# Patient Record
Sex: MALE | Race: WHITE | NOT HISPANIC OR LATINO | Employment: OTHER | URBAN - METROPOLITAN AREA
[De-identification: names, ages, dates, MRNs, and addresses within clinical notes are randomized per-mention and may not be internally consistent; named-entity substitution may affect disease eponyms.]

---

## 2019-05-14 PROBLEM — I49.5 SICK SINUS SYNDROME (HCC): Status: ACTIVE | Noted: 2019-05-14

## 2019-05-14 PROBLEM — I10 BENIGN ESSENTIAL HTN: Status: ACTIVE | Noted: 2019-05-14

## 2019-05-14 RX ORDER — SULINDAC 150 MG/1
150 TABLET ORAL DAILY
COMMUNITY
End: 2019-05-16 | Stop reason: DRUGHIGH

## 2019-05-14 RX ORDER — ASPIRIN 325 MG
325 TABLET ORAL DAILY
COMMUNITY
End: 2020-11-12 | Stop reason: DRUGHIGH

## 2019-05-14 RX ORDER — AMLODIPINE BESYLATE 10 MG/1
10 TABLET ORAL DAILY
COMMUNITY
End: 2019-05-16 | Stop reason: DRUGHIGH

## 2019-05-16 ENCOUNTER — OFFICE VISIT (OUTPATIENT)
Dept: CARDIOLOGY | Facility: CLINIC | Age: 79
End: 2019-05-16

## 2019-05-16 ENCOUNTER — HOSPITAL ENCOUNTER (OUTPATIENT)
Dept: CARDIOLOGY | Facility: HOSPITAL | Age: 79
Discharge: HOME | End: 2019-05-16
Admitting: INTERNAL MEDICINE

## 2019-05-16 VITALS
HEART RATE: 61 BPM | SYSTOLIC BLOOD PRESSURE: 148 MMHG | HEIGHT: 71 IN | DIASTOLIC BLOOD PRESSURE: 76 MMHG | WEIGHT: 227 LBS | BODY MASS INDEX: 31.78 KG/M2

## 2019-05-16 VITALS — WEIGHT: 227 LBS | DIASTOLIC BLOOD PRESSURE: 82 MMHG | HEART RATE: 61 BPM | SYSTOLIC BLOOD PRESSURE: 139 MMHG

## 2019-05-16 DIAGNOSIS — I48.0 PAROXYSMAL ATRIAL FIBRILLATION (HCC): Primary | ICD-10-CM

## 2019-05-16 PROCEDURE — 99214 OFFICE O/P EST MOD 30 MIN: CPT | Performed by: INTERNAL MEDICINE

## 2019-05-16 PROCEDURE — 93280 PM DEVICE PROGR EVAL DUAL: CPT | Performed by: INTERNAL MEDICINE

## 2019-05-16 PROCEDURE — 93306 TTE W/DOPPLER COMPLETE: CPT

## 2019-05-16 PROCEDURE — 93306 TTE W/DOPPLER COMPLETE: CPT | Performed by: INTERNAL MEDICINE

## 2019-05-16 RX ORDER — AMLODIPINE BESYLATE AND BENAZEPRIL HYDROCHLORIDE 10; 20 MG/1; MG/1
10-20 CAPSULE ORAL DAILY
COMMUNITY
End: 2020-11-12 | Stop reason: DRUGHIGH

## 2019-05-16 RX ORDER — MELOXICAM 15 MG/1
15 TABLET ORAL DAILY
COMMUNITY

## 2019-05-16 NOTE — PROGRESS NOTES
Subjective:     Encounter Date:05/16/2019      Patient ID: Ronni Mauricio is a 78 y.o. male.    Chief Complaint:  Chief Complaint   Patient presents with   • Atrial Fibrillation   • Shortness of Breath   • Dizziness   • Pacemaker Check         HPI:  Mr Mauricio is an 77 yo with a past medical history significant for HTN and AV block who had a pacemaker implanted about 10 years ago. He presents for routine followup.  He was found to have episodes of paroxysmal atrial fibrillation on remote monitoring.  Today he denies any symptoms of angina, palpitations, PND or orthopnea.  He does not some mild exertional dyspnea when climbing stairs and occassional dizziness which he attributes to inner ear disorder.    The following portions of the patient's history were reviewed and updated as appropriate: allergies, current medications, past family history, past medical history, past social history, past surgical history and problem list.    Problem List:  Patient Active Problem List   Diagnosis   • Benign essential HTN   • Sick sinus syndrome (CMS/HCC)       Past Medical History:  Past Medical History:   Diagnosis Date   • Atrial fibrillation (CMS/HCC)    • Benign essential HTN    • Pacemaker    • Sick sinus syndrome (CMS/HCC)        Past Surgical History:  Past Surgical History:   Procedure Laterality Date   • INSERT / REPLACE / REMOVE PACEMAKER      [facility]       Social History:  Social History     Socioeconomic History   • Marital status:      Spouse name: Not on file   • Number of children: Not on file   • Years of education: Not on file   • Highest education level: Not on file   Tobacco Use   • Smoking status: Never Smoker   • Smokeless tobacco: Former User     Types: Chew   Substance and Sexual Activity   • Alcohol use: Yes     Comment: occasional   • Drug use: No       Allergies:  Allergies   Allergen Reactions   • Codeine Itching   • Morphine Itching         ROS:  Review of Systems   Constitution: Positive for  weakness. Negative for decreased appetite.   HENT: Negative for congestion.    Cardiovascular: Positive for dyspnea on exertion. Negative for chest pain, claudication, cyanosis, irregular heartbeat, leg swelling, near-syncope, orthopnea, palpitations, paroxysmal nocturnal dyspnea and syncope.   Respiratory: Positive for shortness of breath.    Endocrine: Negative for heat intolerance.   Hematologic/Lymphatic: Bruises/bleeds easily.   Musculoskeletal: Positive for arthritis.   Gastrointestinal: Negative for abdominal pain.   Neurological: Positive for dizziness. Negative for disturbances in coordination.   Psychiatric/Behavioral: Negative for altered mental status.          Objective:         /82   Pulse 61   Wt 103 kg (227 lb)     Physical Exam   Constitutional: He is oriented to person, place, and time. He appears well-developed and well-nourished. No distress.   HENT:   Head: Normocephalic and atraumatic.   Eyes: Pupils are equal, round, and reactive to light.   Neck: Neck supple. No thyromegaly present.   Cardiovascular: Normal rate and regular rhythm. Exam reveals no gallop.   Murmur (2/6 FAWN LSB) heard.  Pulmonary/Chest: Effort normal. He has no wheezes. He has rales.   Abdominal: There is no tenderness.   Neurological: He is alert and oriented to person, place, and time.   Skin: Skin is warm and dry.   Psychiatric: He has a normal mood and affect.       In-Office Procedure(s):  Procedures    ASCVD RIsk Score::  The ASCVD Risk score ([place] FABRICE Jr., et al., 2013) failed to calculate for the following reasons:    The 2013 ASCVD risk score is only valid for ages 40 to 79    Recent Radiology:  Imaging Results (most recent)     None          Lab Review:   No results found for any previous visit.              Invalid input(s): ALKPO4                        Invalid input(s): LDLCALC                Assessment:    1. Paroxysmal Afib - recent onset, asymptomatic.  Discussed indication, risks and benefits of  anticoagulation with CHADS of 2 (HTN, age).  Will review insurance coverage.  2. HTN - controlled  3. Pacemaker followup - device interrogation reviewed by me,  normal device function        Plan:       Echo  Start anticoagulation  Enroll in remote monitoring  RTC 6 months  There are no diagnoses linked to this encounter.    Level of Care:                 Kory Corrales MD  05/16/19  .

## 2019-05-17 RX ORDER — DABIGATRAN ETEXILATE 150 MG/1
150 CAPSULE ORAL 2 TIMES DAILY
Qty: 180 CAPSULE | Refills: 4 | Status: SHIPPED | OUTPATIENT
Start: 2019-05-17 | End: 2019-11-21

## 2019-05-21 DIAGNOSIS — I48.0 AF (PAROXYSMAL ATRIAL FIBRILLATION) (HCC): Primary | ICD-10-CM

## 2019-05-23 ENCOUNTER — TELEPHONE (OUTPATIENT)
Dept: CARDIOLOGY | Facility: CLINIC | Age: 79
End: 2019-05-23

## 2019-05-23 LAB
AORTIC DIMENSIONLESS INDEX: 0.6 (DI)
BH CV ECHO MEAS - ACS: 1.6 CM
BH CV ECHO MEAS - AO MAX PG: 15 MMHG
BH CV ECHO MEAS - AO MEAN PG (FULL): 6 MMHG
BH CV ECHO MEAS - AO MEAN PG: 8 MMHG
BH CV ECHO MEAS - AO ROOT AREA (BSA CORRECTED): 1.7
BH CV ECHO MEAS - AO ROOT AREA: 10.8 CM^2
BH CV ECHO MEAS - AO ROOT DIAM: 3.7 CM
BH CV ECHO MEAS - AO V2 MAX: 193 CM/SEC
BH CV ECHO MEAS - AO V2 MEAN: 127 CM/SEC
BH CV ECHO MEAS - AO V2 VTI: 37.3 CM
BH CV ECHO MEAS - ASC AORTA: 3.3 CM
BH CV ECHO MEAS - AVA(I,A): 2.7 CM^2
BH CV ECHO MEAS - AVA(I,D): 2.7 CM^2
BH CV ECHO MEAS - BSA(HAYCOCK): 2.3 M^2
BH CV ECHO MEAS - BSA: 2.2 M^2
BH CV ECHO MEAS - BZI_BMI: 31.7 KILOGRAMS/M^2
BH CV ECHO MEAS - BZI_METRIC_HEIGHT: 180.3 CM
BH CV ECHO MEAS - BZI_METRIC_WEIGHT: 103 KG
BH CV ECHO MEAS - EDV(CUBED): 97.3 ML
BH CV ECHO MEAS - EDV(MOD-SP2): 94 ML
BH CV ECHO MEAS - EDV(MOD-SP4): 60 ML
BH CV ECHO MEAS - EDV(TEICH): 97.3 ML
BH CV ECHO MEAS - EF(CUBED): 56 %
BH CV ECHO MEAS - EF(MOD-BP): 47 %
BH CV ECHO MEAS - EF(MOD-SP2): 50 %
BH CV ECHO MEAS - EF(MOD-SP4): 45 %
BH CV ECHO MEAS - EF(TEICH): 47.7 %
BH CV ECHO MEAS - ESV(CUBED): 42.9 ML
BH CV ECHO MEAS - ESV(MOD-SP2): 47 ML
BH CV ECHO MEAS - ESV(MOD-SP4): 33 ML
BH CV ECHO MEAS - ESV(TEICH): 50.9 ML
BH CV ECHO MEAS - FS: 23.9 %
BH CV ECHO MEAS - IVS/LVPW: 1
BH CV ECHO MEAS - IVSD: 1.1 CM
BH CV ECHO MEAS - LAT PEAK E' VEL: 6 CM/SEC
BH CV ECHO MEAS - LV DIASTOLIC VOL/BSA (35-75): 27 ML/M^2
BH CV ECHO MEAS - LV MASS(C)D: 181.2 GRAMS
BH CV ECHO MEAS - LV MASS(C)DI: 81.4 GRAMS/M^2
BH CV ECHO MEAS - LV MEAN PG: 2 MMHG
BH CV ECHO MEAS - LV SYSTOLIC VOL/BSA (12-30): 14.8 ML/M^2
BH CV ECHO MEAS - LV V1 MAX: 98 CM/SEC
BH CV ECHO MEAS - LV V1 MEAN: 71.1 CM/SEC
BH CV ECHO MEAS - LV V1 VTI: 22.1 CM
BH CV ECHO MEAS - LVIDD: 4.6 CM
BH CV ECHO MEAS - LVIDS: 3.5 CM
BH CV ECHO MEAS - LVLD AP2: 7.4 CM
BH CV ECHO MEAS - LVLD AP4: 7.5 CM
BH CV ECHO MEAS - LVLS AP2: 6.5 CM
BH CV ECHO MEAS - LVLS AP4: 6.3 CM
BH CV ECHO MEAS - LVOT AREA (M): 4.5 CM^2
BH CV ECHO MEAS - LVOT AREA: 4.5 CM^2
BH CV ECHO MEAS - LVOT DIAM: 2.4 CM
BH CV ECHO MEAS - LVPWD: 1.1 CM
BH CV ECHO MEAS - MED PEAK E' VEL: 5 CM/SEC
BH CV ECHO MEAS - MV A DUR: 0.15 SEC
BH CV ECHO MEAS - MV A MAX VEL: 109 CM/SEC
BH CV ECHO MEAS - MV DEC SLOPE: 298 CM/SEC^2
BH CV ECHO MEAS - MV DEC TIME: 275 SEC
BH CV ECHO MEAS - MV E MAX VEL: 61.2 CM/SEC
BH CV ECHO MEAS - MV E/A: 0.56
BH CV ECHO MEAS - MV MEAN PG: 2 MMHG
BH CV ECHO MEAS - MV P1/2T MAX VEL: 89.6 CM/SEC
BH CV ECHO MEAS - MV P1/2T: 88.1 MSEC
BH CV ECHO MEAS - MV V2 MEAN: 64.9 CM/SEC
BH CV ECHO MEAS - MV V2 VTI: 36.2 CM
BH CV ECHO MEAS - MVA P1/2T LCG: 2.5 CM^2
BH CV ECHO MEAS - MVA(P1/2T): 2.5 CM^2
BH CV ECHO MEAS - MVA(VTI): 2.8 CM^2
BH CV ECHO MEAS - PA ACC SLOPE: 12 CM/SEC^2
BH CV ECHO MEAS - PA ACC TIME: 0.11 SEC
BH CV ECHO MEAS - PA MAX PG: 4.8 MMHG
BH CV ECHO MEAS - PA PR(ACCEL): 28.2 MMHG
BH CV ECHO MEAS - PA V2 MAX: 110 CM/SEC
BH CV ECHO MEAS - PULM A REVS DUR: 0.12 SEC
BH CV ECHO MEAS - PULM A REVS VEL: 32.8 CM/SEC
BH CV ECHO MEAS - PULM DIAS VEL: 37 CM/SEC
BH CV ECHO MEAS - PULM S/D: 1.5
BH CV ECHO MEAS - PULM SYS VEL: 55.1 CM/SEC
BH CV ECHO MEAS - QP/QS: 0.76
BH CV ECHO MEAS - RAP SYSTOLE: 8 MMHG
BH CV ECHO MEAS - RV MEAN PG: 1 MMHG
BH CV ECHO MEAS - RV V1 MEAN: 52.1 CM/SEC
BH CV ECHO MEAS - RV V1 VTI: 14.4 CM
BH CV ECHO MEAS - RVOT AREA: 5.3 CM^2
BH CV ECHO MEAS - RVOT DIAM: 2.6 CM
BH CV ECHO MEAS - RVSP: 33 MMHG
BH CV ECHO MEAS - SI(AO): 180.2 ML/M^2
BH CV ECHO MEAS - SI(CUBED): 24.5 ML/M^2
BH CV ECHO MEAS - SI(LVOT): 44.9 ML/M^2
BH CV ECHO MEAS - SI(MOD-SP2): 21.1 ML/M^2
BH CV ECHO MEAS - SI(MOD-SP4): 12.1 ML/M^2
BH CV ECHO MEAS - SI(TEICH): 20.9 ML/M^2
BH CV ECHO MEAS - SV(AO): 401.1 ML
BH CV ECHO MEAS - SV(CUBED): 54.5 ML
BH CV ECHO MEAS - SV(LVOT): 100 ML
BH CV ECHO MEAS - SV(MOD-SP2): 47 ML
BH CV ECHO MEAS - SV(MOD-SP4): 27 ML
BH CV ECHO MEAS - SV(RVOT): 76.5 ML
BH CV ECHO MEAS - SV(TEICH): 46.5 ML
BH CV ECHO MEAS - TAPSE (>1.6): 3.1 CM2
BH CV ECHO MEAS - TR MAX VEL: 248 CM/SEC
BH CV ECHO MEASUREMENTS AVERAGE E/E' RATIO: 11.13
BH CV VAS BP LEFT ARM: 1 MMHG
BH CV XLRA - RV BASE: 5.6 CM
BH CV XLRA - TDI S': 15.1 CM/SEC
LEFT ATRIUM VOLUME INDEX: 34.1 ML/M2
MAXIMAL PREDICTED HEART RATE: 140 BPM
STRESS TARGET HR: 119 BPM

## 2019-05-28 ENCOUNTER — TELEPHONE (OUTPATIENT)
Dept: CARDIOLOGY | Facility: CLINIC | Age: 79
End: 2019-05-28

## 2019-05-28 NOTE — TELEPHONE ENCOUNTER
PT just received Xarelto thru his mail order and he also takes an asprin. He needs to know if he needs to take the Xarelto.

## 2019-05-29 NOTE — TELEPHONE ENCOUNTER
Called patient advised that it doesn't say to stop aspirin In note that I would double check with him also that I would get results of echo and call patient back about results made his 6 mo follow up as well.

## 2019-11-14 ENCOUNTER — OFFICE VISIT (OUTPATIENT)
Dept: CARDIOLOGY | Facility: CLINIC | Age: 79
End: 2019-11-14

## 2019-11-14 VITALS
HEART RATE: 68 BPM | BODY MASS INDEX: 31.39 KG/M2 | DIASTOLIC BLOOD PRESSURE: 78 MMHG | HEIGHT: 71 IN | WEIGHT: 224.2 LBS | SYSTOLIC BLOOD PRESSURE: 123 MMHG

## 2019-11-14 DIAGNOSIS — I10 BENIGN ESSENTIAL HTN: ICD-10-CM

## 2019-11-14 DIAGNOSIS — I49.5 SICK SINUS SYNDROME (HCC): Primary | ICD-10-CM

## 2019-11-14 DIAGNOSIS — Z95.0 PRESENCE OF CARDIAC PACEMAKER: ICD-10-CM

## 2019-11-14 DIAGNOSIS — R01.1 HEART MURMUR: ICD-10-CM

## 2019-11-14 PROCEDURE — 93288 INTERROG EVL PM/LDLS PM IP: CPT | Performed by: INTERNAL MEDICINE

## 2019-11-14 PROCEDURE — 99213 OFFICE O/P EST LOW 20 MIN: CPT | Performed by: INTERNAL MEDICINE

## 2019-11-14 NOTE — PROGRESS NOTES
Subjective:     Encounter Date:11/14/2019      Patient ID: Ronni Mauricio is a 79 y.o. male.    Chief Complaint:  Presents for followup of pacemaker and AF    HPI:  Mr Mauricio is an 80 yo with a past medical history significant for HTN and AV block who had a pacemaker implanted in 2009.  He also was noted to have some paroxysmal atrial fibrillation on remote monitoring.    He had an echo 5/18 which showed an EF of 47%, moderately calcified AoV with mild AS, mild-mod MR    Today, he states that he has been doing well without complaints of palpitations, chest pain, dyspnea, PND or syncope.  He remains fairly active without limitations.      The following portions of the patient's history were reviewed and updated as appropriate: allergies, current medications, past family history, past medical history, past social history, past surgical history and problem list.    Problem List:  Patient Active Problem List   Diagnosis   • Benign essential HTN   • Sick sinus syndrome (CMS/HCC)   • Presence of cardiac pacemaker       Past Medical History:  Past Medical History:   Diagnosis Date   • Atrial fibrillation (CMS/HCC)    • Benign essential HTN    • Pacemaker    • Sick sinus syndrome (CMS/HCC)        Past Surgical History:  Past Surgical History:   Procedure Laterality Date   • INSERT / REPLACE / REMOVE PACEMAKER      [facility]       Social History:  Social History     Socioeconomic History   • Marital status:      Spouse name: Not on file   • Number of children: Not on file   • Years of education: Not on file   • Highest education level: Not on file   Tobacco Use   • Smoking status: Never Smoker   • Smokeless tobacco: Former User     Types: Chew   Substance and Sexual Activity   • Alcohol use: Yes     Comment: occasional   • Drug use: No       Allergies:  Allergies   Allergen Reactions   • Codeine Itching   • Morphine Itching       Immunizations:    There is no immunization history on file for this  patient.    ROS:  Review of Systems   Constitution: Negative for weakness and malaise/fatigue.   HENT: Negative.    Eyes: Negative.    Cardiovascular: Negative for chest pain, dyspnea on exertion, irregular heartbeat, palpitations and syncope.   Respiratory: Negative for shortness of breath.    Endocrine: Negative.    Hematologic/Lymphatic: Negative.    Skin: Negative.    Musculoskeletal: Negative.    Gastrointestinal: Negative.    Genitourinary: Negative.    Neurological: Negative.    Psychiatric/Behavioral: Negative.    Allergic/Immunologic: Negative.           Objective:         There were no vitals taken for this visit.    Physical Exam   Constitutional: He is oriented to person, place, and time. He appears well-developed and well-nourished.   HENT:   Head: Normocephalic and atraumatic.   Eyes: Conjunctivae are normal. Pupils are equal, round, and reactive to light.   Neck: Normal range of motion. Neck supple. No JVD present.   Cardiovascular: Normal rate and regular rhythm.   Murmur (2/6 FAWN) heard.  Pulmonary/Chest: Effort normal and breath sounds normal.   Abdominal: Soft. Bowel sounds are normal.   Musculoskeletal: Normal range of motion.   Neurological: He is alert and oriented to person, place, and time.   Skin: Skin is warm and dry.   Psychiatric: He has a normal mood and affect.       In-Office Procedure(s):  Procedures Pacemaker eval interpreted by [id number]  Battery 10 months to corrina    P wave 2.7mv  Threshold 0.5V  Impedance 360 ohms    R wave paced  Threshold 1.0V  Impedance 610 ohms    Events -  99% v paced    ASCVD RIsk Score::  The ASCVD Risk score ([place] FABRICE Jr., et al., 2013) failed to calculate for the following reasons:    The 2013 ASCVD risk score is only valid for ages 40 to 79    Recent Radiology:  Imaging Results (Most Recent)     None          Lab Review:   Office Visit on 05/16/2019   Component Date Value   • BSA 05/16/2019 2.2    • IVSd 05/16/2019 1.1    • LVIDd 05/16/2019 4.6    •  LVIDs 05/16/2019 3.5    • LVPWd 05/16/2019 1.1    • IVS/LVPW 05/16/2019 1.0    • FS 05/16/2019 23.9    • EDV(Teich) 05/16/2019 97.3    • ESV(Teich) 05/16/2019 50.9    • EF(Teich) 05/16/2019 47.7    • EDV(cubed) 05/16/2019 97.3    • ESV(cubed) 05/16/2019 42.9    • EF(cubed) 05/16/2019 56.0    • LV mass(C)d 05/16/2019 181.2    • LV mass(C)dI 05/16/2019 81.4    • SV(Teich) 05/16/2019 46.5    • SI(Teich) 05/16/2019 20.9    • SV(cubed) 05/16/2019 54.5    • SI(cubed) 05/16/2019 24.5    • Ao root diam 05/16/2019 3.7    • Ao root area 05/16/2019 10.8    • ACS 05/16/2019 1.6    • asc Aorta Diam 05/16/2019 3.3    • LVOT diam 05/16/2019 2.4    • LVOT area 05/16/2019 4.5    • LVOT area(traced) 05/16/2019 4.5    • RVOT diam 05/16/2019 2.6    • RVOT area 05/16/2019 5.3    • LVLd ap4 05/16/2019 7.5    • EDV(MOD-sp4) 05/16/2019 60.0    • LVLs ap4 05/16/2019 6.3    • ESV(MOD-sp4) 05/16/2019 33.0    • EF(MOD-sp4) 05/16/2019 45.0    • LVLd ap2 05/16/2019 7.4    • EDV(MOD-sp2) 05/16/2019 94.0    • LVLs ap2 05/16/2019 6.5    • ESV(MOD-sp2) 05/16/2019 47.0    • EF(MOD-sp2) 05/16/2019 50.0    • SV(MOD-sp4) 05/16/2019 27.0    • SI(MOD-sp4) 05/16/2019 12.1    • SV(MOD-sp2) 05/16/2019 47.0    • SI(MOD-sp2) 05/16/2019 21.1    • Ao root area (BSA correc* 05/16/2019 1.7    • LV Dupree Vol (BSA correct* 05/16/2019 27.0    • LV Sys Vol (BSA correcte* 05/16/2019 14.8    • MV A dur 05/16/2019 0.15    • MV E max richmond 05/16/2019 61.2    • MV A max richmond 05/16/2019 109.0    • MV E/A 05/16/2019 0.56    • MV V2 mean 05/16/2019 64.9    • MV mean PG 05/16/2019 2.0    • MV V2 VTI 05/16/2019 36.2    • MVA(VTI) 05/16/2019 2.8    • MV P1/2t max richmond 05/16/2019 89.6    • MV P1/2t 05/16/2019 88.1    • MVA(P1/2t) 05/16/2019 2.5    • MV dec slope 05/16/2019 298.0    • MV dec time 05/16/2019 275    • Ao V2 mean 05/16/2019 127.0    • Ao mean PG 05/16/2019 8.0    • Ao mean PG (full) 05/16/2019 6.0    • Ao V2 VTI 05/16/2019 37.3    • JÚNIOR(I,A) 05/16/2019 2.7    • JÚNIOR(I,D)  05/16/2019 2.7    • LV V1 mean PG 05/16/2019 2.0    • LV V1 mean 05/16/2019 71.1    • LV V1 VTI 05/16/2019 22.1    • SV(Ao) 05/16/2019 401.1    • SI(Ao) 05/16/2019 180.2    • SV(LVOT) 05/16/2019 100.0    • SV(RVOT) 05/16/2019 76.5    • SI(LVOT) 05/16/2019 44.9    • PA V2 max 05/16/2019 110.0    • PA max PG 05/16/2019 4.8    • PA acc slope 05/16/2019 12.0    • PA acc time 05/16/2019 0.11    • RV V1 mean PG 05/16/2019 1.0    • RV V1 mean 05/16/2019 52.1    • RV V1 VTI 05/16/2019 14.4    • TR max jude 05/16/2019 248.0    • PA pr(Accel) 05/16/2019 28.2    • Pulm Sys Jude 05/16/2019 55.1    • Pulm Dupree Jude 05/16/2019 37.0    • Pulm S/D 05/16/2019 1.5    • Qp/Qs 05/16/2019 0.76    • Pulm A Revs Dur 05/16/2019 0.12    • Pulm A Revs Jude 05/16/2019 32.8    • MVA P1/2T LCG 05/16/2019 2.5    • BH CV ECHO EBONY - BZI_BMI 05/16/2019 31.7    • BH CV ECHO EBONY - BSA(HA* 05/16/2019 2.3    • BH CV ECHO EBONY - BZI_ME* 05/16/2019 103.0    • BH CV ECHO EBONY - BZI_ME* 05/16/2019 180.3    • Target HR (85%) 05/16/2019 119    • Max. Pred. HR (100%) 05/16/2019 140    • BH CV VAS BP LEFT ARM 05/16/2019 1    • TDI S' 05/16/2019 15.10    • RV Base 05/16/2019 5.60    • Dimensionless Index 05/16/2019 0.6    • LA Volume Index 05/16/2019 34.1    • Avg E/e' ratio 05/16/2019 11.13    • Ao pk jude 05/16/2019 193.0    • EF(MOD-bp) 05/16/2019 47.0    • Lat Peak E' Jude 05/16/2019 6.0    • LV V1 max 05/16/2019 98.0    • Med Peak E' Jude 05/16/2019 5.00    • RAP systole 05/16/2019 8    • RVSP(TR) 05/16/2019 33    • Ao max PG 05/16/2019 15.0    • TAPSE (>1.6) 05/16/2019 3.10                 Assessment:          Diagnosis Plan   1. Sick sinus syndrome (CMS/HCC)     2. Presence of cardiac pacemaker     3. Benign essential HTN            Plan:     1. AV block - s/p pacemaker  2. Pacemaker followup - normal device function, approaching corrina  3. HTN - controlled  4. Valvular Heart disease - mild AS, mild-moderate MR, asymptomatic    RTC 6 months         Level of  Care:                 Kory Corrales MD  11/14/19  .

## 2019-11-21 ENCOUNTER — TELEPHONE (OUTPATIENT)
Dept: CARDIOLOGY | Facility: CLINIC | Age: 79
End: 2019-11-21

## 2019-11-21 NOTE — TELEPHONE ENCOUNTER
Pt called with questions which were answered for pt.  He reviewed his medication list & found Pradaxa listed which he is not currently taking. Xarelto is an active medication.  Med list updated. RTRN

## 2019-11-26 ENCOUNTER — HOSPITAL ENCOUNTER (OUTPATIENT)
Dept: CARDIOLOGY | Facility: HOSPITAL | Age: 79
Discharge: HOME | End: 2019-11-26
Admitting: INTERNAL MEDICINE

## 2019-11-26 VITALS
SYSTOLIC BLOOD PRESSURE: 151 MMHG | HEIGHT: 71 IN | BODY MASS INDEX: 31.36 KG/M2 | DIASTOLIC BLOOD PRESSURE: 67 MMHG | WEIGHT: 224 LBS

## 2019-11-26 DIAGNOSIS — I10 BENIGN ESSENTIAL HTN: ICD-10-CM

## 2019-11-26 DIAGNOSIS — R01.1 HEART MURMUR: ICD-10-CM

## 2019-11-26 PROCEDURE — 93306 TTE W/DOPPLER COMPLETE: CPT

## 2019-11-26 PROCEDURE — 93306 TTE W/DOPPLER COMPLETE: CPT | Performed by: INTERNAL MEDICINE

## 2019-11-27 LAB
AORTIC DIMENSIONLESS INDEX: 0.4 (DI)
BH CV ECHO MEAS - ACS: 1.6 CM
BH CV ECHO MEAS - AO MAX PG: 19 MMHG
BH CV ECHO MEAS - AO MEAN PG (FULL): 10 MMHG
BH CV ECHO MEAS - AO MEAN PG: 12 MMHG
BH CV ECHO MEAS - AO ROOT AREA (BSA CORRECTED): 1.3
BH CV ECHO MEAS - AO ROOT AREA: 6.2 CM^2
BH CV ECHO MEAS - AO ROOT DIAM: 2.8 CM
BH CV ECHO MEAS - AO V2 MAX: 219 CM/SEC
BH CV ECHO MEAS - AO V2 MEAN: 162 CM/SEC
BH CV ECHO MEAS - AO V2 VTI: 52 CM
BH CV ECHO MEAS - AVA(I,A): 1.4 CM^2
BH CV ECHO MEAS - AVA(I,D): 1.4 CM^2
BH CV ECHO MEAS - BSA(HAYCOCK): 2.3 M^2
BH CV ECHO MEAS - BSA: 2.2 M^2
BH CV ECHO MEAS - BZI_BMI: 31.2 KILOGRAMS/M^2
BH CV ECHO MEAS - BZI_METRIC_HEIGHT: 180.3 CM
BH CV ECHO MEAS - BZI_METRIC_WEIGHT: 101.6 KG
BH CV ECHO MEAS - EDV(CUBED): 85.2 ML
BH CV ECHO MEAS - EDV(MOD-SP2): 120 ML
BH CV ECHO MEAS - EDV(MOD-SP4): 120 ML
BH CV ECHO MEAS - EDV(TEICH): 87.7 ML
BH CV ECHO MEAS - EF(CUBED): 65 %
BH CV ECHO MEAS - EF(MOD-SP2): 50 %
BH CV ECHO MEAS - EF(MOD-SP4): 52.5 %
BH CV ECHO MEAS - EF(TEICH): 56.8 %
BH CV ECHO MEAS - ESV(CUBED): 29.8 ML
BH CV ECHO MEAS - ESV(MOD-SP2): 60 ML
BH CV ECHO MEAS - ESV(MOD-SP4): 57 ML
BH CV ECHO MEAS - ESV(TEICH): 37.9 ML
BH CV ECHO MEAS - FS: 29.5 %
BH CV ECHO MEAS - IVS/LVPW: 0.83
BH CV ECHO MEAS - IVSD: 1 CM
BH CV ECHO MEAS - LAT PEAK E' VEL: 6 CM/SEC
BH CV ECHO MEAS - LV DIASTOLIC VOL/BSA (35-75): 54.2 ML/M^2
BH CV ECHO MEAS - LV MASS(C)D: 168.9 GRAMS
BH CV ECHO MEAS - LV MASS(C)DI: 76.3 GRAMS/M^2
BH CV ECHO MEAS - LV MAX PG: 4 MMHG
BH CV ECHO MEAS - LV MEAN PG: 2 MMHG
BH CV ECHO MEAS - LV SYSTOLIC VOL/BSA (12-30): 25.8 ML/M^2
BH CV ECHO MEAS - LV V1 MAX: 96 CM/SEC
BH CV ECHO MEAS - LV V1 MEAN: 68.1 CM/SEC
BH CV ECHO MEAS - LV V1 VTI: 21.3 CM
BH CV ECHO MEAS - LVIDD: 4.4 CM
BH CV ECHO MEAS - LVIDS: 3.1 CM
BH CV ECHO MEAS - LVLD AP2: 8.9 CM
BH CV ECHO MEAS - LVLD AP4: 8.6 CM
BH CV ECHO MEAS - LVLS AP2: 7 CM
BH CV ECHO MEAS - LVLS AP4: 6.8 CM
BH CV ECHO MEAS - LVOT AREA (M): 3.5 CM^2
BH CV ECHO MEAS - LVOT AREA: 3.5 CM^2
BH CV ECHO MEAS - LVOT DIAM: 2.1 CM
BH CV ECHO MEAS - LVPWD: 1.2 CM
BH CV ECHO MEAS - MED PEAK E' VEL: 7 CM/SEC
BH CV ECHO MEAS - MV A DUR: 0.15 SEC
BH CV ECHO MEAS - MV A MAX VEL: 79 CM/SEC
BH CV ECHO MEAS - MV DEC SLOPE: 236 CM/SEC^2
BH CV ECHO MEAS - MV DEC TIME: 430 SEC
BH CV ECHO MEAS - MV E MAX VEL: 41.5 CM/SEC
BH CV ECHO MEAS - MV E/A: 0.53
BH CV ECHO MEAS - MV MEAN PG: 1 MMHG
BH CV ECHO MEAS - MV P1/2T MAX VEL: 64.5 CM/SEC
BH CV ECHO MEAS - MV P1/2T: 80 MSEC
BH CV ECHO MEAS - MV V2 MEAN: 46.1 CM/SEC
BH CV ECHO MEAS - MV V2 VTI: 23.3 CM
BH CV ECHO MEAS - MVA P1/2T LCG: 3.4 CM^2
BH CV ECHO MEAS - MVA(P1/2T): 2.7 CM^2
BH CV ECHO MEAS - MVA(VTI): 3.2 CM^2
BH CV ECHO MEAS - PA ACC SLOPE: 1894 CM/SEC^2
BH CV ECHO MEAS - PA ACC TIME: 0.07 SEC
BH CV ECHO MEAS - PA MAX PG (FULL): 6 MMHG
BH CV ECHO MEAS - PA MAX PG: 7.7 MMHG
BH CV ECHO MEAS - PA PR(ACCEL): 45.7 MMHG
BH CV ECHO MEAS - PA V2 MAX: 139 CM/SEC
BH CV ECHO MEAS - PULM A REVS DUR: 0.14 SEC
BH CV ECHO MEAS - PULM A REVS VEL: 39 CM/SEC
BH CV ECHO MEAS - PULM DIAS VEL: 26 CM/SEC
BH CV ECHO MEAS - PULM S/D: 1.6
BH CV ECHO MEAS - PULM SYS VEL: 40.3 CM/SEC
BH CV ECHO MEAS - PVA(V,A): 2 CM^2
BH CV ECHO MEAS - PVA(V,D): 2 CM^2
BH CV ECHO MEAS - QP/QS: 0.72
BH CV ECHO MEAS - RAP SYSTOLE: 3 MMHG
BH CV ECHO MEAS - RV MAX PG: 1.7 MMHG
BH CV ECHO MEAS - RV MEAN PG: 1 MMHG
BH CV ECHO MEAS - RV V1 MAX: 65.4 CM/SEC
BH CV ECHO MEAS - RV V1 MEAN: 39 CM/SEC
BH CV ECHO MEAS - RV V1 VTI: 12.8 CM
BH CV ECHO MEAS - RVOT AREA: 4.2 CM^2
BH CV ECHO MEAS - RVOT DIAM: 2.3 CM
BH CV ECHO MEAS - SI(AO): 144.7 ML/M^2
BH CV ECHO MEAS - SI(CUBED): 25 ML/M^2
BH CV ECHO MEAS - SI(LVOT): 33.3 ML/M^2
BH CV ECHO MEAS - SI(MOD-SP2): 27.1 ML/M^2
BH CV ECHO MEAS - SI(MOD-SP4): 28.5 ML/M^2
BH CV ECHO MEAS - SI(TEICH): 22.5 ML/M^2
BH CV ECHO MEAS - SV(AO): 320.2 ML
BH CV ECHO MEAS - SV(CUBED): 55.4 ML
BH CV ECHO MEAS - SV(LVOT): 73.8 ML
BH CV ECHO MEAS - SV(MOD-SP2): 60 ML
BH CV ECHO MEAS - SV(MOD-SP4): 63 ML
BH CV ECHO MEAS - SV(RVOT): 53.2 ML
BH CV ECHO MEAS - SV(TEICH): 49.8 ML
BH CV ECHO MEAS - TAPSE (>1.6): 1.8 CM2
BH CV ECHO MEAS - TR MAX VEL: 166 CM/SEC
BH CV ECHO MEASUREMENTS AVERAGE E/E' RATIO: 6.38
BH CV VAS BP LEFT ARM: NORMAL MMHG
BH CV XLRA - RV BASE: 3.8 CM
BH CV XLRA - TDI S': 13 CM/SEC
LEFT ATRIUM VOLUME INDEX: 21 ML/M2

## 2019-12-02 ENCOUNTER — TELEPHONE (OUTPATIENT)
Dept: CARDIOLOGY | Facility: CLINIC | Age: 79
End: 2019-12-02

## 2019-12-03 NOTE — TELEPHONE ENCOUNTER
Spoke with patient and let him know his results were in his box and that I would have him call him with results on Thursday  Patient understood

## 2019-12-06 NOTE — TELEPHONE ENCOUNTER
Mr Mauricio is requesting a return call. Dr Corrales has not called with his test results. Just wanting to get those results. Thank you    [phone]

## 2019-12-06 NOTE — TELEPHONE ENCOUNTER
His EF near norm  Aortic valve is calcified   Mod narrowed   Mitral valve leaks a little   Nothing to be done for now   Murmur comes from this follow up with echos

## 2020-01-22 DIAGNOSIS — I48.0 AF (PAROXYSMAL ATRIAL FIBRILLATION) (HCC): ICD-10-CM

## 2020-03-16 ENCOUNTER — CLINICAL SUPPORT (OUTPATIENT)
Dept: CARDIOLOGY | Facility: CLINIC | Age: 80
End: 2020-03-16

## 2020-03-16 DIAGNOSIS — Z95.0 PRESENCE OF CARDIAC PACEMAKER: ICD-10-CM

## 2020-03-16 DIAGNOSIS — I49.5 SICK SINUS SYNDROME (HCC): ICD-10-CM

## 2020-03-16 PROCEDURE — 93294 REM INTERROG EVL PM/LDLS PM: CPT | Performed by: INTERNAL MEDICINE

## 2020-03-16 PROCEDURE — 93296 REM INTERROG EVL PM/IDS: CPT | Performed by: INTERNAL MEDICINE

## 2020-05-18 ENCOUNTER — OFFICE VISIT (OUTPATIENT)
Dept: CARDIOLOGY | Facility: CLINIC | Age: 80
End: 2020-05-18

## 2020-05-18 VITALS
RESPIRATION RATE: 16 BRPM | WEIGHT: 221 LBS | HEIGHT: 71 IN | SYSTOLIC BLOOD PRESSURE: 132 MMHG | DIASTOLIC BLOOD PRESSURE: 60 MMHG | HEART RATE: 64 BPM | BODY MASS INDEX: 30.94 KG/M2

## 2020-05-18 DIAGNOSIS — Z95.0 PRESENCE OF CARDIAC PACEMAKER: ICD-10-CM

## 2020-05-18 DIAGNOSIS — I35.0 AORTIC STENOSIS, MODERATE: ICD-10-CM

## 2020-05-18 DIAGNOSIS — R06.09 DYSPNEA ON EXERTION: Primary | ICD-10-CM

## 2020-05-18 DIAGNOSIS — I10 BENIGN ESSENTIAL HTN: ICD-10-CM

## 2020-05-18 DIAGNOSIS — I49.5 SICK SINUS SYNDROME (HCC): ICD-10-CM

## 2020-05-18 DIAGNOSIS — I51.89 DIASTOLIC DYSFUNCTION: ICD-10-CM

## 2020-05-18 PROCEDURE — 99213 OFFICE O/P EST LOW 20 MIN: CPT | Performed by: INTERNAL MEDICINE

## 2020-05-18 PROCEDURE — 93288 INTERROG EVL PM/LDLS PM IP: CPT | Performed by: INTERNAL MEDICINE

## 2020-05-19 ENCOUNTER — TRANSCRIBE ORDERS (OUTPATIENT)
Dept: SLEEP MEDICINE | Facility: HOSPITAL | Age: 80
End: 2020-05-19

## 2020-05-19 DIAGNOSIS — Z01.818 OTHER SPECIFIED PRE-OPERATIVE EXAMINATION: Primary | ICD-10-CM

## 2020-05-19 PROBLEM — I35.0 AORTIC STENOSIS, MODERATE: Status: ACTIVE | Noted: 2020-05-19

## 2020-05-19 PROBLEM — I51.89 DIASTOLIC DYSFUNCTION: Status: ACTIVE | Noted: 2020-05-19

## 2020-05-19 NOTE — PROGRESS NOTES
Subjective:     Encounter Date:05/18/2020      Patient ID: Ronni Mauricio is a 79 y.o. male.    Chief Complaint:  Followup pacemaker and atrial fibrillation    HPI:  Patient presensts for  followup of his pacemaker and atrial fibrillation.  Mr Mauricio is an 80 yo with a past medical history significant for HTN and AV block who had a pacemaker implanted in 2009.  He also was noted to have some paroxysmal atrial fibrillation on remote monitoring.     He had an echo 11/19 which showed an EF of 45-50%, concentric LVH with grade 1 diastolic dysfunction, normal RV size and function, moderate AS without AR, MAC wtihout MS/MR.     Since he was last seen 6 months ago Mr Mauricio now complains of having exertional dyspnea.  He notices dyspnea with climbing a flight of stairs or carrying objects.  There is no associated chest discomfort or palpitations.  He denies any rest symptoms, PND, orthopnea or peripheral edema.      The following portions of the patient's history were reviewed and updated as appropriate: allergies, current medications, past family history, past medical history, past social history, past surgical history and problem list.    Problem List:  Patient Active Problem List   Diagnosis   • Benign essential HTN   • Sick sinus syndrome (CMS/HCC)   • Presence of cardiac pacemaker   • Heart murmur       Past Medical History:  Past Medical History:   Diagnosis Date   • Atrial fibrillation (CMS/HCC)    • Benign essential HTN    • Pacemaker    • Sick sinus syndrome (CMS/HCC)        Past Surgical History:  Past Surgical History:   Procedure Laterality Date   • INSERT / REPLACE / REMOVE PACEMAKER      [facility]       Social History:  Social History     Socioeconomic History   • Marital status:      Spouse name: Not on file   • Number of children: Not on file   • Years of education: Not on file   • Highest education level: Not on file   Tobacco Use   • Smoking status: Never Smoker   • Smokeless tobacco: Former  "User     Types: Chew   Substance and Sexual Activity   • Alcohol use: Yes     Comment: occasional   • Drug use: No       Allergies:  Allergies   Allergen Reactions   • Codeine Itching   • Morphine Itching       Immunizations:    There is no immunization history on file for this patient.    ROS:  Review of Systems   Constitution: Positive for malaise/fatigue.   Cardiovascular: Positive for dyspnea on exertion. Negative for chest pain, irregular heartbeat, leg swelling, near-syncope, orthopnea, palpitations, paroxysmal nocturnal dyspnea and syncope.   Respiratory: Positive for shortness of breath.    All other systems reviewed and are negative.         Objective:         /60   Pulse 64   Resp 16   Ht 180.3 cm (71\")   Wt 100 kg (221 lb)   BMI 30.82 kg/m²     Physical Exam   Constitutional: He is oriented to person, place, and time. He appears well-developed and well-nourished. No distress.   HENT:   Head: Normocephalic and atraumatic.   Eyes: Pupils are equal, round, and reactive to light. Conjunctivae and EOM are normal. No scleral icterus.   Neck: Normal range of motion. No thyromegaly present.   Cardiovascular: Normal rate and regular rhythm.   Murmur: 1-2/6 FAWN.  Pulmonary/Chest: Effort normal and breath sounds normal.   Abdominal: Soft. Bowel sounds are normal.   Musculoskeletal: Normal range of motion.   Neurological: He is alert and oriented to person, place, and time.   Skin: Skin is warm and dry.   Psychiatric: He has a normal mood and affect.       In-Office Procedure(s):  Procedures Pacemaker eval interpreted by [facility]  Battery DOMINIQUE    P wave 1mv  Threshold 0.5V  Impedance 380 ohms    R wave paced  Threshold 1.0V  Impedance 650 ohms    Events - none    ASCVD RIsk Score::  The ASCVD Risk score ([place] FABRICE Jr., et al., 2013) failed to calculate for the following reasons:    The 2013 ASCVD risk score is only valid for ages 40 to 79    Recent Radiology:  Imaging Results (Most Recent)     None    "       Lab Review:   No visits with results within 2 Month(s) from this visit.   Latest known visit with results is:   Hospital Outpatient Visit on 11/26/2019   Component Date Value   • BSA 11/26/2019 2.2    • IVSd 11/26/2019 1.0    • LVIDd 11/26/2019 4.4    • LVIDs 11/26/2019 3.1    • LVPWd 11/26/2019 1.2    • IVS/LVPW 11/26/2019 0.83    • FS 11/26/2019 29.5    • EDV(Teich) 11/26/2019 87.7    • ESV(Teich) 11/26/2019 37.9    • EF(Teich) 11/26/2019 56.8    • EDV(cubed) 11/26/2019 85.2    • ESV(cubed) 11/26/2019 29.8    • EF(cubed) 11/26/2019 65.0    • LV mass(C)d 11/26/2019 168.9    • LV mass(C)dI 11/26/2019 76.3    • SV(Teich) 11/26/2019 49.8    • SI(Teich) 11/26/2019 22.5    • SV(cubed) 11/26/2019 55.4    • SI(cubed) 11/26/2019 25.0    • Ao root diam 11/26/2019 2.8    • Ao root area 11/26/2019 6.2    • ACS 11/26/2019 1.6    • LVOT diam 11/26/2019 2.1    • LVOT area 11/26/2019 3.5    • LVOT area(traced) 11/26/2019 3.5    • RVOT diam 11/26/2019 2.3    • RVOT area 11/26/2019 4.2    • LVLd ap4 11/26/2019 8.6    • EDV(MOD-sp4) 11/26/2019 120.0    • LVLs ap4 11/26/2019 6.8    • ESV(MOD-sp4) 11/26/2019 57.0    • EF(MOD-sp4) 11/26/2019 52.5    • LVLd ap2 11/26/2019 8.9    • EDV(MOD-sp2) 11/26/2019 120.0    • LVLs ap2 11/26/2019 7.0    • ESV(MOD-sp2) 11/26/2019 60.0    • EF(MOD-sp2) 11/26/2019 50.0    • SV(MOD-sp4) 11/26/2019 63.0    • SI(MOD-sp4) 11/26/2019 28.5    • SV(MOD-sp2) 11/26/2019 60.0    • SI(MOD-sp2) 11/26/2019 27.1    • Ao root area (BSA correc* 11/26/2019 1.3    • LV Dupree Vol (BSA correct* 11/26/2019 54.2    • LV Sys Vol (BSA correcte* 11/26/2019 25.8    • MV A dur 11/26/2019 0.15    • MV E max richmond 11/26/2019 41.5    • MV A max richmond 11/26/2019 79.0    • MV E/A 11/26/2019 0.53    • MV V2 mean 11/26/2019 46.1    • MV mean PG 11/26/2019 1.0    • MV V2 VTI 11/26/2019 23.3    • MVA(VTI) 11/26/2019 3.2    • MV P1/2t max [place] 11/26/2019 64.5    • MV P1/2t 11/26/2019 80.0    • MVA(P1/2t) 11/26/2019 2.7    • MV dec slope  11/26/2019 236.0    • MV dec time 11/26/2019 430    • Ao V2 mean 11/26/2019 162.0    • Ao mean PG 11/26/2019 12.0    • Ao mean PG (full) 11/26/2019 10.0    • Ao V2 VTI 11/26/2019 52.0    • JÚNIOR(I,A) 11/26/2019 1.4    • JÚNIOR(I,D) 11/26/2019 1.4    • LV V1 mean PG 11/26/2019 2.0    • LV V1 mean 11/26/2019 68.1    • LV V1 VTI 11/26/2019 21.3    • SV(Ao) 11/26/2019 320.2    • SI(Ao) 11/26/2019 144.7    • SV(LVOT) 11/26/2019 73.8    • SV(RVOT) 11/26/2019 53.2    • SI(LVOT) 11/26/2019 33.3    • PA V2 max 11/26/2019 139.0    • PA max PG 11/26/2019 7.7    • PA max PG (full) 11/26/2019 6.0    • BH CV ECHO EBONY - PVA(V,* 11/26/2019 2.0    • BH CV ECHO EBONY - PVA(V,* 11/26/2019 2.0    • PA acc slope 11/26/2019 1,894    • PA acc time 11/26/2019 0.07    • RV V1 max PG 11/26/2019 1.7    • RV V1 mean PG 11/26/2019 1.0    • RV V1 max 11/26/2019 65.4    • RV V1 mean 11/26/2019 39.0    • RV V1 VTI 11/26/2019 12.8    • TR max jude 11/26/2019 166.0    • PA pr(Accel) 11/26/2019 45.7    • Pulm Sys Jude 11/26/2019 40.3    • Pulm Dupree Jude 11/26/2019 26.0    • Pulm S/D 11/26/2019 1.6    • Qp/Qs 11/26/2019 0.72    • Pulm A Revs Dur 11/26/2019 0.14    • Pulm A Revs Jude 11/26/2019 39.0    • MVA P1/2T LCG 11/26/2019 3.4    • BH CV ECHO EBONY - BZI_BMI 11/26/2019 31.2    •  CV ECHO EBONY - BSA(HA* 11/26/2019 2.3    •  CV ECHO EBONY - BZI_ME* 11/26/2019 101.6    •  CV ECHO EBONY - BZI_ME* 11/26/2019 180.3    •  CV VAS BP LEFT ARM 11/26/2019 151/67    • TDI S' 11/26/2019 13.00    • RV Base 11/26/2019 3.80    • Dimensionless Index 11/26/2019 0.4    • LA Volume Index 11/26/2019 21.0    • Avg E/e' ratio 11/26/2019 6.38    • Ao pk jude 11/26/2019 219.0    • Lat Peak E' Jude 11/26/2019 6.0    • LV V1 max PG 11/26/2019 4.0    • LV V1 max 11/26/2019 96.0    • Med Peak E' Jude 11/26/2019 7.00    • RAP systole 11/26/2019 3    • Ao max PG 11/26/2019 19.0    • TAPSE (>1.6) 11/26/2019 1.80                 Assessment:          Diagnosis Plan   1. Dyspnea on  exertion  Adult Transthoracic Echo Complete W/ Cont if Necessary Per Protocol    Stress Test With Myocardial Perfusion   2. Presence of cardiac pacemaker  Case Request Cath Lab: PPM battery change. [facility] change out    CBC & Differential    Comprehensive Metabolic Panel    Protime-INR   3. Sick sinus syndrome (CMS/HCC)  Case Request Cath Lab: PPM battery change. [facility] change out    CBC & Differential    Comprehensive Metabolic Panel    Protime-INR   4. Benign essential HTN  Adult Transthoracic Echo Complete W/ Cont if Necessary Per Protocol    Stress Test With Myocardial Perfusion          Plan:      1. HTN - controlled  2. Aortic stenosis, moderate - having increased dyspnea, will repeat echo and get Lexiscan  3. Pacemaker followup - normal device function but at corrina, will schedule for replacement this week    RTC 1 month      Level of Care:                 Kory Corrales MD  05/19/20  .

## 2020-05-20 ENCOUNTER — LAB (OUTPATIENT)
Dept: LAB | Facility: HOSPITAL | Age: 80
End: 2020-05-20

## 2020-05-20 DIAGNOSIS — Z95.0 PRESENCE OF CARDIAC PACEMAKER: ICD-10-CM

## 2020-05-20 DIAGNOSIS — I49.5 SICK SINUS SYNDROME (HCC): ICD-10-CM

## 2020-05-20 DIAGNOSIS — Z01.818 OTHER SPECIFIED PRE-OPERATIVE EXAMINATION: ICD-10-CM

## 2020-05-20 LAB
ALBUMIN SERPL-MCNC: 4.2 G/DL (ref 3.5–5.2)
ALBUMIN/GLOB SERPL: 1.4 G/DL
ALP SERPL-CCNC: 65 U/L (ref 39–117)
ALT SERPL W P-5'-P-CCNC: 13 U/L (ref 1–41)
ANION GAP SERPL CALCULATED.3IONS-SCNC: 9.9 MMOL/L (ref 5–15)
AST SERPL-CCNC: 19 U/L (ref 1–40)
BASOPHILS # BLD AUTO: 0.13 10*3/MM3 (ref 0–0.2)
BASOPHILS NFR BLD AUTO: 1.1 % (ref 0–1.5)
BILIRUB SERPL-MCNC: 0.4 MG/DL (ref 0.2–1.2)
BUN BLD-MCNC: 21 MG/DL (ref 8–23)
BUN/CREAT SERPL: 15.3 (ref 7–25)
CALCIUM SPEC-SCNC: 9.6 MG/DL (ref 8.6–10.5)
CHLORIDE SERPL-SCNC: 99 MMOL/L (ref 98–107)
CO2 SERPL-SCNC: 26.1 MMOL/L (ref 22–29)
CREAT BLD-MCNC: 1.37 MG/DL (ref 0.76–1.27)
DEPRECATED RDW RBC AUTO: 39.4 FL (ref 37–54)
EOSINOPHIL # BLD AUTO: 0.25 10*3/MM3 (ref 0–0.4)
EOSINOPHIL NFR BLD AUTO: 2.2 % (ref 0.3–6.2)
ERYTHROCYTE [DISTWIDTH] IN BLOOD BY AUTOMATED COUNT: 12.4 % (ref 12.3–15.4)
GFR SERPL CREATININE-BSD FRML MDRD: 50 ML/MIN/1.73
GLOBULIN UR ELPH-MCNC: 3.1 GM/DL
GLUCOSE BLD-MCNC: 101 MG/DL (ref 65–99)
HCT VFR BLD AUTO: 38.1 % (ref 37.5–51)
HGB BLD-MCNC: 12.8 G/DL (ref 13–17.7)
IMM GRANULOCYTES # BLD AUTO: 0.04 10*3/MM3 (ref 0–0.05)
IMM GRANULOCYTES NFR BLD AUTO: 0.3 % (ref 0–0.5)
INR PPP: 1.58 (ref 0.9–1.1)
LYMPHOCYTES # BLD AUTO: 2.89 10*3/MM3 (ref 0.7–3.1)
LYMPHOCYTES NFR BLD AUTO: 25.1 % (ref 19.6–45.3)
MCH RBC QN AUTO: 29.4 PG (ref 26.6–33)
MCHC RBC AUTO-ENTMCNC: 33.6 G/DL (ref 31.5–35.7)
MCV RBC AUTO: 87.6 FL (ref 79–97)
MONOCYTES # BLD AUTO: 0.99 10*3/MM3 (ref 0.1–0.9)
MONOCYTES NFR BLD AUTO: 8.6 % (ref 5–12)
NEUTROPHILS # BLD AUTO: 7.22 10*3/MM3 (ref 1.7–7)
NEUTROPHILS NFR BLD AUTO: 62.7 % (ref 42.7–76)
NRBC BLD AUTO-RTO: 0 /100 WBC (ref 0–0.2)
PLATELET # BLD AUTO: 362 10*3/MM3 (ref 140–450)
PMV BLD AUTO: 9.6 FL (ref 6–12)
POTASSIUM BLD-SCNC: 4.5 MMOL/L (ref 3.5–5.2)
PROT SERPL-MCNC: 7.3 G/DL (ref 6–8.5)
PROTHROMBIN TIME: 18.5 SECONDS (ref 11.7–14.2)
RBC # BLD AUTO: 4.35 10*6/MM3 (ref 4.14–5.8)
SODIUM BLD-SCNC: 135 MMOL/L (ref 136–145)
WBC NRBC COR # BLD: 11.52 10*3/MM3 (ref 3.4–10.8)

## 2020-05-20 PROCEDURE — C9803 HOPD COVID-19 SPEC COLLECT: HCPCS

## 2020-05-20 PROCEDURE — U0004 COV-19 TEST NON-CDC HGH THRU: HCPCS | Performed by: INTERNAL MEDICINE

## 2020-05-20 PROCEDURE — 80053 COMPREHEN METABOLIC PANEL: CPT

## 2020-05-20 PROCEDURE — 85610 PROTHROMBIN TIME: CPT

## 2020-05-20 PROCEDURE — 36415 COLL VENOUS BLD VENIPUNCTURE: CPT

## 2020-05-20 PROCEDURE — 85025 COMPLETE CBC W/AUTO DIFF WBC: CPT

## 2020-05-21 LAB
REF LAB TEST METHOD: NORMAL
SARS-COV-2 RNA RESP QL NAA+PROBE: NOT DETECTED

## 2020-05-22 ENCOUNTER — HOSPITAL ENCOUNTER (OUTPATIENT)
Facility: HOSPITAL | Age: 80
Setting detail: HOSPITAL OUTPATIENT SURGERY
Discharge: HOME | End: 2020-05-22
Attending: INTERNAL MEDICINE | Admitting: INTERNAL MEDICINE

## 2020-05-22 VITALS
SYSTOLIC BLOOD PRESSURE: 152 MMHG | TEMPERATURE: 98.2 F | BODY MASS INDEX: 30.94 KG/M2 | HEART RATE: 60 BPM | WEIGHT: 221 LBS | RESPIRATION RATE: 16 BRPM | HEIGHT: 71 IN | OXYGEN SATURATION: 95 % | DIASTOLIC BLOOD PRESSURE: 79 MMHG

## 2020-05-22 DIAGNOSIS — I49.5 SICK SINUS SYNDROME (HCC): ICD-10-CM

## 2020-05-22 DIAGNOSIS — Z95.0 PRESENCE OF CARDIAC PACEMAKER: ICD-10-CM

## 2020-05-22 DIAGNOSIS — I48.0 AF (PAROXYSMAL ATRIAL FIBRILLATION) (HCC): ICD-10-CM

## 2020-05-22 PROCEDURE — 99152 MOD SED SAME PHYS/QHP 5/>YRS: CPT | Performed by: INTERNAL MEDICINE

## 2020-05-22 PROCEDURE — 25010000003 LIDOCAINE 1 % SOLUTION: Performed by: INTERNAL MEDICINE

## 2020-05-22 PROCEDURE — 25010000002 FENTANYL CITRATE (PF) 100 MCG/2ML SOLUTION: Performed by: INTERNAL MEDICINE

## 2020-05-22 PROCEDURE — 25010000003 CEFAZOLIN 1-4 GM/50ML-% SOLUTION: Performed by: INTERNAL MEDICINE

## 2020-05-22 PROCEDURE — 33228 REMV&REPLC PM GEN DUAL LEAD: CPT | Performed by: INTERNAL MEDICINE

## 2020-05-22 PROCEDURE — 25010000002 MIDAZOLAM PER 1 MG: Performed by: INTERNAL MEDICINE

## 2020-05-22 PROCEDURE — 99153 MOD SED SAME PHYS/QHP EA: CPT | Performed by: INTERNAL MEDICINE

## 2020-05-22 PROCEDURE — C1785 PMKR, DUAL, RATE-RESP: HCPCS | Performed by: INTERNAL MEDICINE

## 2020-05-22 DEVICE — GEN PM ASSURITY DR RF: Type: IMPLANTABLE DEVICE | Status: FUNCTIONAL

## 2020-05-22 DEVICE — FLOSEAL HEMOSTATIC MATRIX, 10ML
Type: IMPLANTABLE DEVICE | Status: FUNCTIONAL
Brand: FLOSEAL HEMOSTATIC MATRIX

## 2020-05-22 RX ORDER — CEPHALEXIN 500 MG/1
500 CAPSULE ORAL 4 TIMES DAILY
Qty: 12 CAPSULE | Refills: 0 | Status: SHIPPED | OUTPATIENT
Start: 2020-05-22 | End: 2020-05-25

## 2020-05-22 RX ORDER — SODIUM CHLORIDE 0.9 % (FLUSH) 0.9 %
3 SYRINGE (ML) INJECTION EVERY 12 HOURS SCHEDULED
Status: DISCONTINUED | OUTPATIENT
Start: 2020-05-22 | End: 2020-05-22 | Stop reason: HOSPADM

## 2020-05-22 RX ORDER — LIDOCAINE HYDROCHLORIDE 10 MG/ML
0.1 INJECTION, SOLUTION EPIDURAL; INFILTRATION; INTRACAUDAL; PERINEURAL ONCE AS NEEDED
Status: DISCONTINUED | OUTPATIENT
Start: 2020-05-22 | End: 2020-05-22 | Stop reason: HOSPADM

## 2020-05-22 RX ORDER — SODIUM CHLORIDE 0.9 % (FLUSH) 0.9 %
10 SYRINGE (ML) INJECTION AS NEEDED
Status: DISCONTINUED | OUTPATIENT
Start: 2020-05-22 | End: 2020-05-22 | Stop reason: HOSPADM

## 2020-05-22 RX ORDER — CEFAZOLIN SODIUM 2 G/100ML
2 INJECTION, SOLUTION INTRAVENOUS ONCE
Status: DISCONTINUED | OUTPATIENT
Start: 2020-05-22 | End: 2020-05-22 | Stop reason: HOSPADM

## 2020-05-22 RX ORDER — FENTANYL CITRATE 50 UG/ML
INJECTION, SOLUTION INTRAMUSCULAR; INTRAVENOUS AS NEEDED
Status: DISCONTINUED | OUTPATIENT
Start: 2020-05-22 | End: 2020-05-22 | Stop reason: HOSPADM

## 2020-05-22 RX ORDER — LIDOCAINE HYDROCHLORIDE 10 MG/ML
INJECTION, SOLUTION INFILTRATION; PERINEURAL AS NEEDED
Status: DISCONTINUED | OUTPATIENT
Start: 2020-05-22 | End: 2020-05-22 | Stop reason: HOSPADM

## 2020-05-22 RX ORDER — MIDAZOLAM HYDROCHLORIDE 1 MG/ML
INJECTION INTRAMUSCULAR; INTRAVENOUS AS NEEDED
Status: DISCONTINUED | OUTPATIENT
Start: 2020-05-22 | End: 2020-05-22 | Stop reason: HOSPADM

## 2020-05-22 RX ORDER — CEFAZOLIN SODIUM 1 G/50ML
INJECTION, SOLUTION INTRAVENOUS CONTINUOUS PRN
Status: COMPLETED | OUTPATIENT
Start: 2020-05-22 | End: 2020-05-22

## 2020-05-22 RX ORDER — SODIUM CHLORIDE 9 MG/ML
75 INJECTION, SOLUTION INTRAVENOUS CONTINUOUS
Status: DISCONTINUED | OUTPATIENT
Start: 2020-05-22 | End: 2020-05-22 | Stop reason: HOSPADM

## 2020-05-22 RX ADMIN — SODIUM CHLORIDE 75 ML/HR: 9 INJECTION, SOLUTION INTRAVENOUS at 10:29

## 2020-05-22 NOTE — DISCHARGE INSTRUCTIONS
"[facility]   [phone]    Post Pacemaker / Defibrillator Implant Instructions      1.  Outside dressing may be removed the tomorrow.    2. If steri-strips were used, they should NOT  be removed. Allow them to \"fall off\" on their own.    3. Keep incision site dry for 3 days. Do not submerge under water. You may shower after the dressing is removed. Do not allow shower water to hit directly on incision.    4. No lotion/powder/ointment/cream on incision until it is healed.    5. Gently wash incision daily with soap and water and pat dry.    6. You may reapply a dressing if there is drainage, otherwise leave your incision open to air. If you reapply a dressing, please notify the pacemaker clinic.    7. No heavy lifting, pulling, or pushing.    8. Do not raise the affected arm over your head for a minimum of 1 month.    9. The pacemaker clinic will contact you (usually within 1 business day) to schedule a pacemaker/incision check. The check is usually done 7-10 days post-implant. If you have not heard from the pacemaker clinic within 3 days, please call the office.    10. Please call the office if you experience any of the following:   bleeding or drainage from your incision   swelling, redness, or opening of your incision   fever or chills   pain not relieved with medication   chest pain or difficulty breathing   lightheadness    11. For defibrillator patients only: If you receive a shock from your device, please call the office. If you receive 2 or more shocks within a 24 hour period OR if you receive 1 shock and feel poorly, you should be evaluated in the emergency room. Please DO NOT DRIVE if you have received a shock until your device has been checked.  "

## 2020-05-22 NOTE — H&P
Patient Care Team:  Rakehs Vieira MD as PCP - General (Family Medicine)  Deam, Kory Mcfadden MD as Consulting Physician (Cardiac Electrophysiology)    Chief complaint Presents for pacemaker replacement      History of Present Illness   Patient presensts for  followup of his pacemaker and atrial fibrillation.  Mr Mauricio is an 78 yo with a past medical history significant for HTN and AV block who had a pacemaker implanted in 2009.  He also was noted to have some paroxysmal atrial fibrillation on remote monitoring.     He had an echo 11/19 which showed an EF of 45-50%, concentric LVH with grade 1 diastolic dysfunction, normal RV size and function, moderate AS without AR, MAC wtihout MS/MR.     Since he was last seen 6 months ago Mr Mauricio now complains of having exertional dyspnea.  He notices dyspnea with climbing a flight of stairs or carrying objects.  There is no associated chest discomfort or palpitations.  He denies any rest symptoms, PND, orthopnea or peripheral edema.    Review of Systems   Respiratory: Positive for shortness of breath.    Cardiovascular: Negative for chest pain and palpitations.   All other systems reviewed and are negative.         Past Medical History:   Diagnosis Date   • Atrial fibrillation (CMS/HCC)    • Benign essential HTN    • Pacemaker    • Sick sinus syndrome (CMS/HCC)        Objective      Vital Signs       Physical Exam   Constitutional: He is oriented to person, place, and time. He appears well-developed and well-nourished.   HENT:   Head: Normocephalic and atraumatic.   Eyes: Pupils are equal, round, and reactive to light. EOM are normal.   Neck: Normal range of motion. Neck supple.   Cardiovascular: Normal rate and regular rhythm.   Pulmonary/Chest: Effort normal and breath sounds normal.   Abdominal: Soft. Bowel sounds are normal.   Musculoskeletal: Normal range of motion.   Neurological: He is alert and oriented to person, place, and time.   Skin: Skin is warm and  dry.   Psychiatric: He has a normal mood and affect.       Results Review:    I reviewed the patient's new clinical results.      Assessment/Plan       Sick sinus syndrome (CMS/HCC)    Presence of cardiac pacemaker      Assessment & Plan   1. HTN - controlled  2. Aortic stenosis, moderate - having increased dyspnea, will repeat echo and get Lexiscan  3. Pacemaker followup - normal device function but at corrina, will schedule for replacement this week    I discussed the patients findings and my recommendations with patient    Kory Bogdan Corrales MD  05/22/20  05:03

## 2020-05-22 NOTE — DISCHARGE SUMMARY
[facility]    DISCHARGE SUMMARY      Patient Name: Ronni Mauricio  :1940  79 y.o.    Date of Admit: 2020  Date of Discharge:  2020    Discharge Diagnosis:  Problem List Items Addressed This Visit        Cardiovascular and Mediastinum    Sick sinus syndrome (CMS/HCC)    Relevant Orders    EP/CRM Study (Completed)    Presence of cardiac pacemaker    Relevant Orders    EP/CRM Study (Completed)      Other Visit Diagnoses     AF (paroxysmal atrial fibrillation) (CMS/HCC)              Hospital Course: Patient had uneventful pacemaker replacement.      Procedures Performed  Procedure(s):  PPM battery change. [facility] change out       Consults     No orders found from 2020 to 2020.          Pertinent Test Results:   Results from last 7 days   Lab Units 20  1228   SODIUM mmol/L 135*   POTASSIUM mmol/L 4.5   CHLORIDE mmol/L 99   CO2 mmol/L 26.1   BUN mg/dL 21   CREATININE mg/dL 1.37*   CALCIUM mg/dL 9.6   BILIRUBIN mg/dL 0.4   ALK PHOS U/L 65   ALT (SGPT) U/L 13   AST (SGOT) U/L 19   GLUCOSE mg/dL 101*         @LABRCNT(bnp)@  Results from last 7 days   Lab Units 20  1228   WBC 10*3/mm3 11.52*   HEMOGLOBIN g/dL 12.8*   HEMATOCRIT % 38.1   PLATELETS 10*3/mm3 362     Results from last 7 days   Lab Units 20  1228   INR  1.58*               Condition on Discharge: stable    Discharge Medications     Discharge Medications      New Medications      Instructions Start Date   cephalexin 500 MG capsule  Commonly known as:  Keflex   500 mg, Oral, 4 Times Daily         ASK your doctor about these medications      Instructions Start Date   amLODIPine-benazepril 10-20 MG per capsule  Commonly known as:  LOTREL   10-20 capsules, Oral, Daily      aspirin 325 MG tablet   325 mg, Oral, Daily      meloxicam 15 MG tablet  Commonly known as:  MOBIC   15 mg, Oral, Daily      rivaroxaban 20 MG tablet  Commonly known as:  XARELTO   20 mg, Oral, Daily             Discharge Diet:      Activity at Discharge:     Discharge disposition: home    Follow-up Appointments  No future appointments.      Test Results Pending at Discharge       Kory Corrales MD, FACC    05/22/20  12:40

## 2020-06-01 ENCOUNTER — OFFICE VISIT (OUTPATIENT)
Dept: CARDIOLOGY | Facility: CLINIC | Age: 80
End: 2020-06-01

## 2020-06-01 VITALS
HEART RATE: 76 BPM | WEIGHT: 220 LBS | RESPIRATION RATE: 16 BRPM | BODY MASS INDEX: 30.8 KG/M2 | SYSTOLIC BLOOD PRESSURE: 138 MMHG | DIASTOLIC BLOOD PRESSURE: 64 MMHG | HEIGHT: 71 IN

## 2020-06-01 DIAGNOSIS — I49.5 SICK SINUS SYNDROME (HCC): ICD-10-CM

## 2020-06-01 DIAGNOSIS — Z95.0 PRESENCE OF CARDIAC PACEMAKER: Primary | ICD-10-CM

## 2020-06-01 DIAGNOSIS — I35.0 AORTIC STENOSIS, MODERATE: ICD-10-CM

## 2020-06-01 DIAGNOSIS — I10 BENIGN ESSENTIAL HTN: ICD-10-CM

## 2020-06-01 PROCEDURE — 93288 INTERROG EVL PM/LDLS PM IP: CPT | Performed by: INTERNAL MEDICINE

## 2020-06-01 PROCEDURE — 99212 OFFICE O/P EST SF 10 MIN: CPT | Performed by: INTERNAL MEDICINE

## 2020-06-01 NOTE — PROGRESS NOTES
Subjective:     Encounter Date:06/01/2020      Patient ID: Ronni Mauricio is a 79 y.o. male.    Chief Complaint:  Followup post pacemaker replacement    HPI:  Patient presensts for  followup after pacemaker replacement 2 weeks ago.  Mr Mauricio is an 78 yo with a past medical history significant for HTN and AV block who had a pacemaker implanted in 2009.  He also was noted to have some paroxysmal atrial fibrillation on remote monitoring.     He had an echo 11/19 which showed an EF of 45-50%, concentric LVH with grade 1 diastolic dysfunction, normal RV size and function, moderate AS without AR, MAC wtihout MS/MR.     Since his device was replaced, he has been doing well with the exception of continued exertional dyspnea.      The following portions of the patient's history were reviewed and updated as appropriate: allergies, current medications, past family history, past medical history, past social history, past surgical history and problem list.    Problem List:  Patient Active Problem List   Diagnosis   • Benign essential HTN   • Sick sinus syndrome (CMS/HCC)   • Presence of cardiac pacemaker   • Heart murmur   • Aortic stenosis, moderate   • Diastolic dysfunction       Past Medical History:  Past Medical History:   Diagnosis Date   • Atrial fibrillation (CMS/HCC)    • Benign essential HTN    • Pacemaker    • Sick sinus syndrome (CMS/HCC)        Past Surgical History:  Past Surgical History:   Procedure Laterality Date   • CARDIAC ELECTROPHYSIOLOGY PROCEDURE N/A 5/22/2020    Procedure: PPM battery change. [facility] change out;  Surgeon: Kory Corrales MD;  Location: [facility] INVASIVE LOCATION;  Service: Cardiology;  Laterality: N/A;   • INSERT / REPLACE / REMOVE PACEMAKER      [facility]   • PACEMAKER REPLACEMENT  05/22/2020    [facility]    • SINUS SURGERY         Social History:  Social History     Socioeconomic History   • Marital status:      Spouse name: Not on file   • Number of children:  "Not on file   • Years of education: Not on file   • Highest education level: Not on file   Tobacco Use   • Smoking status: Never Smoker   • Smokeless tobacco: Former User     Types: Chew   Substance and Sexual Activity   • Alcohol use: Yes     Comment: occasional   • Drug use: No       Allergies:  Allergies   Allergen Reactions   • Codeine Itching   • Morphine Itching       Immunizations:    There is no immunization history on file for this patient.    ROS:  Review of Systems   Constitution: Positive for malaise/fatigue.   Cardiovascular: Positive for dyspnea on exertion. Negative for chest pain, irregular heartbeat, leg swelling, near-syncope, orthopnea, palpitations, paroxysmal nocturnal dyspnea and syncope.   Respiratory: Positive for shortness of breath.    All other systems reviewed and are negative.         Objective:         /64   Pulse 76   Resp 16   Ht 180.3 cm (71\")   Wt 99.8 kg (220 lb)   BMI 30.68 kg/m²     Physical Exam   Constitutional: He is oriented to person, place, and time. He appears well-developed and well-nourished. No distress.   HENT:   Head: Normocephalic and atraumatic.   Eyes: Pupils are equal, round, and reactive to light. Conjunctivae and EOM are normal. No scleral icterus.   Neck: Normal range of motion. No thyromegaly present.   Cardiovascular: Normal rate and regular rhythm.   Murmur (1-2/6 rachael) heard.  Pulmonary/Chest: Effort normal and breath sounds normal.   Abdominal: Soft. Bowel sounds are normal.   Musculoskeletal: Normal range of motion.   Neurological: He is alert and oriented to person, place, and time.   Skin: Skin is warm and dry.   Psychiatric: He has a normal mood and affect.       In-Office Procedure(s):  Procedures Pacemaker Eval interpreted by me  SJ 2240  Battery MAYE    P wave 3mv  Threshold 0.5V  Impedance 30 ohms    R wave paced  Threshold 0.5V  Impedance 600 ohms    Events - 99% V paced    ASCVD RIsk Score::  The ASCVD Risk score ([place] AVINA Jr., et al., " 2013) failed to calculate for the following reasons:    The 2013 ASCVD risk score is only valid for ages 40 to 79    Recent Radiology:  Imaging Results (Most Recent)     None          Lab Review:   Lab on 05/20/2020   Component Date Value   • Glucose 05/20/2020 101*   • BUN 05/20/2020 21    • Creatinine 05/20/2020 1.37*   • Sodium 05/20/2020 135*   • Potassium 05/20/2020 4.5    • Chloride 05/20/2020 99    • CO2 05/20/2020 26.1    • Calcium 05/20/2020 9.6    • Total Protein 05/20/2020 7.3    • Albumin 05/20/2020 4.20    • ALT (SGPT) 05/20/2020 13    • AST (SGOT) 05/20/2020 19    • Alkaline Phosphatase 05/20/2020 65    • Total Bilirubin 05/20/2020 0.4    • eGFR Non African Amer 05/20/2020 50*   • Globulin 05/20/2020 3.1    • A/G Ratio 05/20/2020 1.4    • BUN/Creatinine Ratio 05/20/2020 15.3    • Anion Gap 05/20/2020 9.9    • Protime 05/20/2020 18.5*   • INR 05/20/2020 1.58*   • WBC 05/20/2020 11.52*   • RBC 05/20/2020 4.35    • Hemoglobin 05/20/2020 12.8*   • Hematocrit 05/20/2020 38.1    • MCV 05/20/2020 87.6    • MCH 05/20/2020 29.4    • MCHC 05/20/2020 33.6    • RDW 05/20/2020 12.4    • RDW-SD 05/20/2020 39.4    • MPV 05/20/2020 9.6    • Platelets 05/20/2020 362    • Neutrophil % 05/20/2020 62.7    • Lymphocyte % 05/20/2020 25.1    • Monocyte % 05/20/2020 8.6    • Eosinophil % 05/20/2020 2.2    • Basophil % 05/20/2020 1.1    • Immature Grans % 05/20/2020 0.3    • Neutrophils, Absolute 05/20/2020 7.22*   • Lymphocytes, Absolute 05/20/2020 2.89    • Monocytes, Absolute 05/20/2020 0.99*   • Eosinophils, Absolute 05/20/2020 0.25    • Basophils, Absolute 05/20/2020 0.13    • Immature Grans, Absolute 05/20/2020 0.04    • nRBC 05/20/2020 0.0    Lab on 05/20/2020   Component Date Value   • COVID19 05/20/2020 Not Detected                 Assessment:          Diagnosis Plan   1. Presence of cardiac pacemaker     2. Sick sinus syndrome (CMS/HCC)     3. Benign essential HTN     4. Aortic stenosis, moderate            Plan:       1. AV block - s/p pacemaker  2. Pacemaker followup - normal device function, wound well healed  3. Valvular heart disease, moderate AS - increased exertional dyspnea scheduled for echo and nuclear stress test    RTC 6 months      Level of Care:                 Kory Corrales MD  06/01/20  .

## 2020-06-22 ENCOUNTER — HOSPITAL ENCOUNTER (OUTPATIENT)
Dept: CARDIOLOGY | Facility: HOSPITAL | Age: 80
Discharge: HOME | End: 2020-06-22
Admitting: INTERNAL MEDICINE

## 2020-06-22 ENCOUNTER — HOSPITAL ENCOUNTER (OUTPATIENT)
Dept: NUCLEAR MEDICINE | Facility: HOSPITAL | Age: 80
Discharge: HOME | End: 2020-06-22

## 2020-06-22 DIAGNOSIS — I10 BENIGN ESSENTIAL HTN: ICD-10-CM

## 2020-06-22 DIAGNOSIS — R06.09 DYSPNEA ON EXERTION: ICD-10-CM

## 2020-06-22 LAB
BH CV ECHO MEAS - ACS: 1.4 CM
BH CV ECHO MEAS - AO MAX PG (FULL): 17.7 MMHG
BH CV ECHO MEAS - AO MAX PG: 21 MMHG
BH CV ECHO MEAS - AO MEAN PG (FULL): 10.8 MMHG
BH CV ECHO MEAS - AO MEAN PG: 12.5 MMHG
BH CV ECHO MEAS - AO ROOT AREA (BSA CORRECTED): 1.3
BH CV ECHO MEAS - AO ROOT AREA: 5.9 CM^2
BH CV ECHO MEAS - AO ROOT DIAM: 2.7 CM
BH CV ECHO MEAS - AO V2 MAX: 229.4 CM/SEC
BH CV ECHO MEAS - AO V2 MEAN: 167.4 CM/SEC
BH CV ECHO MEAS - AO V2 VTI: 55.4 CM
BH CV ECHO MEAS - AVA(I,A): 1.4 CM^2
BH CV ECHO MEAS - AVA(I,D): 1.4 CM^2
BH CV ECHO MEAS - AVA(V,A): 1.3 CM^2
BH CV ECHO MEAS - AVA(V,D): 1.3 CM^2
BH CV ECHO MEAS - BSA(HAYCOCK): 2.2 M^2
BH CV ECHO MEAS - BSA: 2.2 M^2
BH CV ECHO MEAS - BZI_BMI: 31.6 KILOGRAMS/M^2
BH CV ECHO MEAS - BZI_METRIC_HEIGHT: 177.8 CM
BH CV ECHO MEAS - BZI_METRIC_WEIGHT: 99.8 KG
BH CV ECHO MEAS - EDV(CUBED): 100.8 ML
BH CV ECHO MEAS - EDV(MOD-SP2): 80 ML
BH CV ECHO MEAS - EDV(MOD-SP4): 97 ML
BH CV ECHO MEAS - EDV(TEICH): 100.1 ML
BH CV ECHO MEAS - EF(CUBED): 50.6 %
BH CV ECHO MEAS - EF(MOD-BP): 50.7 %
BH CV ECHO MEAS - EF(MOD-SP2): 45 %
BH CV ECHO MEAS - EF(MOD-SP4): 55.7 %
BH CV ECHO MEAS - EF(TEICH): 42.7 %
BH CV ECHO MEAS - ESV(CUBED): 49.8 ML
BH CV ECHO MEAS - ESV(MOD-SP2): 44 ML
BH CV ECHO MEAS - ESV(MOD-SP4): 43 ML
BH CV ECHO MEAS - ESV(TEICH): 57.4 ML
BH CV ECHO MEAS - FS: 20.9 %
BH CV ECHO MEAS - IVS/LVPW: 0.99
BH CV ECHO MEAS - IVSD: 1.3 CM
BH CV ECHO MEAS - LAT PEAK E' VEL: 5.3 CM/SEC
BH CV ECHO MEAS - LV DIASTOLIC VOL/BSA (35-75): 44.6 ML/M^2
BH CV ECHO MEAS - LV MASS(C)D: 228.2 GRAMS
BH CV ECHO MEAS - LV MASS(C)DI: 105 GRAMS/M^2
BH CV ECHO MEAS - LV MAX PG: 3.3 MMHG
BH CV ECHO MEAS - LV MEAN PG: 1.8 MMHG
BH CV ECHO MEAS - LV SYSTOLIC VOL/BSA (12-30): 19.8 ML/M^2
BH CV ECHO MEAS - LV V1 MAX: 91.3 CM/SEC
BH CV ECHO MEAS - LV V1 MEAN: 61.5 CM/SEC
BH CV ECHO MEAS - LV V1 VTI: 24 CM
BH CV ECHO MEAS - LVIDD: 4.7 CM
BH CV ECHO MEAS - LVIDS: 3.7 CM
BH CV ECHO MEAS - LVLD AP2: 8.3 CM
BH CV ECHO MEAS - LVLD AP4: 8.1 CM
BH CV ECHO MEAS - LVLS AP2: 7.1 CM
BH CV ECHO MEAS - LVLS AP4: 6.9 CM
BH CV ECHO MEAS - LVOT AREA (M): 3.1 CM^2
BH CV ECHO MEAS - LVOT AREA: 3.3 CM^2
BH CV ECHO MEAS - LVOT DIAM: 2 CM
BH CV ECHO MEAS - LVPWD: 1.3 CM
BH CV ECHO MEAS - MED PEAK E' VEL: 6.3 CM/SEC
BH CV ECHO MEAS - MV A DUR: 0.16 SEC
BH CV ECHO MEAS - MV A MAX VEL: 109.4 CM/SEC
BH CV ECHO MEAS - MV DEC SLOPE: 266.2 CM/SEC^2
BH CV ECHO MEAS - MV DEC TIME: 363 SEC
BH CV ECHO MEAS - MV E MAX VEL: 67.8 CM/SEC
BH CV ECHO MEAS - MV E/A: 0.62
BH CV ECHO MEAS - MV MAX PG: 6.2 MMHG
BH CV ECHO MEAS - MV MEAN PG: 1.8 MMHG
BH CV ECHO MEAS - MV P1/2T MAX VEL: 90.3 CM/SEC
BH CV ECHO MEAS - MV P1/2T: 99.3 MSEC
BH CV ECHO MEAS - MV V2 MAX: 124.2 CM/SEC
BH CV ECHO MEAS - MV V2 MEAN: 61.2 CM/SEC
BH CV ECHO MEAS - MV V2 VTI: 39 CM
BH CV ECHO MEAS - MVA P1/2T LCG: 2.4 CM^2
BH CV ECHO MEAS - MVA(P1/2T): 2.2 CM^2
BH CV ECHO MEAS - MVA(VTI): 2 CM^2
BH CV ECHO MEAS - PA ACC TIME: 0.11 SEC
BH CV ECHO MEAS - PA MAX PG (FULL): 4.1 MMHG
BH CV ECHO MEAS - PA MAX PG: 6.9 MMHG
BH CV ECHO MEAS - PA PR(ACCEL): 31.5 MMHG
BH CV ECHO MEAS - PA V2 MAX: 131.7 CM/SEC
BH CV ECHO MEAS - PULM A REVS DUR: 0.11 SEC
BH CV ECHO MEAS - PULM A REVS VEL: 21.4 CM/SEC
BH CV ECHO MEAS - PULM DIAS VEL: 39 CM/SEC
BH CV ECHO MEAS - PULM S/D: 1.8
BH CV ECHO MEAS - PULM SYS VEL: 68.6 CM/SEC
BH CV ECHO MEAS - RAP SYSTOLE: 3 MMHG
BH CV ECHO MEAS - RV MAX PG: 2.9 MMHG
BH CV ECHO MEAS - RV MEAN PG: 1.1 MMHG
BH CV ECHO MEAS - RV V1 MAX: 84.8 CM/SEC
BH CV ECHO MEAS - RV V1 MEAN: 45.8 CM/SEC
BH CV ECHO MEAS - RV V1 VTI: 13.9 CM
BH CV ECHO MEAS - RVSP: 21.6 MMHG
BH CV ECHO MEAS - SI(AO): 151.2 ML/M^2
BH CV ECHO MEAS - SI(CUBED): 23.5 ML/M^2
BH CV ECHO MEAS - SI(LVOT): 36 ML/M^2
BH CV ECHO MEAS - SI(MOD-SP2): 16.6 ML/M^2
BH CV ECHO MEAS - SI(MOD-SP4): 24.8 ML/M^2
BH CV ECHO MEAS - SI(TEICH): 19.6 ML/M^2
BH CV ECHO MEAS - SV(AO): 328.7 ML
BH CV ECHO MEAS - SV(CUBED): 51 ML
BH CV ECHO MEAS - SV(LVOT): 78.2 ML
BH CV ECHO MEAS - SV(MOD-SP2): 36 ML
BH CV ECHO MEAS - SV(MOD-SP4): 54 ML
BH CV ECHO MEAS - SV(TEICH): 42.7 ML
BH CV ECHO MEAS - TAPSE (>1.6): 2.7 CM2
BH CV ECHO MEAS - TR MAX VEL: 215.8 CM/SEC
BH CV ECHO MEASUREMENTS AVERAGE E/E' RATIO: 11.69
BH CV VAS BP RIGHT ARM: NORMAL MMHG
BH CV XLRA - TDI S': 14 CM/SEC
LEFT ATRIUM VOLUME INDEX: 28 ML/M2
MAXIMAL PREDICTED HEART RATE: 139 BPM
STRESS TARGET HR: 118 BPM

## 2020-06-22 PROCEDURE — 78452 HT MUSCLE IMAGE SPECT MULT: CPT | Performed by: INTERNAL MEDICINE

## 2020-06-22 PROCEDURE — 93306 TTE W/DOPPLER COMPLETE: CPT | Performed by: INTERNAL MEDICINE

## 2020-06-22 PROCEDURE — 25010000002 REGADENOSON 0.4 MG/5ML SOLUTION: Performed by: INTERNAL MEDICINE

## 2020-06-22 PROCEDURE — A9500 TC99M SESTAMIBI: HCPCS | Performed by: INTERNAL MEDICINE

## 2020-06-22 PROCEDURE — 93306 TTE W/DOPPLER COMPLETE: CPT

## 2020-06-22 PROCEDURE — 0 TECHNETIUM SESTAMIBI: Performed by: INTERNAL MEDICINE

## 2020-06-22 PROCEDURE — 93018 CV STRESS TEST I&R ONLY: CPT | Performed by: INTERNAL MEDICINE

## 2020-06-22 PROCEDURE — 93017 CV STRESS TEST TRACING ONLY: CPT

## 2020-06-22 PROCEDURE — 78452 HT MUSCLE IMAGE SPECT MULT: CPT

## 2020-06-22 RX ADMIN — TECHNETIUM TC 99M SESTAMIBI 1 DOSE: 1 INJECTION INTRAVENOUS at 08:08

## 2020-06-22 RX ADMIN — TECHNETIUM TC 99M SESTAMIBI 1 DOSE: 1 INJECTION INTRAVENOUS at 10:52

## 2020-06-22 RX ADMIN — REGADENOSON 0.4 MG: 0.08 INJECTION, SOLUTION INTRAVENOUS at 10:52

## 2020-06-23 LAB
BH CV STRESS COMMENTS STAGE 1: NORMAL
BH CV STRESS DOSE REGADENOSON STAGE 1: 0.4
BH CV STRESS DURATION MIN STAGE 1: 0
BH CV STRESS DURATION SEC STAGE 1: 10
BH CV STRESS PROTOCOL 1: NORMAL
BH CV STRESS RECOVERY BP: NORMAL MMHG
BH CV STRESS RECOVERY HR: 65 BPM
BH CV STRESS STAGE 1: 1
MAXIMAL PREDICTED HEART RATE: 139 BPM
PERCENT MAX PREDICTED HR: 54.68 %
STRESS BASELINE BP: NORMAL MMHG
STRESS BASELINE HR: 60 BPM
STRESS PERCENT HR: 64 %
STRESS POST PEAK BP: NORMAL MMHG
STRESS POST PEAK HR: 76 BPM
STRESS TARGET HR: 118 BPM

## 2020-06-25 ENCOUNTER — TELEPHONE (OUTPATIENT)
Dept: CARDIOLOGY | Facility: CLINIC | Age: 80
End: 2020-06-25

## 2020-06-25 DIAGNOSIS — R94.39 ABNORMAL NUCLEAR STRESS TEST: ICD-10-CM

## 2020-06-25 DIAGNOSIS — R06.09 DYSPNEA ON EXERTION: Primary | ICD-10-CM

## 2020-06-25 DIAGNOSIS — I48.0 PAROXYSMAL ATRIAL FIBRILLATION (HCC): ICD-10-CM

## 2020-06-25 NOTE — TELEPHONE ENCOUNTER
Patient calling for echo and stress results from Monday. Could you please review with dr white and call patient. Thanks

## 2020-06-26 ENCOUNTER — TELEPHONE (OUTPATIENT)
Dept: CARDIOLOGY | Facility: CLINIC | Age: 80
End: 2020-06-26

## 2020-06-29 NOTE — TELEPHONE ENCOUNTER
Reviewed with Dr Corrales: recommend heart cath.  I spoke with pt and informed him of recommendations, he voiced understanding. Will proceed with heart cath with Dr Fang. RTRN

## 2020-07-02 PROBLEM — R06.09 DYSPNEA ON EXERTION: Status: ACTIVE | Noted: 2020-07-02

## 2020-07-02 PROBLEM — R94.39 ABNORMAL NUCLEAR STRESS TEST: Status: ACTIVE | Noted: 2020-07-02

## 2020-07-02 PROBLEM — I48.0 PAROXYSMAL ATRIAL FIBRILLATION (HCC): Status: ACTIVE | Noted: 2020-07-02

## 2020-07-06 ENCOUNTER — TRANSCRIBE ORDERS (OUTPATIENT)
Dept: SLEEP MEDICINE | Facility: HOSPITAL | Age: 80
End: 2020-07-06

## 2020-07-06 DIAGNOSIS — Z01.818 OTHER SPECIFIED PRE-OPERATIVE EXAMINATION: Primary | ICD-10-CM

## 2020-07-07 ENCOUNTER — LAB (OUTPATIENT)
Dept: LAB | Facility: HOSPITAL | Age: 80
End: 2020-07-07

## 2020-07-07 DIAGNOSIS — Z01.818 OTHER SPECIFIED PRE-OPERATIVE EXAMINATION: ICD-10-CM

## 2020-07-07 DIAGNOSIS — I48.0 PAROXYSMAL ATRIAL FIBRILLATION (HCC): ICD-10-CM

## 2020-07-07 DIAGNOSIS — R94.39 ABNORMAL NUCLEAR STRESS TEST: ICD-10-CM

## 2020-07-07 DIAGNOSIS — R06.09 DYSPNEA ON EXERTION: ICD-10-CM

## 2020-07-07 LAB
ALBUMIN SERPL-MCNC: 4.3 G/DL (ref 3.5–5.2)
ALBUMIN/GLOB SERPL: 1.4 G/DL
ALP SERPL-CCNC: 65 U/L (ref 39–117)
ALT SERPL W P-5'-P-CCNC: 12 U/L (ref 1–41)
ANION GAP SERPL CALCULATED.3IONS-SCNC: 8.5 MMOL/L (ref 5–15)
AST SERPL-CCNC: 17 U/L (ref 1–40)
BASOPHILS # BLD AUTO: 0.14 10*3/MM3 (ref 0–0.2)
BASOPHILS NFR BLD AUTO: 1.4 % (ref 0–1.5)
BILIRUB SERPL-MCNC: 0.5 MG/DL (ref 0–1.2)
BUN SERPL-MCNC: 18 MG/DL (ref 8–23)
BUN/CREAT SERPL: 14.2 (ref 7–25)
CALCIUM SPEC-SCNC: 9.6 MG/DL (ref 8.6–10.5)
CHLORIDE SERPL-SCNC: 103 MMOL/L (ref 98–107)
CO2 SERPL-SCNC: 26.5 MMOL/L (ref 22–29)
CREAT SERPL-MCNC: 1.27 MG/DL (ref 0.76–1.27)
DEPRECATED RDW RBC AUTO: 38.2 FL (ref 37–54)
EOSINOPHIL # BLD AUTO: 0.25 10*3/MM3 (ref 0–0.4)
EOSINOPHIL NFR BLD AUTO: 2.4 % (ref 0.3–6.2)
ERYTHROCYTE [DISTWIDTH] IN BLOOD BY AUTOMATED COUNT: 12.5 % (ref 12.3–15.4)
GFR SERPL CREATININE-BSD FRML MDRD: 54 ML/MIN/1.73
GLOBULIN UR ELPH-MCNC: 3 GM/DL
GLUCOSE SERPL-MCNC: 101 MG/DL (ref 65–99)
HCT VFR BLD AUTO: 39.1 % (ref 37.5–51)
HGB BLD-MCNC: 12.6 G/DL (ref 13–17.7)
IMM GRANULOCYTES # BLD AUTO: 0.07 10*3/MM3 (ref 0–0.05)
IMM GRANULOCYTES NFR BLD AUTO: 0.7 % (ref 0–0.5)
INR PPP: 1.38 (ref 0.9–1.1)
LYMPHOCYTES # BLD AUTO: 2.57 10*3/MM3 (ref 0.7–3.1)
LYMPHOCYTES NFR BLD AUTO: 24.8 % (ref 19.6–45.3)
MCH RBC QN AUTO: 27.5 PG (ref 26.6–33)
MCHC RBC AUTO-ENTMCNC: 32.2 G/DL (ref 31.5–35.7)
MCV RBC AUTO: 85.2 FL (ref 79–97)
MONOCYTES # BLD AUTO: 0.93 10*3/MM3 (ref 0.1–0.9)
MONOCYTES NFR BLD AUTO: 9 % (ref 5–12)
NEUTROPHILS NFR BLD AUTO: 6.4 10*3/MM3 (ref 1.7–7)
NEUTROPHILS NFR BLD AUTO: 61.7 % (ref 42.7–76)
NRBC BLD AUTO-RTO: 0 /100 WBC (ref 0–0.2)
PLATELET # BLD AUTO: 383 10*3/MM3 (ref 140–450)
PMV BLD AUTO: 10 FL (ref 6–12)
POTASSIUM SERPL-SCNC: 4.5 MMOL/L (ref 3.5–5.2)
PROT SERPL-MCNC: 7.3 G/DL (ref 6–8.5)
PROTHROMBIN TIME: 16.7 SECONDS (ref 11.7–14.2)
RBC # BLD AUTO: 4.59 10*6/MM3 (ref 4.14–5.8)
SODIUM SERPL-SCNC: 138 MMOL/L (ref 136–145)
WBC # BLD AUTO: 10.36 10*3/MM3 (ref 3.4–10.8)

## 2020-07-07 PROCEDURE — 85610 PROTHROMBIN TIME: CPT

## 2020-07-07 PROCEDURE — 85025 COMPLETE CBC W/AUTO DIFF WBC: CPT

## 2020-07-07 PROCEDURE — 80053 COMPREHEN METABOLIC PANEL: CPT

## 2020-07-07 PROCEDURE — 36415 COLL VENOUS BLD VENIPUNCTURE: CPT

## 2020-07-07 PROCEDURE — U0004 COV-19 TEST NON-CDC HGH THRU: HCPCS

## 2020-07-07 PROCEDURE — C9803 HOPD COVID-19 SPEC COLLECT: HCPCS

## 2020-07-08 LAB
REF LAB TEST METHOD: NORMAL
SARS-COV-2 RNA RESP QL NAA+PROBE: NOT DETECTED

## 2020-07-09 ENCOUNTER — HOSPITAL ENCOUNTER (OUTPATIENT)
Facility: HOSPITAL | Age: 80
Setting detail: HOSPITAL OUTPATIENT SURGERY
Discharge: HOME | End: 2020-07-09
Attending: INTERNAL MEDICINE | Admitting: INTERNAL MEDICINE

## 2020-07-09 VITALS
TEMPERATURE: 98 F | DIASTOLIC BLOOD PRESSURE: 77 MMHG | HEIGHT: 70 IN | SYSTOLIC BLOOD PRESSURE: 149 MMHG | OXYGEN SATURATION: 95 % | RESPIRATION RATE: 18 BRPM | HEART RATE: 60 BPM | BODY MASS INDEX: 31.5 KG/M2 | WEIGHT: 220 LBS

## 2020-07-09 DIAGNOSIS — I48.0 PAROXYSMAL ATRIAL FIBRILLATION (HCC): ICD-10-CM

## 2020-07-09 DIAGNOSIS — R06.09 DYSPNEA ON EXERTION: ICD-10-CM

## 2020-07-09 DIAGNOSIS — R94.39 ABNORMAL NUCLEAR STRESS TEST: ICD-10-CM

## 2020-07-09 PROCEDURE — C1887 CATHETER, GUIDING: HCPCS | Performed by: INTERNAL MEDICINE

## 2020-07-09 PROCEDURE — 93458 L HRT ARTERY/VENTRICLE ANGIO: CPT | Performed by: INTERNAL MEDICINE

## 2020-07-09 PROCEDURE — 93571 IV DOP VEL&/PRESS C FLO 1ST: CPT | Performed by: INTERNAL MEDICINE

## 2020-07-09 PROCEDURE — 99152 MOD SED SAME PHYS/QHP 5/>YRS: CPT | Performed by: INTERNAL MEDICINE

## 2020-07-09 PROCEDURE — C1894 INTRO/SHEATH, NON-LASER: HCPCS | Performed by: INTERNAL MEDICINE

## 2020-07-09 PROCEDURE — C1769 GUIDE WIRE: HCPCS | Performed by: INTERNAL MEDICINE

## 2020-07-09 PROCEDURE — 25010000002 MIDAZOLAM PER 1 MG: Performed by: INTERNAL MEDICINE

## 2020-07-09 PROCEDURE — 85347 COAGULATION TIME ACTIVATED: CPT

## 2020-07-09 PROCEDURE — 0 IOPAMIDOL PER 1 ML: Performed by: INTERNAL MEDICINE

## 2020-07-09 PROCEDURE — 25010000002 FENTANYL CITRATE (PF) 100 MCG/2ML SOLUTION: Performed by: INTERNAL MEDICINE

## 2020-07-09 PROCEDURE — 99153 MOD SED SAME PHYS/QHP EA: CPT | Performed by: INTERNAL MEDICINE

## 2020-07-09 PROCEDURE — S0260 H&P FOR SURGERY: HCPCS | Performed by: INTERNAL MEDICINE

## 2020-07-09 PROCEDURE — 25010000002 BH (CUPID ONLY) ADENOSINE 6 MG/100ML MIXTURE: Performed by: INTERNAL MEDICINE

## 2020-07-09 PROCEDURE — 93463 DRUG ADMIN & HEMODYNMIC MEAS: CPT | Performed by: INTERNAL MEDICINE

## 2020-07-09 PROCEDURE — 25010000002 HEPARIN (PORCINE) PER 1000 UNITS: Performed by: INTERNAL MEDICINE

## 2020-07-09 RX ORDER — SODIUM CHLORIDE 0.9 % (FLUSH) 0.9 %
10 SYRINGE (ML) INJECTION AS NEEDED
Status: DISCONTINUED | OUTPATIENT
Start: 2020-07-09 | End: 2020-07-09 | Stop reason: HOSPADM

## 2020-07-09 RX ORDER — MIDAZOLAM HYDROCHLORIDE 1 MG/ML
INJECTION INTRAMUSCULAR; INTRAVENOUS AS NEEDED
Status: DISCONTINUED | OUTPATIENT
Start: 2020-07-09 | End: 2020-07-09 | Stop reason: HOSPADM

## 2020-07-09 RX ORDER — ACETAMINOPHEN 325 MG/1
650 TABLET ORAL EVERY 4 HOURS PRN
Status: DISCONTINUED | OUTPATIENT
Start: 2020-07-09 | End: 2020-07-09 | Stop reason: HOSPADM

## 2020-07-09 RX ORDER — SODIUM CHLORIDE 9 MG/ML
75 INJECTION, SOLUTION INTRAVENOUS CONTINUOUS
Status: DISCONTINUED | OUTPATIENT
Start: 2020-07-09 | End: 2020-07-09 | Stop reason: HOSPADM

## 2020-07-09 RX ORDER — SODIUM CHLORIDE 9 MG/ML
1 INJECTION, SOLUTION INTRAVENOUS CONTINUOUS
Status: DISCONTINUED | OUTPATIENT
Start: 2020-07-09 | End: 2020-07-09 | Stop reason: HOSPADM

## 2020-07-09 RX ORDER — HEPARIN SODIUM 1000 [USP'U]/ML
INJECTION, SOLUTION INTRAVENOUS; SUBCUTANEOUS AS NEEDED
Status: DISCONTINUED | OUTPATIENT
Start: 2020-07-09 | End: 2020-07-09 | Stop reason: HOSPADM

## 2020-07-09 RX ORDER — SODIUM CHLORIDE 9 MG/ML
INJECTION, SOLUTION INTRAVENOUS CONTINUOUS PRN
Status: COMPLETED | OUTPATIENT
Start: 2020-07-09 | End: 2020-07-09

## 2020-07-09 RX ORDER — FENTANYL CITRATE 50 UG/ML
INJECTION, SOLUTION INTRAMUSCULAR; INTRAVENOUS AS NEEDED
Status: DISCONTINUED | OUTPATIENT
Start: 2020-07-09 | End: 2020-07-09 | Stop reason: HOSPADM

## 2020-07-09 RX ORDER — LIDOCAINE HYDROCHLORIDE 20 MG/ML
INJECTION, SOLUTION INFILTRATION; PERINEURAL AS NEEDED
Status: DISCONTINUED | OUTPATIENT
Start: 2020-07-09 | End: 2020-07-09 | Stop reason: HOSPADM

## 2020-07-09 RX ORDER — SODIUM CHLORIDE 0.9 % (FLUSH) 0.9 %
3 SYRINGE (ML) INJECTION EVERY 12 HOURS SCHEDULED
Status: DISCONTINUED | OUTPATIENT
Start: 2020-07-09 | End: 2020-07-09 | Stop reason: HOSPADM

## 2020-07-09 RX ORDER — LIDOCAINE HYDROCHLORIDE 10 MG/ML
0.1 INJECTION, SOLUTION EPIDURAL; INFILTRATION; INTRACAUDAL; PERINEURAL ONCE AS NEEDED
Status: DISCONTINUED | OUTPATIENT
Start: 2020-07-09 | End: 2020-07-09 | Stop reason: HOSPADM

## 2020-07-09 RX ADMIN — SODIUM CHLORIDE 75 ML/HR: 9 INJECTION, SOLUTION INTRAVENOUS at 07:26

## 2020-07-09 NOTE — H&P
Cardiology H/P      Patient Care Team:  Rakesh Vieira MD as PCP - General (Family Medicine)  Kory Corrales MD as Consulting Physician (Cardiac Electrophysiology)  Kory Corrales MD as Consulting Physician (Cardiac Electrophysiology)    CHIEF COMPLAINT: abn stress and echo  HUA edemappm    HISTORY OF PRESENT ILLNESS:  80 y/o WM with abn stress and echo with anterolateral abn. Here for elective LHC for eval HUA and WMA. No acute complaints. S/p PPM previosuly, echo with EF preserved 63# with appical hypokinesis and moderate AS. All risks and benefits explains. preop assessment complete.     Past Medical History:   Diagnosis Date   • Atrial fibrillation (CMS/HCC)    • Benign essential HTN    • Pacemaker    • Sick sinus syndrome (CMS/HCC)      Past Surgical History:   Procedure Laterality Date   • CARDIAC ELECTROPHYSIOLOGY PROCEDURE N/A 5/22/2020    Procedure: PPM battery change. [facility] change out;  Surgeon: Kory Corrales MD;  Location: [facility] INVASIVE LOCATION;  Service: Cardiology;  Laterality: N/A;   • INSERT / REPLACE / REMOVE PACEMAKER      [facility]   • PACEMAKER REPLACEMENT  05/22/2020    [facility]    • SINUS SURGERY       Family History   Problem Relation Age of Onset   • Heart failure Mother    • Heart failure Sister    • Heart failure Brother      Social History     Tobacco Use   • Smoking status: Never Smoker   • Smokeless tobacco: Former User     Types: Chew   Substance Use Topics   • Alcohol use: Yes     Comment: occasional   • Drug use: No     Medications Prior to Admission   Medication Sig Dispense Refill Last Dose   • amLODIPine-benazepril (LOTREL) 10-20 MG per capsule Take 10-20 capsules by mouth Daily.   7/9/2020 at Unknown time   • aspirin 325 MG tablet Take 325 mg by mouth Daily.   7/8/2020 at Unknown time   • meloxicam (MOBIC) 15 MG tablet Take 15 mg by mouth Daily.   7/9/2020 at Unknown time   • rivaroxaban (XARELTO) 20 MG tablet Take 1 tablet by mouth Daily.  "Do not Resume Xarelto until May 26, 2020 90 tablet 3 7/6/2020     Allergies:  Codeine and Morphine    REVIEW OF SYSTEMS:  Please see the above history of present illness for pertinent positives and negatives.  The remainder of the patient's systems have been reviewed and are negative x14 point ROS    Vital Signs  Temp:  [98 °F (36.7 °C)] 98 °F (36.7 °C)  Heart Rate:  [62] 62  Resp:  [20] 20  BP: (167)/(77) 167/77    Flowsheet Rows      First Filed Value   Admission Height  177.8 cm (70\") Documented at 07/09/2020 0707   Admission Weight  99.8 kg (220 lb) Documented at 07/09/2020 0707           Physical Exam:  Physical Exam   Constitutional: Patient appears well-developed and well-nourished and in no acute distress   HEENT:   Head: Normocephalic and atraumatic.   Eyes:  Pupils are equal, round, and reactive to light. EOM are intact. Sclerae are anicteric and noninjected.  Mouth and Throat: Patient has moist mucous membranes. Oropharynx is clear of any erythema or exudate.     Neck: Neck supple. No JVD present. No thyromegaly present. No lymphadenopathy present.  Cardiovascular: Regular rate, regular rhythm, S1 normal and S2 normal.  Exam reveals no gallop and no friction rub.  No murmur heard.  Pulmonary/Chest: Lungs are clear to auscultation bilaterally. No respiratory distress. No wheezes. No rhonchi. No rales.   Abdominal: Soft. Bowel sounds are normal. No distension and no mass. There is no hepatosplenomegaly. There is no tenderness.   Musculoskeletal: Normal muscle tone  Extremities: No edema. Pulses are palpable in all 4 extremities.  Neurological: Patient is alert and oriented to person, place, and time. Cranial nerves II-XII are grossly intact with no focal deficits.  Skin: Skin is warm. No rash noted. Nails show no clubbing.  No cyanosis or erythema.     Results Review:    I reviewed the patient's new clinical results.  Lab Results (most recent)     None          Imaging Results (Most Recent)     None    "     reviewed    ECG/EMG Results (most recent)     None        reviewed    Assessment/Plan     HUA ab echo and stress  Proceed with C today for eval CAD as well as AS severity, will consider RHC for pulm pressures.          I discussed the patient's findings and my recommendations with patient and staff    Pardeep Melendez MD  07/09/20  09:01

## 2020-07-09 NOTE — DISCHARGE INSTRUCTIONS
Please resume your Xarelto 7/10/2020        [facility]  [place] [address]    Coronary Angiogram (Radial Approach) After Care    Refer to this sheet in the next few weeks. These instructions provide you with information on caring for yourself after your procedure. Your health care provider may also give you more specific instructions. Your treatment has been planned according to current medical practices, but problems sometimes occur. Call your health care provider if you have any problems or questions after your procedure.       Home Care Instructions:  · You may shower the day after the procedure. Remove the bandage (dressing) and gently wash the site with plain soap and water. Gently pat the site dry. You may apply a band aid daily for 2 days if desired.    · Do not apply powder or lotion to the site.  · Do not submerge the affected site in water for 3 to 5 days or until the site is completely healed.   · Do not flex or bend the affected arm for 24 hours.  · Do not lift, push or pull anything over 10 pounds for 2 days after your procedure.  · Do not operate machinery or power tools for 24 hours.  · Inspect the site at least twice daily. You may notice some bruising at the site and it may be tender for 1 to 2 weeks.     · Increase your fluid intake for the next 2 days.    · Keep arm elevated for 24 hours.  For the remainder of the day, keep your arm in the “Pledge of Allegiance” position when up and about.    · Limit your activity for the next 48 hours and avoid strenuous activity as directed by your physician.   · You may drive 24 hours after the procedure unless otherwise instructed by your caregiver.  · A responsible adult should be with you for the first 24 hours after you arrive home.   · Do not make any important legal decisions or sign legal papers for 24 hours. Do not drink alcohol for 24 hours.    · Take medicines only as directed by your health care provider.  Blood  thinners may be prescribed after your procedure to improve blood flow through the stent.    · Metformin or any medications containing Metformin should not be taken for 48 hours after your procedure.    · Eat a heart-healthy diet. This should include plenty of fresh fruits and vegetables. Meat should be lean cuts. Avoid the following types of food:    ¨ Food that is high in salt.    ¨ Canned or highly processed food.    ¨ Food that is high in saturated fat or sugar.    ¨ Fried food.    · Make any other lifestyle changes recommended by your health care provider. This may include:    ¨ Not using any tobacco products including cigarettes, chewing tobacco, or electronic cigarettes.   ¨ Managing your weight.    ¨ Getting regular exercise.    ¨ Managing your blood pressure.    ¨ Limiting your alcohol intake.    ¨ Managing other health problems, such as diabetes.    · Keep all follow-up visits as directed by your health care provider.    ·   Call Your Doctor If:  · You have unusual pain at the radial/ulnar (wrist) site.  · You have redness, warmth, swelling, or pain at the radial/ulnar (wrist) site.  · You have drainage (other than a small amount of blood on the dressing).  · `You have chills or a fever > 101.  · Your arm becomes pale or dark, cool, tingly, or numb.  · You develop chest pain, shortness of breath, feel faint or pass out.    · You have heavy bleeding from the site, hold pressure on the site for 20 minutes.  If the bleeding stops, apply a fresh bandage and call your cardiologist.  However, if you continue to have bleeding, call 911.        Make Sure You:   · Understand these instructions.  · Will watch your condition.  · Will get help right away if you are not doing well or get worse.

## 2020-07-14 LAB — ACT BLD: 208 SECONDS (ref 82–152)

## 2020-07-21 ENCOUNTER — OFFICE VISIT (OUTPATIENT)
Dept: CARDIOLOGY | Facility: CLINIC | Age: 80
End: 2020-07-21

## 2020-07-21 VITALS
RESPIRATION RATE: 18 BRPM | DIASTOLIC BLOOD PRESSURE: 80 MMHG | BODY MASS INDEX: 31.01 KG/M2 | WEIGHT: 216.6 LBS | SYSTOLIC BLOOD PRESSURE: 152 MMHG | HEART RATE: 60 BPM | HEIGHT: 70 IN

## 2020-07-21 DIAGNOSIS — I48.0 PAROXYSMAL ATRIAL FIBRILLATION (HCC): ICD-10-CM

## 2020-07-21 DIAGNOSIS — I20.8 STABLE ANGINA PECTORIS (HCC): Primary | ICD-10-CM

## 2020-07-21 DIAGNOSIS — I10 ESSENTIAL HYPERTENSION: ICD-10-CM

## 2020-07-21 DIAGNOSIS — I25.118 CORONARY ARTERY DISEASE OF NATIVE ARTERY OF NATIVE HEART WITH STABLE ANGINA PECTORIS (HCC): ICD-10-CM

## 2020-07-21 PROCEDURE — 99214 OFFICE O/P EST MOD 30 MIN: CPT | Performed by: INTERNAL MEDICINE

## 2020-07-22 PROBLEM — I25.118 CORONARY ARTERY DISEASE OF NATIVE ARTERY OF NATIVE HEART WITH STABLE ANGINA PECTORIS (HCC): Status: ACTIVE | Noted: 2020-07-22

## 2020-07-22 PROBLEM — I20.8 STABLE ANGINA PECTORIS (HCC): Status: ACTIVE | Noted: 2020-07-22

## 2020-07-22 RX ORDER — CARVEDILOL 3.12 MG/1
3.12 TABLET ORAL 2 TIMES DAILY
Qty: 180 TABLET | Refills: 1 | Status: SHIPPED | OUTPATIENT
Start: 2020-07-22 | End: 2020-10-13

## 2020-07-22 RX ORDER — ATORVASTATIN CALCIUM 10 MG/1
10 TABLET, FILM COATED ORAL DAILY
Qty: 30 TABLET | Refills: 5 | Status: SHIPPED | OUTPATIENT
Start: 2020-07-22 | End: 2020-10-13 | Stop reason: DRUGHIGH

## 2020-07-22 NOTE — PROGRESS NOTES
Cardiology clinic note  Pardeep Melendez MD, PhD  [facility] cardiology  Subjective:     Encounter Date:07/21/2020      Patient ID: Ronni Mauricio is a 79 y.o. male.    Chief Complaint:  Chief Complaint   Patient presents with   • Follow-up     Cath Follow up        HPI:  History of Present Illness  I had the pleasure to see this very pleasant 79-year-old gentleman in follow-up today from hospitalization and catheterization.  He has preserved LV systolic function with significant coronary disease and small vessel disease in his LAD as well as diagonal branch and septal perforators too small for intervention as compared to the size of my 5 [place] diagnostic catheter best treated with medications, lipid-lowering therapy antihypertensives and antianginals.  He also had an ostial PDA lesion of 80% with FFR value of 0.96 not meeting hemodynamic significance.  Otherwise he had nonobstructive disease in the proximal RCA proximal LAD and proximal circumflex.  He had preserved LV systolic function with EF of at least 55 to 60%.  He had an abnormal stress test in the apical lateral portion likely owing to small caliber distal LAD that did not reach the apex as well as small caliber diagonal branch.  We have chosen medical therapy and he has had no chest pain since discharge.  His blood pressure today is 152/80 which is been his typical blood pressure and his heart rate is paced at 60.  He has a pacemaker for tachybradycardia syndrome that is in place.  He is on amlodipine benazepril 10/20 daily as well as aspirin 325 daily and rivaroxaban 20 mg daily for stroke risk reduction with paroxysmal atrial fibrillation.  He has not been on beta-blockade but he has a backup pacemaker and we will elect to start Coreg 3.125 mg p.o. twice daily as well as atorvastatin 10 mg p.o. daily today with disease documented above.  He is amenable to this approach.  He is well compensated hemodynamically electrically stable and not volume  overloaded with no PND orthopnea heart failure signs or symptoms and no recurrent chest pain today.      Review of systems x14 point review of systems is negative except was mentioned above    Historical data copied for from previous encounters and EMR is unchanged    Left heart catheterization 7-9-2020  Physicians     Panel Physicians Referring Physician Case Authorizing Physician   Pardeep Melendez MD (Primary)  Deam, Kory Mcfadden MD   Indications     Dyspnea on exertion [R06.00 (ICD-10-CM)]   Abnormal nuclear stress test [R94.39 (ICD-10-CM)]   Paroxysmal atrial fibrillation (CMS/HCC) [I48.0 (ICD-10-CM)]       Conclusion      Pardeep Melendez MD, PhD  [facility] cardiology  Date of service 7-9-2020     Procedure  1.  Left heart catheterization with coronary angiography left ventriculography via right radial approach  2.  Fractional flow reserve of the distal RCA bifurcation into PDA with intracoronary adenosine for maximal hyperemia and trans-stenosis gradient assessment     After informed consent the patient was brought to the catheterization lab sterilely prepped and draped in usual fashion with exposure the right wrist for right radial access via micropuncture modified Seldinger technique with placement a 5/6 slender sheath to the radial artery under fluoroscopic guidance which was aspirated flushed with heparinized saline.  Standard cocktail was administered with heparin nitroglycerin and verapamil intra-arterially.  An 035 guidewire was advanced to the level aortic valve followed by JL 3 and JR4 diagnostic catheters for selective left and right coronary angiography respectively.  The JR4 was used across the aortic valve followed by exchange for a exchange length J-wire and placement of a Becker pigtail catheter across the aortic valve for transaortic valve pressure gradient assessment given history of moderate aortic stenosis and subjective shortness of air and dyspnea on  exertion.  After trans-valvular gradient assessment as well as EDP assessment hand-injection of 8 cc of contrast was used for left-ventricular [place] in FLORES position.  There was a pullback revealed no significant drift and all catheters and equipment were ultimately removed.  With borderline obstructive CAD at the ostial PDA decision was made for FFR given abnormal stress on this location with diminished counts in the inferior inferolateral wall.  There was MIRNA-3 flow throughout the RCA preprocedurally.  After heparinization with 70 units/kg of heparin for ACT greater than 275 a 6 Austrian JR4 guide was used to engage the ostium of the RCA followed by standard FFR with equalization proximally and pressure gradient assessment and fractional flow reserve with intracoronary 130 mcg of adenosine injected followed by FFR measurement.  Lowest FFR value of 0.96 not hemodynamically significant.  The wire was then pulled back showing no significant drift and then the catheter and wire were ultimately removed and the sheath flushed with heparinized saline.  Patient tolerated the procedure well with no complications.  He left the Cath Lab chest pain-free hemodynamically electrically stable alert talking to staff neurologically grossly intact.  TR band was placed in the Cath Lab with immediate hemostasis     Complications none  Blood loss less than 10 cc  Contrast used is less than 120 cc  Moderate conscious sedation time of 1 hour  Conscious sedation was utilized with IV Versed and fentanyl administered by registered nurse with continuous ECG pulse oximeter hematuric monitor throughout the entirety the case supervised by myself     Findings  1.  Opening aortic pressure 152/76  2.  Closing pressure unchanged  3.  LV pressure 140/9 with an EDP of 15 mmHg  4.  Trans-aortic valve gradient 12 to 14 mmHg peak to peak gradient  5.  Preserved LV systolic function EF is 60%     Angiography  1.  Left main is a medium large caliber  vessel giving rise to LAD and nondominant circumflex.  There is distal left main 30% narrowing only  2.  The LAD is a medium caliber vessel with proximal and mid diffuse luminal irregularities nothing greater than 30%.  The distal LAD essentially has dual LAD physiology with bifurcation into a continuation LAD which does not reach the apex and gives off septal perforators as well as immediate takeoff of a bifurcating diagonal branch that courses to the apical lateral and anterolateral wall.  There is no obstructive disease of this bifurcating distal diagonal branch.  The continuation LAD is small in size likely 2 mm or less with proximal 70% disease but again very small caliber portion of the vessel likely best treated medically given small caliber and MIRNA-3 flow.  The first diagonal branch has ostial 30% narrowing only and only diffuse luminal regularities after and then bifurcates distally with no obstructive disease after the proximal portion.  MIRNA-3 flow throughout the LAD distribution  3.  The circumflex is a medium caliber nondominant vessel with continuation circumflex, and small obtuse marginal branches with limited course along the lateral wall.  There is no obstructive disease in the circumflex distribution greater than 30%.  4.  The RCA is a large-caliber dominant vessel with ostial 30% narrowing.  The proximal mid and distal portion have diffuse luminal irregularities nothing greater than 30%.  The distal RCA at the bifurcation narrows to approximately 60% followed by takeoff of a medium caliber large PLV branch which trifurcates distally and perfuses the inferolateral wall but has MIRNA-3 flow.  The ostium of this PLV branch is widely patent with less than 30% narrowing.  The ostium of the PDA branch has 70 perhaps 80% stenosis and no obstructive disease thereafter.  FFR of the distal RCA into PDA branch representing the tightest stenosis was hemodynamically insignificant at 0.96 best treated medically  per guidelines     Insignificant FFR value 0.96 of the distal RCA into RPDA branch.     Conclusions and recommendations     Abnormal stress location was in the inferior and inferolateral wall however FFR is insignificant and there is no obstructive disease from the RCA into PLV branch.  Abnormal stress location of the apical lateral portion likely owing to small caliber distal LAD which is again best treated medically given small size and low likelihood of long-term patency with small caliber stent in this location as well as possible complicated placement noting trifurcation of this portion of the LAD..  Preserved LV systolic function  Slightly high LVEDP at 15 mmHg  No significant transaortic valve gradient, mild to moderate hemodynamic aortic stenosis maximally by [place] pigtail assessment of simultaneous transvalvular gradients.  Medical management with aspirin and statin, beta-blocker as tolerated with pacemaker in place for tachybradycardia syndrome with atrial sensed ventricular paced rhythm presently with history of paroxysmal atrial fibrillation  Diet and exercise per AHA guidelines  Restart anticoagulation tomorrow is okay  Restrictions discussed with respect to lifting with right arm status post radial access  Follow-up in clinic in 2 to 4 weeks with cardiology     It is a pleasure to be involved in his cardiovascular care.  Please call with any questions or concerns  Pardeep Melendez MD, PhD          The following portions of the patient's history were reviewed and updated as appropriate: allergies, current medications, past family history, past medical history, past social history, past surgical history and problem list.    Problem List:  Patient Active Problem List   Diagnosis   • Benign essential HTN   • Sick sinus syndrome (CMS/HCC)   • Presence of cardiac pacemaker   • Heart murmur   • Aortic stenosis, moderate   • Diastolic dysfunction   • Dyspnea on exertion   • Abnormal nuclear stress test   •  Paroxysmal atrial fibrillation (CMS/HCC)       Past Medical History:  Past Medical History:   Diagnosis Date   • Atrial fibrillation (CMS/HCC)    • Benign essential HTN    • Pacemaker    • Sick sinus syndrome (CMS/HCC)        Past Surgical History:  Past Surgical History:   Procedure Laterality Date   • CARDIAC CATHETERIZATION N/A 7/9/2020    Procedure: Left Heart Cath;  Surgeon: Pardeep Melendez MD;  [place] [facility] [place];  Service: Cardiovascular;  Laterality: N/A;   • CARDIAC CATHETERIZATION N/A 7/9/2020    Procedure: Coronary angiography;  Surgeon: aPrdeep Melendez MD;  [facility];  Service: Cardiovascular;  Laterality: N/A;   • CARDIAC CATHETERIZATION N/A 7/9/2020    Procedure: Left ventriculography;  Surgeon: Pardeep Melendez MD;  [facility];  Service: Cardiovascular;  Laterality: N/A;   • CARDIAC CATHETERIZATION  7/9/2020    Procedure: Functional Flow [place];  Surgeon: Pardeep Melendez MD;  [place] [facility] [facility] [facility];  Service: Cardiovascular;;   • CARDIAC ELECTROPHYSIOLOGY PROCEDURE N/A 5/22/2020    Procedure: PPM battery change. [facility] change out;  Surgeon: Kory Corrales MD;  [place] [facility] [place] [facility] [place];  Service: Cardiology;  Laterality: N/A;   • INSERT / REPLACE / REMOVE PACEMAKER      [facility]   • PACEMAKER REPLACEMENT  05/22/2020    [facility]    • SINUS SURGERY         Social History:  Social History     Socioeconomic History   • Marital status:      Spouse name: Not on file   • Number of children: Not on file   • Years of education: Not on file   • Highest education level: Not on file   Tobacco Use   • Smoking status: Never Smoker   • Smokeless tobacco: Former User     Types: Chew   Substance and Sexual Activity   • Alcohol use: Yes     Comment: occasional   • Drug use: No   • Sexual activity: Defer       Allergies:  Allergies   Allergen Reactions  "  • Codeine Itching   • Morphine Itching       Immunizations:    There is no immunization history on file for this patient.    ROS:  ROS       Objective:         /80 (BP Location: Left arm, Patient Position: Sitting)   Pulse 60   Resp 18   Ht 177.8 cm (70\")   Wt 98.2 kg (216 lb 9.6 oz)   BMI 31.08 kg/m²     Physical Exam  No acute distress alert and oriented x3 afebrile vital signs stable  Regular rate rhythm no rubs murmurs or gallops  Clear to auscultation bilaterally  Soft nontender nondistended bowel sounds positive  No clubbing cyanosis or edema  Extremities warm and dry no clubbing cyanosis or edema  Neurologically grossly intact bilaterally  Pacemaker site clean dry  Neurologically grossly intact bilaterally  Normal mood and affect today  Normocephalic atraumatic no JVD  In-Office Procedure(s):  Procedures    ASCVD RIsk Score::  The ASCVD Risk score (Ronal AVINA Jr., et al., 2013) failed to calculate for the following reasons:    The 2013 ASCVD risk score is only valid for ages 40 to 79    Recent Radiology:  Imaging Results (Most Recent)     None          Lab Review:   Admission on 07/09/2020, Discharged on 07/09/2020   Component Date Value   • Activated Clotting Time  07/09/2020 208*   Lab on 07/07/2020   Component Date Value   • COVID19 07/07/2020 Not Detected    Lab on 07/07/2020   Component Date Value   • Glucose 07/07/2020 101*   • BUN 07/07/2020 18    • Creatinine 07/07/2020 1.27    • Sodium 07/07/2020 138    • Potassium 07/07/2020 4.5    • Chloride 07/07/2020 103    • CO2 07/07/2020 26.5    • Calcium 07/07/2020 9.6    • Total Protein 07/07/2020 7.3    • Albumin 07/07/2020 4.30    • ALT (SGPT) 07/07/2020 12    • AST (SGOT) 07/07/2020 17    • Alkaline Phosphatase 07/07/2020 65    • Total Bilirubin 07/07/2020 0.5    • eGFR Non African Amer 07/07/2020 54*   • Globulin 07/07/2020 3.0    • A/G Ratio 07/07/2020 1.4    • BUN/Creatinine Ratio 07/07/2020 14.2    • Anion Gap 07/07/2020 8.5    • Protime " 07/07/2020 16.7*   • INR 07/07/2020 1.38*   • WBC 07/07/2020 10.36    • RBC 07/07/2020 4.59    • Hemoglobin 07/07/2020 12.6*   • Hematocrit 07/07/2020 39.1    • MCV 07/07/2020 85.2    • MCH 07/07/2020 27.5    • MCHC 07/07/2020 32.2    • RDW 07/07/2020 12.5    • RDW-SD 07/07/2020 38.2    • MPV 07/07/2020 10.0    • Platelets 07/07/2020 383    • Neutrophil % 07/07/2020 61.7    • Lymphocyte % 07/07/2020 24.8    • Monocyte % 07/07/2020 9.0    • Eosinophil % 07/07/2020 2.4    • Basophil % 07/07/2020 1.4    • Immature Grans % 07/07/2020 0.7*   • Neutrophils, Absolute 07/07/2020 6.40    • Lymphocytes, Absolute 07/07/2020 2.57    • Monocytes, Absolute 07/07/2020 0.93*   • Eosinophils, Absolute 07/07/2020 0.25    • Basophils, Absolute 07/07/2020 0.14    • Immature Grans, Absolute 07/07/2020 0.07*   • nRBC 07/07/2020 0.0    Hospital Outpatient Visit on 06/22/2020   Component Date Value   • BSA 06/22/2020 2.2    • IVSd 06/22/2020 1.3    • LVIDd 06/22/2020 4.7    • LVIDs 06/22/2020 3.7    • LVPWd 06/22/2020 1.3    • IVS/LVPW 06/22/2020 0.99    • FS 06/22/2020 20.9    • EDV(Teich) 06/22/2020 100.1    • ESV(Teich) 06/22/2020 57.4    • EF(Teich) 06/22/2020 42.7    • EDV(cubed) 06/22/2020 100.8    • ESV(cubed) 06/22/2020 49.8    • EF(cubed) 06/22/2020 50.6    • LV mass(C)d 06/22/2020 228.2    • LV mass(C)dI 06/22/2020 105.0    • SV(Teich) 06/22/2020 42.7    • SI(Teich) 06/22/2020 19.6    • SV(cubed) 06/22/2020 51.0    • SI(cubed) 06/22/2020 23.5    • Ao root diam 06/22/2020 2.7    • Ao root area 06/22/2020 5.9    • ACS 06/22/2020 1.4    • LVOT diam 06/22/2020 2.0    • LVOT area 06/22/2020 3.3    • LVOT area(traced) 06/22/2020 3.1    • LVLd ap4 06/22/2020 8.1    • EDV(MOD-sp4) 06/22/2020 97.0    • LVLs ap4 06/22/2020 6.9    • ESV(MOD-sp4) 06/22/2020 43.0    • EF(MOD-sp4) 06/22/2020 55.7    • LVLd ap2 06/22/2020 8.3    • EDV(MOD-sp2) 06/22/2020 80.0    • LVLs ap2 06/22/2020 7.1    • ESV(MOD-sp2) 06/22/2020 44.0    • EF(MOD-sp2)  06/22/2020 45.0    • SV(MOD-sp4) 06/22/2020 54.0    • SI(MOD-sp4) 06/22/2020 24.8    • SV(MOD-sp2) 06/22/2020 36.0    • SI(MOD-sp2) 06/22/2020 16.6    • Ao root area (BSA correc* 06/22/2020 1.3    • LV Dupree Vol (BSA correct* 06/22/2020 44.6    • LV Sys Vol (BSA correcte* 06/22/2020 19.8    • EF(MOD-bp) 06/22/2020 50.7    • MV A dur 06/22/2020 0.16    • MV E max jude 06/22/2020 67.8    • MV A max jude 06/22/2020 109.4    • MV E/A 06/22/2020 0.62    • MV V2 max 06/22/2020 124.2    • MV max PG 06/22/2020 6.2    • MV V2 mean 06/22/2020 61.2    • MV mean PG 06/22/2020 1.8    • MV V2 VTI 06/22/2020 39.0    • MVA(VTI) 06/22/2020 2.0    • MV P1/2t max jude 06/22/2020 90.3    • MV P1/2t 06/22/2020 99.3    • MVA(P1/2t) 06/22/2020 2.2    • MV dec slope 06/22/2020 266.2    • MV dec time 06/22/2020 363    • Ao pk jude 06/22/2020 229.4    • Ao max PG 06/22/2020 21.0    • Ao max PG (full) 06/22/2020 17.7    • Ao V2 mean 06/22/2020 167.4    • Ao mean PG 06/22/2020 12.5    • Ao mean PG (full) 06/22/2020 10.8    • Ao V2 VTI 06/22/2020 55.4    • JÚNIOR(I,A) 06/22/2020 1.4    • JÚNIOR(I,D) 06/22/2020 1.4    • JÚNIOR(V,A) 06/22/2020 1.3    • JÚNIOR(V,D) 06/22/2020 1.3    • LV V1 max PG 06/22/2020 3.3    • LV V1 mean PG 06/22/2020 1.8    • LV V1 max 06/22/2020 91.3    • LV V1 mean 06/22/2020 61.5    • LV V1 VTI 06/22/2020 24.0    • SV(Ao) 06/22/2020 328.7    • SI(Ao) 06/22/2020 151.2    • SV(LVOT) 06/22/2020 78.2    • SI(LVOT) 06/22/2020 36.0    • PA V2 max 06/22/2020 131.7    • PA max PG 06/22/2020 6.9    • PA max PG (full) 06/22/2020 4.1    • PA acc time 06/22/2020 0.11    • RV V1 max PG 06/22/2020 2.9    • RV V1 mean PG 06/22/2020 1.1    • RV V1 max 06/22/2020 84.8    • RV V1 mean 06/22/2020 45.8    • RV V1 VTI 06/22/2020 13.9    • TR max jude 06/22/2020 215.8    • RVSP(TR) 06/22/2020 21.6    • RAP systole 06/22/2020 3.0    • PA pr(Accel) 06/22/2020 31.5    • Pulm Sys Jude 06/22/2020 68.6    • Pulm Dupree Jude 06/22/2020 39.0    • Pulm S/D 06/22/2020 1.8     • Pulm A Revs Dur 06/22/2020 0.11    • Pulm A Revs Jude 06/22/2020 21.4    • MVA P1/2T LCG 06/22/2020 2.4    • Lat Peak E' Jude 06/22/2020 5.3    • Med Peak E' Jude 06/22/2020 6.3    •  CV ECHO EBONY - BZI_BMI 06/22/2020 31.6    •  CV ECHO EBONY - BSA(HA* 06/22/2020 2.2    •  CV ECHO EBONY - BZI_ME* 06/22/2020 99.8    •  CV ECHO EBONY - BZI_ME* 06/22/2020 177.8    • Avg E/e' ratio 06/22/2020 11.69    • Target HR (85%) 06/22/2020 118    • Max. Pred. HR (100%) 06/22/2020 139    •  CV VAS BP RIGHT ARM 06/22/2020 156/81    • RV S' 06/22/2020 14.00    • LA Volume Index 06/22/2020 28.0    • TAPSE (>1.6) 06/22/2020 2.70    Hospital Outpatient Visit on 06/22/2020   Component Date Value   •  CV STRESS PROTOCOL 1 06/22/2020 Pharmacologic    • Stage 1 06/22/2020 1    • Duration Min Stage 1 06/22/2020 0    • Duration Sec Stage 1 06/22/2020 10    • Stress Dose Regadenoson * 06/22/2020 0.4    • Stress Comments Stage 1 06/22/2020 10 sec bolus injection    • Baseline HR 06/22/2020 60    • Baseline BP 06/22/2020 169/80    • Peak HR 06/22/2020 76    • Percent Max Pred HR 06/22/2020 54.68    • Percent Target HR 06/22/2020 64    • Peak BP 06/22/2020 169/80    • Recovery HR 06/22/2020 65    • Recovery BP 06/22/2020 156/79    • Target HR (85%) 06/22/2020 118    • Max. Pred. HR (100%) 06/22/2020 139    Lab on 05/20/2020   Component Date Value   • Glucose 05/20/2020 101*   • BUN 05/20/2020 21    • Creatinine 05/20/2020 1.37*   • Sodium 05/20/2020 135*   • Potassium 05/20/2020 4.5    • Chloride 05/20/2020 99    • CO2 05/20/2020 26.1    • Calcium 05/20/2020 9.6    • Total Protein 05/20/2020 7.3    • Albumin 05/20/2020 4.20    • ALT (SGPT) 05/20/2020 13    • AST (SGOT) 05/20/2020 19    • Alkaline Phosphatase 05/20/2020 65    • Total Bilirubin 05/20/2020 0.4    • eGFR Non African Amer 05/20/2020 50*   • Globulin 05/20/2020 3.1    • A/G Ratio 05/20/2020 1.4    • BUN/Creatinine Ratio 05/20/2020 15.3    • Anion Gap 05/20/2020 9.9    •  Protime 05/20/2020 18.5*   • INR 05/20/2020 1.58*   • WBC 05/20/2020 11.52*   • RBC 05/20/2020 4.35    • Hemoglobin 05/20/2020 12.8*   • Hematocrit 05/20/2020 38.1    • MCV 05/20/2020 87.6    • MCH 05/20/2020 29.4    • MCHC 05/20/2020 33.6    • RDW 05/20/2020 12.4    • RDW-SD 05/20/2020 39.4    • MPV 05/20/2020 9.6    • Platelets 05/20/2020 362    • Neutrophil % 05/20/2020 62.7    • Lymphocyte % 05/20/2020 25.1    • Monocyte % 05/20/2020 8.6    • Eosinophil % 05/20/2020 2.2    • Basophil % 05/20/2020 1.1    • Immature Grans % 05/20/2020 0.3    • Neutrophils, Absolute 05/20/2020 7.22*   • Lymphocytes, Absolute 05/20/2020 2.89    • Monocytes, Absolute 05/20/2020 0.99*   • Eosinophils, Absolute 05/20/2020 0.25    • Basophils, Absolute 05/20/2020 0.13    • Immature Grans, Absolute 05/20/2020 0.04    • nRBC 05/20/2020 0.0    Lab on 05/20/2020   Component Date Value   • COVID19 05/20/2020 Not Detected                 Assessment and plan:         Coronary artery disease  80% PDA with FFR 0.96  LAD distal disease 80% however very small vessel less than 1.5 mm in diameter distal to stenosis, medical management  Diagonal branch 70% small caliber distal to stenosis, medical management    Continue daily aspirin as well as Xarelto  Continue afterload reduction  Start beta-blocker Coreg 3.125 mg p.o. twice daily  Low-dose statin started today atorvastatin 10 daily, recheck fasting lipid panel as outpatient in 3 months    Paroxysmal atrial fibrillation  Xarelto, rate rhythm control strategy, dual-chamber pacemaker for tachybradycardia syndrome, has backup for starting Coreg twice daily with hypertension and coronary disease    Essential hypertension uncontrolled  Start Coreg 3.125 twice daily, continue amlodipine 10 and benazepril 20    Return to clinic in 90 days to recheck blood pressure, reassess symptoms titrate medicines    Multiple cardiac comorbidities addressed individually today  Greater than 25-minute spent face-to-face  with the patient with greater 50% time spent in education and discussion of his cath films and demonstration of cath images today.  As well as additional time and charting after our encounter                       Pardeep Melendez MD  07/22/20  .

## 2020-08-05 ENCOUNTER — TELEPHONE (OUTPATIENT)
Dept: CARDIOLOGY | Facility: CLINIC | Age: 80
End: 2020-08-05

## 2020-08-05 DIAGNOSIS — I10 ESSENTIAL HYPERTENSION: Primary | ICD-10-CM

## 2020-08-05 NOTE — TELEPHONE ENCOUNTER
Pt states when he saw Dr Melendez 7/21/20 he thought he mentioned starting a water pill.  I reviewed notes from office visit. I do not see any mention/discussion about this. He did get the two new prescriptions for Coreg & Lipitor which pt has rec'd from pharmacy.  He is asking to run this by Dr Melendez to be sure he is not to be on a diuretic?  Pt understands Dr Melendez is out of office until Mon 8/10/20. RTRN

## 2020-08-13 NOTE — TELEPHONE ENCOUNTER
Per dr bueno start chlorthalidone 12.5mg daily and have BMP in one week. Left vm for patient to return my call.

## 2020-08-14 RX ORDER — CHLORTHALIDONE 25 MG/1
12.5 TABLET ORAL DAILY
Qty: 45 TABLET | Refills: 1 | Status: SHIPPED | OUTPATIENT
Start: 2020-08-14 | End: 2020-11-12

## 2020-08-14 NOTE — TELEPHONE ENCOUNTER
Spoke with patient regarding medication and lab work. He will call back a week after he starts chlorthalidone to let me know where to send the order for lab work in [place].

## 2020-08-19 ENCOUNTER — RESULTS ENCOUNTER (OUTPATIENT)
Dept: CARDIOLOGY | Facility: CLINIC | Age: 80
End: 2020-08-19

## 2020-08-19 DIAGNOSIS — I10 ESSENTIAL HYPERTENSION: ICD-10-CM

## 2020-08-26 ENCOUNTER — TELEPHONE (OUTPATIENT)
Dept: CARDIOLOGY | Facility: CLINIC | Age: 80
End: 2020-08-26

## 2020-08-29 LAB
BUN SERPL-MCNC: 28 MG/DL (ref 8–27)
BUN/CREAT SERPL: 22 (ref 10–24)
CALCIUM SERPL-MCNC: 9.5 MG/DL (ref 8.6–10.2)
CHLORIDE SERPL-SCNC: 100 MMOL/L (ref 96–106)
CO2 SERPL-SCNC: 28 MMOL/L (ref 20–29)
CREAT SERPL-MCNC: 1.28 MG/DL (ref 0.76–1.27)
GLUCOSE SERPL-MCNC: 100 MG/DL (ref 65–99)
POTASSIUM SERPL-SCNC: 4.8 MMOL/L (ref 3.5–5.2)
SODIUM SERPL-SCNC: 140 MMOL/L (ref 134–144)

## 2020-09-04 ENCOUNTER — TELEPHONE (OUTPATIENT)
Dept: CARDIOLOGY | Facility: CLINIC | Age: 80
End: 2020-09-04

## 2020-09-28 ENCOUNTER — TELEPHONE (OUTPATIENT)
Dept: CARDIOLOGY | Facility: CLINIC | Age: 80
End: 2020-09-28

## 2020-09-28 NOTE — TELEPHONE ENCOUNTER
Patient called today stating hes been having some neck, shoulder, and back pain, shortness of breath, and pressure on chest mostly on exertion but does have some of these symptoms daily but mild. He thinks this has occurred since he started Coreg 2 months ago and cant tolerate any longer. He started chlorthalidone 12.5mg daily on 8/14.     HR 60-65   9/28 am 119/65   9/27 455pm 129/62  9/27 am 143/73  9/26 6pm 141/62  9/26 am 118/64

## 2020-10-13 ENCOUNTER — OFFICE VISIT (OUTPATIENT)
Dept: CARDIOLOGY | Facility: CLINIC | Age: 80
End: 2020-10-13

## 2020-10-13 VITALS
RESPIRATION RATE: 18 BRPM | HEART RATE: 70 BPM | BODY MASS INDEX: 31.09 KG/M2 | WEIGHT: 217.2 LBS | SYSTOLIC BLOOD PRESSURE: 148 MMHG | DIASTOLIC BLOOD PRESSURE: 68 MMHG | HEIGHT: 70 IN

## 2020-10-13 DIAGNOSIS — I10 ESSENTIAL HYPERTENSION: Primary | ICD-10-CM

## 2020-10-13 DIAGNOSIS — Z95.0 PRESENCE OF CARDIAC PACEMAKER: ICD-10-CM

## 2020-10-13 DIAGNOSIS — R94.39 ABNORMAL NUCLEAR STRESS TEST: ICD-10-CM

## 2020-10-13 DIAGNOSIS — I49.5 SICK SINUS SYNDROME (HCC): ICD-10-CM

## 2020-10-13 DIAGNOSIS — R01.1 HEART MURMUR: ICD-10-CM

## 2020-10-13 DIAGNOSIS — I51.89 DIASTOLIC DYSFUNCTION: ICD-10-CM

## 2020-10-13 PROCEDURE — 99214 OFFICE O/P EST MOD 30 MIN: CPT | Performed by: INTERNAL MEDICINE

## 2020-10-13 RX ORDER — ATORVASTATIN CALCIUM 20 MG/1
20 TABLET, FILM COATED ORAL DAILY
Qty: 90 TABLET | Refills: 1 | Status: SHIPPED | OUTPATIENT
Start: 2020-10-13 | End: 2021-05-06 | Stop reason: SDUPTHER

## 2020-10-13 RX ORDER — METOPROLOL SUCCINATE 25 MG/1
25 TABLET, EXTENDED RELEASE ORAL DAILY
Qty: 30 TABLET | Refills: 1 | Status: SHIPPED | OUTPATIENT
Start: 2020-10-13 | End: 2020-10-13 | Stop reason: SDUPTHER

## 2020-10-13 RX ORDER — ISOSORBIDE MONONITRATE 30 MG/1
30 TABLET, EXTENDED RELEASE ORAL DAILY
Qty: 90 TABLET | Refills: 1 | Status: SHIPPED | OUTPATIENT
Start: 2020-10-13 | End: 2021-05-06 | Stop reason: SDUPTHER

## 2020-10-13 RX ORDER — METOPROLOL SUCCINATE 25 MG/1
25 TABLET, EXTENDED RELEASE ORAL DAILY
Qty: 90 TABLET | Refills: 1 | Status: SHIPPED | OUTPATIENT
Start: 2020-10-13 | End: 2021-03-23

## 2020-10-13 RX ORDER — ATORVASTATIN CALCIUM 20 MG/1
20 TABLET, FILM COATED ORAL DAILY
Qty: 30 TABLET | Refills: 1 | Status: SHIPPED | OUTPATIENT
Start: 2020-10-13 | End: 2020-10-13 | Stop reason: SDUPTHER

## 2020-10-13 RX ORDER — ISOSORBIDE MONONITRATE 30 MG/1
30 TABLET, EXTENDED RELEASE ORAL DAILY
Qty: 30 TABLET | Refills: 1 | Status: SHIPPED | OUTPATIENT
Start: 2020-10-13 | End: 2020-10-13 | Stop reason: SDUPTHER

## 2020-10-14 NOTE — PROGRESS NOTES
Cardiology clinic note  Pardeep Melendez MD, PhD  Subjective:     Encounter Date:10/13/2020      Patient ID: Ronni Mauricio is a 80 y.o. male.    Chief Complaint:  Chief Complaint   Patient presents with   • Chest Pain   • Shortness of Breath       HPI:  History of Present Illness  I had the pleasure to see this very pleasant 79-year-old gentleman in follow-up today from hospitalization and catheterization.  He has preserved LV systolic function with significant coronary disease and small vessel disease in his LAD as well as diagonal branch and septal perforators too small for intervention as compared to the size of my 5 Sudanese diagnostic catheter best treated with medications, lipid-lowering therapy antihypertensives and antianginals.  He also had an ostial PDA lesion of 80% with FFR value of 0.96 not meeting hemodynamic significance.  Otherwise he had nonobstructive disease in the proximal RCA proximal LAD and proximal circumflex.  He had preserved LV systolic function with EF of at least 55 to 60%.  He had an abnormal stress test in the apical lateral portion likely owing to small caliber distal LAD that did not reach the apex as well as small caliber diagonal branch.  We have chosen medical therapy and he has had no chest pain since discharge.  His blood pressure today is 152/80 which is been his typical blood pressure and his heart rate is paced at 60.  He has a pacemaker for tachybradycardia syndrome that is in place.  He is on amlodipine benazepril 10/20 daily as well as aspirin 325 daily and rivaroxaban 20 mg daily for stroke risk reduction with paroxysmal atrial fibrillation.  He has not been on beta-blockade but he has a backup pacemaker and we previously started Coreg 3.125 mg p.o. twice daily however he says he began to have chest pain and discontinue this medication.  However exertional chest pain continued even after discontinuation of this medicine.  He is fine at rest but says with exertion or 1  flight of stairs he gets very short of breath and gets chest heaviness which is progressed to chest pain.  We reviewed his catheterization films again today which shows small vessels peripherally most specifically in the LAD territory.  His blood pressure today is 148/68 and his heart rate is 70 we did discuss additional antianginals such as isosorbide mononitrate 30 daily which we will try.  Additionally will change Coreg to 25 of metoprolol XL daily as more beta-1 selective.  His weight is stable at 216 pounds and he is well compensated with no edema no heart failure        Review of systems x14 point review of systems is negative except was mentioned above     Historical data copied for from previous encounters and EMR is unchanged     Left heart catheterization 7-9-2020  Physicians      Panel Physicians Referring Physician Case Authorizing Physician   Pardeep Melendez MD (Primary)   Deam, Kory Mcfadden MD   Indications           Dyspnea on exertion [R06.00 (ICD-10-CM)]   Abnormal nuclear stress test [R94.39 (ICD-10-CM)]   Paroxysmal atrial fibrillation (CMS/HCC) [I48.0 (ICD-10-CM)]          Conclusion       Pardeep Melendez MD, PhD  [facility] cardiology  Date of service 7-9-2020     Procedure  1.  Left heart catheterization with coronary angiography left ventriculography via right radial approach  2.  Fractional flow reserve of the distal RCA bifurcation into PDA with intracoronary adenosine for maximal hyperemia and trans-stenosis gradient assessment     After informed consent the patient was brought to the catheterization lab sterilely prepped and draped in usual fashion with exposure the right wrist for right radial access via micropuncture modified Seldinger technique with placement a 5/6 slender sheath to the radial artery under fluoroscopic guidance which was aspirated flushed with heparinized saline.  Standard cocktail was administered with heparin nitroglycerin and verapamil  intra-arterially.  An 035 guidewire was advanced to the level aortic valve followed by JL 3 and JR4 diagnostic catheters for selective left and right coronary angiography respectively.  The JR4 was used across the aortic valve followed by exchange for a exchange length J-wire and placement of a Easton pigtail catheter across the aortic valve for transaortic valve pressure gradient assessment given history of moderate aortic stenosis and subjective shortness of air and dyspnea on exertion.  After trans-valvular gradient assessment as well as EDP assessment hand-injection of 8 cc of contrast was used for left-ventricular Griffey in FLORES position.  There was a pullback revealed no significant drift and all catheters and equipment were ultimately removed.  With borderline obstructive CAD at the ostial PDA decision was made for FFR given abnormal stress on this location with diminished counts in the inferior inferolateral wall.  There was MIRNA-3 flow throughout the RCA preprocedurally.  After heparinization with 70 units/kg of heparin for ACT greater than 275 a 6 [place] JR4 guide was used to engage the ostium of the RCA followed by standard FFR with equalization proximally and pressure gradient assessment and fractional flow reserve with intracoronary 130 mcg of adenosine injected followed by FFR measurement.  Lowest FFR value of 0.96 not hemodynamically significant.  The wire was then pulled back showing no significant drift and then the catheter and wire were ultimately removed and the sheath flushed with heparinized saline.  Patient tolerated the procedure well with no complications.  He left the Cath Lab chest pain-free hemodynamically electrically stable alert talking to staff neurologically grossly intact.  TR band was placed in the Cath Lab with immediate hemostasis     Complications none  Blood loss less than 10 cc  Contrast used is less than 120 cc  Moderate conscious sedation time of 1 hour  Conscious sedation  was utilized with IV Versed and fentanyl administered by registered nurse with continuous ECG pulse oximeter hematuric monitor throughout the entirety the case supervised by myself     Findings  1.  Opening aortic pressure 152/76  2.  Closing pressure unchanged  3.  LV pressure 140/9 with an EDP of 15 mmHg  4.  Trans-aortic valve gradient 12 to 14 mmHg peak to peak gradient  5.  Preserved LV systolic function EF is 60%     Angiography  1.  Left main is a medium large caliber vessel giving rise to LAD and nondominant circumflex.  There is distal left main 30% narrowing only  2.  The LAD is a medium caliber vessel with proximal and mid diffuse luminal irregularities nothing greater than 30%.  The distal LAD essentially has dual LAD physiology with bifurcation into a continuation LAD which does not reach the apex and gives off septal perforators as well as immediate takeoff of a bifurcating diagonal branch that courses to the apical lateral and anterolateral wall.  There is no obstructive disease of this bifurcating distal diagonal branch.  The continuation LAD is small in size likely 2 mm or less with proximal 70% disease but again very small caliber portion of the vessel likely best treated medically given small caliber and MIRNA-3 flow.  The first diagonal branch has ostial 30% narrowing only and only diffuse luminal regularities after and then bifurcates distally with no obstructive disease after the proximal portion.  MIRNA-3 flow throughout the LAD distribution  3.  The circumflex is a medium caliber nondominant vessel with continuation circumflex, and small obtuse marginal branches with limited course along the lateral wall.  There is no obstructive disease in the circumflex distribution greater than 30%.  4.  The RCA is a large-caliber dominant vessel with ostial 30% narrowing.  The proximal mid and distal portion have diffuse luminal irregularities nothing greater than 30%.  The distal RCA at the bifurcation  narrows to approximately 60% followed by takeoff of a medium caliber large PLV branch which trifurcates distally and perfuses the inferolateral wall but has MIRNA-3 flow.  The ostium of this PLV branch is widely patent with less than 30% narrowing.  The ostium of the PDA branch has 70 perhaps 80% stenosis and no obstructive disease thereafter.  FFR of the distal RCA into PDA branch representing the tightest stenosis was hemodynamically insignificant at 0.96 best treated medically per guidelines     Insignificant FFR value 0.96 of the distal RCA into RPDA branch.     Conclusions and recommendations     Abnormal stress location was in the inferior and inferolateral wall however FFR is insignificant and there is no obstructive disease from the RCA into PLV branch.  Abnormal stress location of the apical lateral portion likely owing to small caliber distal LAD which is again best treated medically given small size and low likelihood of long-term patency with small caliber stent in this location as well as possible complicated placement noting trifurcation of this portion of the LAD..  Preserved LV systolic function  Slightly high LVEDP at 15 mmHg  No significant transaortic valve gradient, mild to moderate hemodynamic aortic stenosis maximally by Ryan pigtail assessment of simultaneous transvalvular gradients.  Medical management with aspirin and statin, beta-blocker as tolerated with pacemaker in place for tachybradycardia syndrome with atrial sensed ventricular paced rhythm presently with history of paroxysmal atrial fibrillation  Diet and exercise per AHA guidelines  Restart anticoagulation tomorrow is okay  Restrictions discussed with respect to lifting with right arm status post radial access  Follow-up in clinic in 2 to 4 weeks with cardiology     It is a pleasure to be involved in his cardiovascular care.  Please call with any questions or concerns  Pardeep Melendez MD, PhD          The following portions of the  patient's history were reviewed and updated as appropriate: allergies, current medications, past family history, past medical history, past social history, past surgical history and problem list.    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)   • Stable angina pectoris (CMS/HCC)       Past Medical History:  Past Medical History:   Diagnosis Date   • Atrial fibrillation (CMS/HCC)    • Benign essential HTN    • Pacemaker    • Sick sinus syndrome (CMS/HCC)        Past Surgical History:  Past Surgical History:   Procedure Laterality Date   • CARDIAC CATHETERIZATION N/A 7/9/2020    Procedure: Left Heart Cath;  Surgeon: Pardeep Melendez MD;  Location: [facility] CATH INVASIVE LOCATION;  Service: Cardiovascular;  Laterality: N/A;   • CARDIAC CATHETERIZATION N/A 7/9/2020    Procedure: Coronary angiography;  Surgeon: Pardeep Melendez MD;  Location: [facility] CATH INVASIVE LOC[facility];  Service: Cardiovascular;  Laterality: N/A;   • CARDIAC CATHETERIZATION N/A 7/9/2020    Procedure: Left ventriculography;  Surgeon: Pardeep Melendez MD;  [facility] C[facility];  Service: Cardiovascular;  Laterality: N/A;   • CARDIAC CATHETERIZATION  7/9/2020    Procedure: Functional Flow [place];  Surgeon: Pardeep Melendez MD;  Location: [facility] CATH INVASIVE LOCATION;  Service: Cardiovascular;;   • CARDIAC ELECTROPHYSIOLOGY PROCEDURE N/A 5/22/2020    Procedure: PPM battery change. [facility] change out;  Surgeon: Kory Corrales MD;  Location:  [facility] CATH INVASIVE LOCATION;  Service: Cardiology;  Laterality: N/A;   • INSERT /  "REPLACE / REMOVE PACEMAKER      [facility]   • PACEMAKER REPLACEMENT  05/22/2020    [facility]    • SINUS SURGERY         Social History:  Social History     Socioeconomic History   • Marital status:      Spouse name: Not on file   • Number of children: Not on file   • Years of education: Not on file   • Highest education level: Not on file   Tobacco Use   • Smoking status: Never Smoker   • Smokeless tobacco: Former User     Types: Chew   Substance and Sexual Activity   • Alcohol use: Yes     Comment: occasional   • Drug use: No   • Sexual activity: Defer       Allergies:  Allergies   Allergen Reactions   • Codeine Itching   • Morphine Itching       Immunizations:    There is no immunization history on file for this patient.    ROS:  ROS       Objective:         /68 (BP Location: Left arm, Patient Position: Sitting)   Pulse 70   Resp 18   Ht 177.8 cm (70\")   Wt 98.5 kg (217 lb 3.2 oz)   BMI 31.16 kg/m²     Physical Exam  No acute distress alert and oriented x3 afebrile vital signs stable  Regular rate rhythm no rubs murmurs or gallops  Clear to auscultation bilaterally  Soft nontender nondistended bowel sounds positive  No clubbing cyanosis or edema  Extremities warm and dry no clubbing cyanosis or edema  Neurologically grossly intact bilaterally  Pacemaker site clean dry  Neurologically grossly intact bilaterally  Normal mood and affect today  Normocephalic atraumatic no JVD  Essentially unchanged from prior encounter In-Office Procedure(s):  Procedures    ASCVD RIsk Score::  The ASCVD Risk score (Ronal FABRICE Jr., et al., 2013) failed to calculate for the following reasons:    The 2013 ASCVD risk score is only valid for ages 40 to 79    Recent Radiology:  Imaging Results (Most Recent)     None          Lab Review:   Results Encounter on 08/19/2020   Component Date Value   • Glucose 08/28/2020 100*   • BUN 08/28/2020 28*   • Creatinine 08/28/2020 1.28*   • eGFR Non African Am 08/28/2020 52*   • eGFR "  Am 08/28/2020 60    • BUN/Creatinine Ratio 08/28/2020 22    • Sodium 08/28/2020 140    • Potassium 08/28/2020 4.8    • Chloride 08/28/2020 100    • Total CO2 08/28/2020 28    • Calcium 08/28/2020 9.5    Admission on 07/09/2020, Discharged on 07/09/2020   Component Date Value   • Activated Clotting Time  07/09/2020 208*   Lab on 07/07/2020   Component Date Value   • COVID19 07/07/2020 Not Detected    Lab on 07/07/2020   Component Date Value   • Glucose 07/07/2020 101*   • BUN 07/07/2020 18    • Creatinine 07/07/2020 1.27    • Sodium 07/07/2020 138    • Potassium 07/07/2020 4.5    • Chloride 07/07/2020 103    • CO2 07/07/2020 26.5    • Calcium 07/07/2020 9.6    • Total Protein 07/07/2020 7.3    • Albumin 07/07/2020 4.30    • ALT (SGPT) 07/07/2020 12    • AST (SGOT) 07/07/2020 17    • Alkaline Phosphatase 07/07/2020 65    • Total Bilirubin 07/07/2020 0.5    • eGFR Non African Amer 07/07/2020 54*   • Globulin 07/07/2020 3.0    • A/G Ratio 07/07/2020 1.4    • BUN/Creatinine Ratio 07/07/2020 14.2    • Anion Gap 07/07/2020 8.5    • Protime 07/07/2020 16.7*   • INR 07/07/2020 1.38*   • WBC 07/07/2020 10.36    • RBC 07/07/2020 4.59    • Hemoglobin 07/07/2020 12.6*   • Hematocrit 07/07/2020 39.1    • MCV 07/07/2020 85.2    • MCH 07/07/2020 27.5    • MCHC 07/07/2020 32.2    • RDW 07/07/2020 12.5    • RDW-SD 07/07/2020 38.2    • MPV 07/07/2020 10.0    • Platelets 07/07/2020 383    • Neutrophil % 07/07/2020 61.7    • Lymphocyte % 07/07/2020 24.8    • Monocyte % 07/07/2020 9.0    • Eosinophil % 07/07/2020 2.4    • Basophil % 07/07/2020 1.4    • Immature Grans % 07/07/2020 0.7*   • Neutrophils, Absolute 07/07/2020 6.40    • Lymphocytes, Absolute 07/07/2020 2.57    • Monocytes, Absolute 07/07/2020 0.93*   • Eosinophils, Absolute 07/07/2020 0.25    • Basophils, Absolute 07/07/2020 0.14    • Immature Grans, Absolute 07/07/2020 0.07*   • nRBC 07/07/2020 0.0    Hospital Outpatient Visit on 06/22/2020   Component Date Value   •  BSA 06/22/2020 2.2    • IVSd 06/22/2020 1.3    • LVIDd 06/22/2020 4.7    • LVIDs 06/22/2020 3.7    • LVPWd 06/22/2020 1.3    • IVS/LVPW 06/22/2020 0.99    • FS 06/22/2020 20.9    • EDV(Teich) 06/22/2020 100.1    • ESV(Teich) 06/22/2020 57.4    • EF(Teich) 06/22/2020 42.7    • EDV(cubed) 06/22/2020 100.8    • ESV(cubed) 06/22/2020 49.8    • EF(cubed) 06/22/2020 50.6    • LV mass(C)d 06/22/2020 228.2    • LV mass(C)dI 06/22/2020 105.0    • SV(Teich) 06/22/2020 42.7    • SI(Teich) 06/22/2020 19.6    • SV(cubed) 06/22/2020 51.0    • SI(cubed) 06/22/2020 23.5    • Ao root diam 06/22/2020 2.7    • Ao root area 06/22/2020 5.9    • ACS 06/22/2020 1.4    • LVOT diam 06/22/2020 2.0    • LVOT area 06/22/2020 3.3    • LVOT area(traced) 06/22/2020 3.1    • LVLd ap4 06/22/2020 8.1    • EDV(MOD-sp4) 06/22/2020 97.0    • LVLs ap4 06/22/2020 6.9    • ESV(MOD-sp4) 06/22/2020 43.0    • EF(MOD-sp4) 06/22/2020 55.7    • LVLd ap2 06/22/2020 8.3    • EDV(MOD-sp2) 06/22/2020 80.0    • LVLs ap2 06/22/2020 7.1    • ESV(MOD-sp2) 06/22/2020 44.0    • EF(MOD-sp2) 06/22/2020 45.0    • SV(MOD-sp4) 06/22/2020 54.0    • SI(MOD-sp4) 06/22/2020 24.8    • SV(MOD-sp2) 06/22/2020 36.0    • SI(MOD-sp2) 06/22/2020 16.6    • Ao root area (BSA correc* 06/22/2020 1.3    • LV Dupree Vol (BSA correct* 06/22/2020 44.6    • LV Sys Vol (BSA correcte* 06/22/2020 19.8    • EF(MOD-bp) 06/22/2020 50.7    • MV A dur 06/22/2020 0.16    • MV E max richmond 06/22/2020 67.8    • MV A max richmond 06/22/2020 109.4    • MV E/A 06/22/2020 0.62    • MV V2 max 06/22/2020 124.2    • MV max PG 06/22/2020 6.2    • MV V2 mean 06/22/2020 61.2    • MV mean PG 06/22/2020 1.8    • MV V2 VTI 06/22/2020 39.0    • MVA(VTI) 06/22/2020 2.0    • MV P1/2t max richmond 06/22/2020 90.3    • MV P1/2t 06/22/2020 99.3    • MVA(P1/2t) 06/22/2020 2.2    • MV dec slope 06/22/2020 266.2    • MV dec time 06/22/2020 363    • Ao pk richmond 06/22/2020 229.4    • Ao max PG 06/22/2020 21.0    • Ao max PG (full) 06/22/2020 17.7     • Ao V2 mean 06/22/2020 167.4    • Ao mean PG 06/22/2020 12.5    • Ao mean PG (full) 06/22/2020 10.8    • Ao V2 VTI 06/22/2020 55.4    • JÚNIOR(I,A) 06/22/2020 1.4    • JÚNIOR(I,D) 06/22/2020 1.4    • JÚNIOR(V,A) 06/22/2020 1.3    • JÚNIOR(V,D) 06/22/2020 1.3    • LV V1 max PG 06/22/2020 3.3    • LV V1 mean PG 06/22/2020 1.8    • LV V1 max 06/22/2020 91.3    • LV V1 mean 06/22/2020 61.5    • LV V1 VTI 06/22/2020 24.0    • SV(Ao) 06/22/2020 328.7    • SI(Ao) 06/22/2020 151.2    • SV(LVOT) 06/22/2020 78.2    • SI(LVOT) 06/22/2020 36.0    • PA V2 max 06/22/2020 131.7    • PA max PG 06/22/2020 6.9    • PA max PG (full) 06/22/2020 4.1    • PA acc time 06/22/2020 0.11    • RV V1 max PG 06/22/2020 2.9    • RV V1 mean PG 06/22/2020 1.1    • RV V1 max 06/22/2020 84.8    • RV V1 mean 06/22/2020 45.8    • RV V1 VTI 06/22/2020 13.9    • TR max jude 06/22/2020 215.8    • RVSP(TR) 06/22/2020 21.6    • RAP systole 06/22/2020 3.0    • PA pr(Accel) 06/22/2020 31.5    • Pulm Sys Jude 06/22/2020 68.6    • Pulm Dupree Jude 06/22/2020 39.0    • Pulm S/D 06/22/2020 1.8    • Pulm A Revs Dur 06/22/2020 0.11    • Pulm A Revs Jude 06/22/2020 21.4    • MVA P1/2T LCG 06/22/2020 2.4    • Lat Peak E' Jude 06/22/2020 5.3    • Med Peak E' Jude 06/22/2020 6.3    •  CV ECHO EBONY - BZI_BMI 06/22/2020 31.6    •  CV ECHO EBONY - BSA(HA* 06/22/2020 2.2    •  CV ECHO EBONY - BZI_ME* 06/22/2020 99.8    •  CV ECHO EBONY - BZI_ME* 06/22/2020 177.8    • Avg E/e' ratio 06/22/2020 11.69    • Target HR (85%) 06/22/2020 118    • Max. Pred. HR (100%) 06/22/2020 139    •  CV VAS BP RIGHT ARM 06/22/2020 156/81    • RV S' 06/22/2020 14.00    • LA Volume Index 06/22/2020 28.0    • TAPSE (>1.6) 06/22/2020 2.70    Hospital Outpatient Visit on 06/22/2020   Component Date Value   •  CV STRESS PROTOCOL 1 06/22/2020 Pharmacologic    • Stage 1 06/22/2020 1    • Duration Min Stage 1 06/22/2020 0    • Duration Sec Stage 1 06/22/2020 10    • Stress Dose Regadenoson * 06/22/2020 0.4     • Stress Comments Stage 1 06/22/2020 10 sec bolus injection    • Baseline HR 06/22/2020 60    • Baseline BP 06/22/2020 169/80    • Peak HR 06/22/2020 76    • Percent Max Pred HR 06/22/2020 54.68    • Percent Target HR 06/22/2020 64    • Peak BP 06/22/2020 169/80    • Recovery HR 06/22/2020 65    • Recovery BP 06/22/2020 156/79    • Target HR (85%) 06/22/2020 118    • Max. Pred. HR (100%) 06/22/2020 139    Lab on 05/20/2020   Component Date Value   • Glucose 05/20/2020 101*   • BUN 05/20/2020 21    • Creatinine 05/20/2020 1.37*   • Sodium 05/20/2020 135*   • Potassium 05/20/2020 4.5    • Chloride 05/20/2020 99    • CO2 05/20/2020 26.1    • Calcium 05/20/2020 9.6    • Total Protein 05/20/2020 7.3    • Albumin 05/20/2020 4.20    • ALT (SGPT) 05/20/2020 13    • AST (SGOT) 05/20/2020 19    • Alkaline Phosphatase 05/20/2020 65    • Total Bilirubin 05/20/2020 0.4    • eGFR Non African Amer 05/20/2020 50*   • Globulin 05/20/2020 3.1    • A/G Ratio 05/20/2020 1.4    • BUN/Creatinine Ratio 05/20/2020 15.3    • Anion Gap 05/20/2020 9.9    • Protime 05/20/2020 18.5*   • INR 05/20/2020 1.58*   • WBC 05/20/2020 11.52*   • RBC 05/20/2020 4.35    • Hemoglobin 05/20/2020 12.8*   • Hematocrit 05/20/2020 38.1    • MCV 05/20/2020 87.6    • MCH 05/20/2020 29.4    • MCHC 05/20/2020 33.6    • RDW 05/20/2020 12.4    • RDW-SD 05/20/2020 39.4    • MPV 05/20/2020 9.6    • Platelets 05/20/2020 362    • Neutrophil % 05/20/2020 62.7    • Lymphocyte % 05/20/2020 25.1    • Monocyte % 05/20/2020 8.6    • Eosinophil % 05/20/2020 2.2    • Basophil % 05/20/2020 1.1    • Immature Grans % 05/20/2020 0.3    • Neutrophils, Absolute 05/20/2020 7.22*   • Lymphocytes, Absolute 05/20/2020 2.89    • Monocytes, Absolute 05/20/2020 0.99*   • Eosinophils, Absolute 05/20/2020 0.25    • Basophils, Absolute 05/20/2020 0.13    • Immature Grans, Absolute 05/20/2020 0.04    • nRBC 05/20/2020 0.0    Lab on 05/20/2020   Component Date Value   • COVID19 05/20/2020 Not  Detected                 Assessment:          Diagnosis Plan   1. Essential hypertension     2. Sick sinus syndrome (CMS/HCC)     3. Presence of cardiac pacemaker     4. Heart murmur     5. Diastolic dysfunction     6. Abnormal nuclear stress test            Plan:        Coronary artery disease  80% PDA with FFR 0.96, nonobstructive  LAD distal disease 80% however very small vessel less than 2 mm in diameter distal to stenosis, medical management will be attempted prior to small vessel intervention possibly with Jayson stent  Diagonal branch 70% small caliber distal to stenosis, medical management     Continue daily aspirin as well as Xarelto  Continue afterload reduction  Start beta-blocker Toprol at 25 XL daily  Angina : we will start Imdur 30 daily  Low-dose statin started previously atorvastatin 10 daily, recheck fasting lipid panel as outpatient in 3 months     Paroxysmal atrial fibrillation  Xarelto, rate rhythm control strategy, dual-chamber pacemaker for tachybradycardia syndrome, has backup for starting Coreg twice daily with hypertension and coronary disease     Essential hypertension uncontrolled  Metoprolol and Imdur added     Return to clinic in 30 days to recheck blood pressure, reassess symptoms titrate medicines    Level of Care:                 Pardeep Melendez MD  10/14/20  .

## 2020-11-12 ENCOUNTER — OFFICE VISIT (OUTPATIENT)
Dept: CARDIOLOGY | Facility: CLINIC | Age: 80
End: 2020-11-12

## 2020-11-12 VITALS
HEART RATE: 60 BPM | HEIGHT: 70 IN | BODY MASS INDEX: 31.38 KG/M2 | DIASTOLIC BLOOD PRESSURE: 58 MMHG | SYSTOLIC BLOOD PRESSURE: 100 MMHG | RESPIRATION RATE: 18 BRPM | WEIGHT: 219.2 LBS

## 2020-11-12 DIAGNOSIS — I10 ESSENTIAL HYPERTENSION: Primary | ICD-10-CM

## 2020-11-12 PROCEDURE — 99214 OFFICE O/P EST MOD 30 MIN: CPT | Performed by: INTERNAL MEDICINE

## 2020-11-12 RX ORDER — ASPIRIN 81 MG/1
81 TABLET ORAL DAILY
Qty: 30 TABLET | Refills: 11 | Status: SHIPPED | OUTPATIENT
Start: 2020-11-12 | End: 2020-12-03

## 2020-11-12 RX ORDER — AMLODIPINE BESYLATE AND BENAZEPRIL HYDROCHLORIDE 5; 10 MG/1; MG/1
1 CAPSULE ORAL DAILY
Qty: 30 CAPSULE | Refills: 5 | Status: SHIPPED | OUTPATIENT
Start: 2020-11-12 | End: 2021-01-14 | Stop reason: DRUGHIGH

## 2020-11-12 RX ORDER — FUROSEMIDE 20 MG/1
20 TABLET ORAL 2 TIMES DAILY
Qty: 60 TABLET | Refills: 3 | Status: SHIPPED | OUTPATIENT
Start: 2020-11-12 | End: 2021-02-09 | Stop reason: SDUPTHER

## 2020-11-30 ENCOUNTER — TELEPHONE (OUTPATIENT)
Dept: CARDIOLOGY | Facility: CLINIC | Age: 80
End: 2020-11-30

## 2020-12-02 LAB
AMBIG ABBREV BMP8 DEFAULT: NORMAL
BUN SERPL-MCNC: 23 MG/DL (ref 8–27)
BUN/CREAT SERPL: 15 (ref 10–24)
CALCIUM SERPL-MCNC: 8.9 MG/DL (ref 8.6–10.2)
CHLORIDE SERPL-SCNC: 100 MMOL/L (ref 96–106)
CO2 SERPL-SCNC: 25 MMOL/L (ref 20–29)
CREAT SERPL-MCNC: 1.56 MG/DL (ref 0.76–1.27)
GLUCOSE SERPL-MCNC: 100 MG/DL (ref 65–99)
POTASSIUM SERPL-SCNC: 4.7 MMOL/L (ref 3.5–5.2)
SODIUM SERPL-SCNC: 142 MMOL/L (ref 134–144)

## 2020-12-03 ENCOUNTER — OFFICE VISIT (OUTPATIENT)
Dept: CARDIOLOGY | Facility: CLINIC | Age: 80
End: 2020-12-03

## 2020-12-03 VITALS
WEIGHT: 221.8 LBS | HEART RATE: 60 BPM | RESPIRATION RATE: 18 BRPM | SYSTOLIC BLOOD PRESSURE: 144 MMHG | BODY MASS INDEX: 31.75 KG/M2 | DIASTOLIC BLOOD PRESSURE: 60 MMHG | HEIGHT: 70 IN

## 2020-12-03 DIAGNOSIS — I10 ESSENTIAL HYPERTENSION: Primary | ICD-10-CM

## 2020-12-03 PROCEDURE — 99214 OFFICE O/P EST MOD 30 MIN: CPT | Performed by: INTERNAL MEDICINE

## 2020-12-03 RX ORDER — CLOPIDOGREL BISULFATE 75 MG/1
75 TABLET ORAL DAILY
Qty: 30 TABLET | Refills: 11 | Status: SHIPPED | OUTPATIENT
Start: 2020-12-03 | End: 2021-09-13

## 2020-12-03 RX ORDER — RANOLAZINE 500 MG/1
500 TABLET, EXTENDED RELEASE ORAL 2 TIMES DAILY
Qty: 60 TABLET | Refills: 3 | Status: SHIPPED | OUTPATIENT
Start: 2020-12-03 | End: 2021-04-01 | Stop reason: SDUPTHER

## 2020-12-04 NOTE — PROGRESS NOTES
Cardiology clinic note   Pardeep Melendez MD PhD  Subjective:     Encounter Date:12/03/2020      Patient ID: Ronni Mauricio is a 80 y.o. male.    Chief Complaint:  Chief Complaint   Patient presents with   • Follow-up       HPI:  History of Present Illness   previously I had the pleasure to see this very pleasant 79-year-old gentleman in follow-up today from hospitalization and catheterization.  He has preserved LV systolic function with significant coronary disease and small vessel disease in his LAD as well as diagonal branch and septal perforators too small for intervention as compared to the size of my 5 [place] diagnostic catheter best treated with medications, lipid-lowering therapy antihypertensives and antianginals.  He also had an ostial PDA lesion of 80% with FFR value of 0.96 not meeting hemodynamic significance.  Otherwise he had nonobstructive disease in the proximal RCA proximal LAD and proximal circumflex.  He had preserved LV systolic function with EF of at least 55 to 60%.  He had an abnormal stress test in the apical lateral portion likely owing to small caliber distal LAD that did not reach the apex as well as small caliber diagonal branch.  We have chosen medical therapy and he has had no chest pain since discharge.  His blood pressure today is 152/80 which is been his typical blood pressure and his heart rate is paced at 60.  He has a pacemaker for tachybradycardia syndrome that is in place.  He is on amlodipine benazepril 10/20 daily as well as aspirin 325 daily and rivaroxaban 20 mg daily for stroke risk reduction with paroxysmal atrial fibrillation.  He has not been on beta-blockade but he has a backup pacemaker and we previously started Coreg 3.125 mg p.o. twice daily however he says he began to have chest pain and discontinue this medication.  However exertional chest pain continued even after discontinuation of this medicine.  He is fine at rest but says with exertion or 1 flight of stairs  he gets very short of breath and gets chest heaviness which is progressed to chest pain.  We reviewed his catheterization films again today which shows small vessels peripherally most specifically in the LAD territory.  His blood pressure today is 148/68 and his heart rate is 70 we did discuss additional antianginals such as isosorbide mononitrate 30 daily which we will try.  Additionally will change Coreg to 25 of metoprolol XL daily as more beta-1 selective.  His weight is stable at 216 pounds and he is well compensated with no edema no heart failure     he presents back to clinic with edema much improved on Lasix 20 mg p.o. b.i.d. Labs reviewed with creatinine at 1.5 which is less than 20% increase but no increase in BUN or CO2 any does not appear dehydrated and volume depleted on exam today encounter.  He does remark that his shortness of breath sometimes improved but he is still having exertional chest discomfort with chronic stable angina.  We did discussed 3 options of continued medical therapy, stress testing to identify regional ischemia noting ostial PDA or distal RCA bifurcation disease, small vessel disease not amenable to intervention in the lateral wall, small caliber distal LAD with difficult anatomic course with very small vessel unlikely to be conducive long-term to stent patency.  He does not wish any procedures to be done and elected for medical therapy until after the 1st of the year to get through the holidays.  He will go to the hospital for any refractory chest pain and we will perform stenting of the distal RCA into the PDA as well as evaluate the LAD for possible small vessel PCI with  Jayson drug-eluting stent with aggressive post dilatation if able.      plan   Starting on antiplatelet therapy with Plavix today, stop aspirin  Continue Xarelto   Anti anginal therapy with Ranexa 500 mg p.o. b.i.d.  Continue long-acting nitrate  Back to clinic 2nd week of January for symptom  reassessment        Review of systems x14 point review of systems is negative except was mentioned above     Historical data copied for from previous encounters and EMR is unchanged     films from his heart catheterization lower reviewed again in person with the patient today with identification of distal RCA stenosis which was FFR negative previously as well as distal LAD with difficult anatomy conducive to long-term patency is small caliber stents.     Left heart catheterization 7-9-2020  Physicians      Panel Physicians Referring Physician Case Authorizing Physician   Pardeep Melendez MD (Primary)   DeamKory MD   Indications              Dyspnea on exertion [R06.00 (ICD-10-CM)]   Abnormal nuclear stress test [R94.39 (ICD-10-CM)]   Paroxysmal atrial fibrillation (CMS/HCC) [I48.0 (ICD-10-CM)]           Conclusion       Pardeep Melendez MD, PhD  [facility] cardiology  Date of service 7-9-2020     Procedure  1.  Left heart catheterization with coronary angiography left ventriculography via right radial approach  2.  Fractional flow reserve of the distal RCA bifurcation into PDA with intracoronary adenosine for maximal hyperemia and trans-stenosis gradient assessment     After informed consent the patient was brought to the catheterization lab sterilely prepped and draped in usual fashion with exposure the right wrist for right radial access via micropuncture modified Seldinger technique with placement a 5/6 slender sheath to the radial artery under fluoroscopic guidance which was aspirated flushed with heparinized saline.  Standard cocktail was administered with heparin nitroglycerin and verapamil intra-arterially.  An 035 guidewire was advanced to the level aortic valve followed by JL 3 and JR4 diagnostic catheters for selective left and right coronary angiography respectively.  The JR4 was used across the aortic valve followed by exchange for a exchange length J-wire and  placement of a [place] pigtail catheter across the aortic valve for transaortic valve pressure gradient assessment given history of moderate aortic stenosis and subjective shortness of air and dyspnea on exertion.  After trans-valvular gradient assessment as well as EDP assessment hand-injection of 8 cc of contrast was used for left-ventricular Gr[place]y in FLORES position.  There was a pullback revealed no significant drift and all catheters and equipment were ultimately removed.  With borderline obstructive CAD at the ostial PDA decision was made for FFR given abnormal stress on this location with diminished counts in the inferior inferolateral wall.  There was MIRNA-3 flow throughout the RCA preprocedurally.  After heparinization with 70 units/kg of heparin for ACT greater than 275 a 6 [place] JR4 guide was used to engage the ostium of the RCA followed by standard FFR with equalization proximally and pressure gradient assessment and fractional flow reserve with intracoronary 130 mcg of adenosine injected followed by FFR measurement.  Lowest FFR value of 0.96 not hemodynamically significant.  The wire was then pulled back showing no significant drift and then the catheter and wire were ultimately removed and the sheath flushed with heparinized saline.  Patient tolerated the procedure well with no complications.  He left the Cath Lab chest pain-free hemodynamically electrically stable alert talking to staff neurologically grossly intact.  TR band was placed in the Cath Lab with immediate hemostasis     Complications none  Blood loss less than 10 cc  Contrast used is less than 120 cc  Moderate conscious sedation time of 1 hour  Conscious sedation was utilized with IV Versed and fentanyl administered by registered nurse with continuous ECG pulse oximeter hematuric monitor throughout the entirety the case supervised by myself     Findings  1.  Opening aortic pressure 152/76  2.  Closing pressure unchanged  3.  LV pressure  140/9 with an EDP of 15 mmHg  4.  Trans-aortic valve gradient 12 to 14 mmHg peak to peak gradient  5.  Preserved LV systolic function EF is 60%     Angiography  1.  Left main is a medium large caliber vessel giving rise to LAD and nondominant circumflex.  There is distal left main 30% narrowing only  2.  The LAD is a medium caliber vessel with proximal and mid diffuse luminal irregularities nothing greater than 30%.  The distal LAD essentially has dual LAD physiology with bifurcation into a continuation LAD which does not reach the apex and gives off septal perforators as well as immediate takeoff of a bifurcating diagonal branch that courses to the apical lateral and anterolateral wall.  There is no obstructive disease of this bifurcating distal diagonal branch.  The continuation LAD is small in size likely 2 mm or less with proximal 70% disease but again very small caliber portion of the vessel likely best treated medically given small caliber and MIRNA-3 flow.  The first diagonal branch has ostial 30% narrowing only and only diffuse luminal regularities after and then bifurcates distally with no obstructive disease after the proximal portion.  MIRNA-3 flow throughout the LAD distribution  3.  The circumflex is a medium caliber nondominant vessel with continuation circumflex, and small obtuse marginal branches with limited course along the lateral wall.  There is no obstructive disease in the circumflex distribution greater than 30%.  4.  The RCA is a large-caliber dominant vessel with ostial 30% narrowing.  The proximal mid and distal portion have diffuse luminal irregularities nothing greater than 30%.  The distal RCA at the bifurcation narrows to approximately 60% followed by takeoff of a medium caliber large PLV branch which trifurcates distally and perfuses the inferolateral wall but has MIRNA-3 flow.  The ostium of this PLV branch is widely patent with less than 30% narrowing.  The ostium of the PDA branch has  70 perhaps 80% stenosis and no obstructive disease thereafter.  FFR of the distal RCA into PDA branch representing the tightest stenosis was hemodynamically insignificant at 0.96 best treated medically per guidelines     Insignificant FFR value 0.96 of the distal RCA into RPDA branch.     Conclusions and recommendations     Abnormal stress location was in the inferior and inferolateral wall however FFR is insignificant and there is no obstructive disease from the RCA into PLV branch.  Abnormal stress location of the apical lateral portion likely owing to small caliber distal LAD which is again best treated medically given small size and low likelihood of long-term patency with small caliber stent in this location as well as possible complicated placement noting trifurcation of this portion of the LAD..  Preserved LV systolic function  Slightly high LVEDP at 15 mmHg  No significant transaortic valve gradient, mild to moderate hemodynamic aortic stenosis maximally by [place] pigtail assessment of simultaneous transvalvular gradients.  Medical management with aspirin and statin, beta-blocker as tolerated with pacemaker in place for tachybradycardia syndrome with atrial sensed ventricular paced rhythm presently with history of paroxysmal atrial fibrillation  Diet and exercise per AHA guidelines  Restart anticoagulation tomorrow is okay  Restrictions discussed with respect to lifting with right arm status post radial access  Follow-up in clinic in 2 to 4 weeks with cardiology     It is a pleasure to be involved in his cardiovascular care.  Please call with any questions or concerns  Pardeep Melendez MD, PhD          The following portions of the patient's history were reviewed and updated as appropriate: allergies, current medications, past family history, past medical history, past social history, past surgical history and problem list.    Problem List:  Patient Active Problem List   Diagnosis   • Essential hypertension    • Sick sinus syndrome (CMS/HCC)   • Presence of cardiac pacemaker   • Heart murmur   • Aortic stenosis, moderate   • Diastolic dysfunction   • Dyspnea on exertion   • Abnormal nuclear stress test   • Paroxysmal atrial fibrillation (CMS/HCC)   • Coronary artery disease of native artery of native heart with stable angina pectoris (CMS/HCC)   • Stable angina pectoris (CMS/HCC)       Past Medical History:  Past Medical History:   Diagnosis Date   • Atrial fibrillation (CMS/HCC)    • Benign essential HTN    • Pacemaker    • Sick sinus syndrome (CMS/HCC)        Past Surgical History:  Past Surgical History:   Procedure Laterality Date   • CARDIAC CATHETERIZATION N/A 7/9/2020    Procedure: Left Heart Cath;  Surgeon: Pardeep Melendez MD;  [facility];  Service: Cardiovascular;  Laterality: N/A;   • CARDIAC CATHETERIZATION N/A 7/9/2020    Procedure: Coronary angiography;  Surgeon: Pardeep Melendez MD;  [facility] C[facility]AS[facility] LOC[facility];  Service: Cardiovascular;  Laterality: N/A;   • CARDIAC CATHETERIZATION N/A 7/9/2020    Procedure: Left ventriculography;  Surgeon: Pardeep Melendez MD;  Location: [facility] CATH INVASIVE LOCATION;  Service: Cardiovascular;  Laterality: N/A;   • CARDIAC CATHETERIZATION  7/9/2020    Procedure: Functional Flow [place];  Surgeon: Pardeep Melendez MD;  Location: [facility] CATH INVASIVE LOCATION;  Service: Cardiovascular;;   • CARDIAC ELECTROPHYSIOLOGY PROCEDURE N/A 5/22/2020    Procedure: PPM battery change. [facility] [place] change out;  Surgeon: Kory Corrales MD;  Location: [facility] CATH INVASIVE LOCATION;  Service: Cardiology;  Laterality: N/A;   • INSERT / REPLACE / REMOVE PACEMAKER      [facility]   • PACEMAKER REPLACEMENT  05/22/2020    [facility]    • SINUS SURGERY         Social History:  Social History     Socioeconomic History   • Marital status:      Spouse name: Not on file   • Number of children: Not on file   • Years  "of education: Not on file   • Highest education level: Not on file   Tobacco Use   • Smoking status: Never Smoker   • Smokeless tobacco: Former User     Types: Chew   Substance and Sexual Activity   • Alcohol use: Yes     Comment: occasional   • Drug use: No   • Sexual activity: Defer       Allergies:  Allergies   Allergen Reactions   • Codeine Itching   • Morphine Itching       Immunizations:    There is no immunization history on file for this patient.    ROS:  ROS       Objective:         /60 (BP Location: Left arm, Patient Position: Lying)   Pulse 60   Resp 18   Ht 177.8 cm (70\")   Wt 101 kg (221 lb 12.8 oz)   BMI 31.82 kg/m²     Physical Exam  No acute distress alert and oriented x3 afebrile vital signs stable  Regular rate rhythm no rubs murmurs or gallops  Clear to auscultation bilaterally  Soft nontender nondistended bowel sounds positive  No clubbing cyanosis or edema  Extremities warm and dry no clubbing cyanosis or edema  Neurologically grossly intact bilaterally  Pacemaker site clean dry  Neurologically grossly intact bilaterally  Normal mood and affect today  Normocephalic atraumatic no JVD  Essentially unchanged from prior encounter   In-Office Procedure(s):  Procedures    ASCVD RIsk Score::  The ASCVD Risk score (Shafter FABRICE Jr., et al., 2013) failed to calculate for the following reasons:    The 2013 ASCVD risk score is only valid for ages 40 to 79    Recent Radiology:  Imaging Results (Most Recent)     None          Lab Review:   Orders Only on 12/01/2020   Component Date Value   • Glucose 12/01/2020 100*   • BUN 12/01/2020 23    • Creatinine 12/01/2020 1.56*   • eGFR Non African Am 12/01/2020 41*   • eGFR  Am 12/01/2020 47*   • BUN/Creatinine Ratio 12/01/2020 15    • Sodium 12/01/2020 142    • Potassium 12/01/2020 4.7    • Chloride 12/01/2020 100    • Total CO2 12/01/2020 25    • Calcium 12/01/2020 8.9    • BMP 12/01/2020 Comment      Orders Only on 12/01/2020   Component Date Value "   • Glucose 12/01/2020 100*   • BUN 12/01/2020 23    • Creatinine 12/01/2020 1.56*   • eGFR Non African Am 12/01/2020 41*   • eGFR  Am 12/01/2020 47*   • BUN/Creatinine Ratio 12/01/2020 15    • Sodium 12/01/2020 142    • Potassium 12/01/2020 4.7    • Chloride 12/01/2020 100    • Total CO2 12/01/2020 25    • Calcium 12/01/2020 8.9    • BMP 12/01/2020 Comment    Results Encounter on 08/19/2020   Component Date Value   • Glucose 08/28/2020 100*   • BUN 08/28/2020 28*   • Creatinine 08/28/2020 1.28*   • eGFR Non African Am 08/28/2020 52*   • eGFR  Am 08/28/2020 60    • BUN/Creatinine Ratio 08/28/2020 22    • Sodium 08/28/2020 140    • Potassium 08/28/2020 4.8    • Chloride 08/28/2020 100    • Total CO2 08/28/2020 28    • Calcium 08/28/2020 9.5    Admission on 07/09/2020, Discharged on 07/09/2020   Component Date Value   • Activated Clotting Time  07/09/2020 208*   Lab on 07/07/2020   Component Date Value   • COVID19 07/07/2020 Not Detected    Lab on 07/07/2020   Component Date Value   • Glucose 07/07/2020 101*   • BUN 07/07/2020 18    • Creatinine 07/07/2020 1.27    • Sodium 07/07/2020 138    • Potassium 07/07/2020 4.5    • Chloride 07/07/2020 103    • CO2 07/07/2020 26.5    • Calcium 07/07/2020 9.6    • Total Protein 07/07/2020 7.3    • Albumin 07/07/2020 4.30    • ALT (SGPT) 07/07/2020 12    • AST (SGOT) 07/07/2020 17    • Alkaline Phosphatase 07/07/2020 65    • Total Bilirubin 07/07/2020 0.5    • eGFR Non African Amer 07/07/2020 54*   • Globulin 07/07/2020 3.0    • A/G Ratio 07/07/2020 1.4    • BUN/Creatinine Ratio 07/07/2020 14.2    • Anion Gap 07/07/2020 8.5    • Protime 07/07/2020 16.7*   • INR 07/07/2020 1.38*   • WBC 07/07/2020 10.36    • RBC 07/07/2020 4.59    • Hemoglobin 07/07/2020 12.6*   • Hematocrit 07/07/2020 39.1    • MCV 07/07/2020 85.2    • MCH 07/07/2020 27.5    • MCHC 07/07/2020 32.2    • RDW 07/07/2020 12.5    • RDW-SD 07/07/2020 38.2    • MPV 07/07/2020 10.0    • Platelets 07/07/2020  383    • Neutrophil % 07/07/2020 61.7    • Lymphocyte % 07/07/2020 24.8    • Monocyte % 07/07/2020 9.0    • Eosinophil % 07/07/2020 2.4    • Basophil % 07/07/2020 1.4    • Immature Grans % 07/07/2020 0.7*   • Neutrophils, Absolute 07/07/2020 6.40    • Lymphocytes, Absolute 07/07/2020 2.57    • Monocytes, Absolute 07/07/2020 0.93*   • Eosinophils, Absolute 07/07/2020 0.25    • Basophils, Absolute 07/07/2020 0.14    • Immature Grans, Absolute 07/07/2020 0.07*   • nRBC 07/07/2020 0.0    Hospital Outpatient Visit on 06/22/2020   Component Date Value   • BSA 06/22/2020 2.2    • IVSd 06/22/2020 1.3    • LVIDd 06/22/2020 4.7    • LVIDs 06/22/2020 3.7    • LVPWd 06/22/2020 1.3    • IVS/LVPW 06/22/2020 0.99    • FS 06/22/2020 20.9    • EDV(Teich) 06/22/2020 100.1    • ESV(Teich) 06/22/2020 57.4    • EF(Teich) 06/22/2020 42.7    • EDV(cubed) 06/22/2020 100.8    • ESV(cubed) 06/22/2020 49.8    • EF(cubed) 06/22/2020 50.6    • LV mass(C)d 06/22/2020 228.2    • LV mass(C)dI 06/22/2020 105.0    • SV(Teich) 06/22/2020 42.7    • SI(Teich) 06/22/2020 19.6    • SV(cubed) 06/22/2020 51.0    • SI(cubed) 06/22/2020 23.5    • Ao root diam 06/22/2020 2.7    • Ao root area 06/22/2020 5.9    • ACS 06/22/2020 1.4    • LVOT diam 06/22/2020 2.0    • LVOT area 06/22/2020 3.3    • LVOT area(traced) 06/22/2020 3.1    • LVLd ap4 06/22/2020 8.1    • EDV(MOD-sp4) 06/22/2020 97.0    • LVLs ap4 06/22/2020 6.9    • ESV(MOD-sp4) 06/22/2020 43.0    • EF(MOD-sp4) 06/22/2020 55.7    • LVLd ap2 06/22/2020 8.3    • EDV(MOD-sp2) 06/22/2020 80.0    • LVLs ap2 06/22/2020 7.1    • ESV(MOD-sp2) 06/22/2020 44.0    • EF(MOD-sp2) 06/22/2020 45.0    • SV(MOD-sp4) 06/22/2020 54.0    • SI(MOD-sp4) 06/22/2020 24.8    • SV(MOD-sp2) 06/22/2020 36.0    • SI(MOD-sp2) 06/22/2020 16.6    • Ao root area (BSA correc* 06/22/2020 1.3    • LV Dupree Vol (BSA correct* 06/22/2020 44.6    • LV Sys Vol (BSA correcte* 06/22/2020 19.8    • EF(MOD-bp) 06/22/2020 50.7    • MV A dur  06/22/2020 0.16    • MV E max jude 06/22/2020 67.8    • MV A max jude 06/22/2020 109.4    • MV E/A 06/22/2020 0.62    • MV V2 max 06/22/2020 124.2    • MV max PG 06/22/2020 6.2    • MV V2 mean 06/22/2020 61.2    • MV mean PG 06/22/2020 1.8    • MV V2 VTI 06/22/2020 39.0    • MVA(VTI) 06/22/2020 2.0    • MV P1/2t max jude 06/22/2020 90.3    • MV P1/2t 06/22/2020 99.3    • MVA(P1/2t) 06/22/2020 2.2    • MV dec slope 06/22/2020 266.2    • MV dec time 06/22/2020 363    • Ao pk jude 06/22/2020 229.4    • Ao max PG 06/22/2020 21.0    • Ao max PG (full) 06/22/2020 17.7    • Ao V2 mean 06/22/2020 167.4    • Ao mean PG 06/22/2020 12.5    • Ao mean PG (full) 06/22/2020 10.8    • Ao V2 VTI 06/22/2020 55.4    • JÚNIOR(I,A) 06/22/2020 1.4    • JÚNIOR(I,D) 06/22/2020 1.4    • JÚNIOR(V,A) 06/22/2020 1.3    • JÚNIOR(V,D) 06/22/2020 1.3    • LV V1 max PG 06/22/2020 3.3    • LV V1 mean PG 06/22/2020 1.8    • LV V1 max 06/22/2020 91.3    • LV V1 mean 06/22/2020 61.5    • LV V1 VTI 06/22/2020 24.0    • SV(Ao) 06/22/2020 328.7    • SI(Ao) 06/22/2020 151.2    • SV(LVOT) 06/22/2020 78.2    • SI(LVOT) 06/22/2020 36.0    • PA V2 max 06/22/2020 131.7    • PA max PG 06/22/2020 6.9    • PA max PG (full) 06/22/2020 4.1    • PA acc time 06/22/2020 0.11    • RV V1 max PG 06/22/2020 2.9    • RV V1 mean PG 06/22/2020 1.1    • RV V1 max 06/22/2020 84.8    • RV V1 mean 06/22/2020 45.8    • RV V1 VTI 06/22/2020 13.9    • TR max jude 06/22/2020 215.8    • RVSP(TR) 06/22/2020 21.6    • RAP systole 06/22/2020 3.0    • PA pr(Accel) 06/22/2020 31.5    • Pulm Sys Jude 06/22/2020 68.6    • Pulm Dupree Jude 06/22/2020 39.0    • Pulm S/D 06/22/2020 1.8    • Pulm A Revs Dur 06/22/2020 0.11    • Pulm A Revs Jude 06/22/2020 21.4    • MVA P1/2T LCG 06/22/2020 2.4    • Lat Peak E' Jude 06/22/2020 5.3    • Med Peak E' Jude 06/22/2020 6.3    • BH CV ECHO EBONY - BZI_BMI 06/22/2020 31.6    • BH CV ECHO EBONY - BSA(HA* 06/22/2020 2.2    • BH CV ECHO EBONY - BZI_ME* 06/22/2020 99.8    • BH CV  ECHO EBONY - BZI_ME* 06/22/2020 177.8    • Avg E/e' ratio 06/22/2020 11.69    • Target HR (85%) 06/22/2020 118    • Max. Pred. HR (100%) 06/22/2020 139    •  CV VAS BP RIGHT ARM 06/22/2020 156/81    • RV S' 06/22/2020 14.00    • LA Volume Index 06/22/2020 28.0    • TAPSE (>1.6) 06/22/2020 2.70    Hospital Outpatient Visit on 06/22/2020   Component Date Value   •  CV STRESS PROTOCOL 1 06/22/2020 Pharmacologic    • Stage 1 06/22/2020 1    • Duration Min Stage 1 06/22/2020 0    • Duration Sec Stage 1 06/22/2020 10    • Stress Dose Regadenoson * 06/22/2020 0.4    • Stress Comments Stage 1 06/22/2020 10 sec bolus injection    • Baseline HR 06/22/2020 60    • Baseline BP 06/22/2020 169/80    • Peak HR 06/22/2020 76    • Percent Max Pred HR 06/22/2020 54.68    • Percent Target HR 06/22/2020 64    • Peak BP 06/22/2020 169/80    • Recovery HR 06/22/2020 65    • Recovery BP 06/22/2020 156/79    • Target HR (85%) 06/22/2020 118    • Max. Pred. HR (100%) 06/22/2020 139                 Assessment:          Diagnosis Plan   1. Essential hypertension  Basic Metabolic Panel          Plan:      Coronary artery disease  80% PDA with FFR 0.96, nonobstructive  LAD distal disease 80% however very small vessel less than 2 mm in diameter distal to stenosis, medical management will be attempted prior to small vessel intervention possibly with [place] stent  Diagonal branch 70% small caliber distal to stenosis, medical management       Start Plavix, stop aspirin, continue Xarelto  Continue afterload reduction  Start beta-blocker Toprol at 25 XL daily  Angina : we will start Imdur 30 daily, continue   Ranexa 500 mg p.o. b.i.d.  Low-dose statin started previously atorvastatin 10 daily, titrate up as allowed by symptoms of myalgias      Paroxysmal atrial fibrillation  Xarelto, rate rhythm control strategy, dual-chamber pacemaker for tachybradycardia syndrome, has backup for starting Coreg twice daily with hypertension and coronary  disease     Essential hypertension uncontrolled  Metoprolol and Imdur added     Return to clinic in  4-5 weeks to recheck blood pressure, ,  titrate medicines, symptom reassessment     Level of Care:         Level of Care:       Pardeep Melendez MD PhD    Greater 25 minutes spent face-to-face with the patient today along with cath film review, recommendations, patient encounter additional time in charting           Pardeep Melendez MD  12/03/20  .

## 2020-12-07 ENCOUNTER — OFFICE VISIT (OUTPATIENT)
Dept: CARDIOLOGY | Facility: CLINIC | Age: 80
End: 2020-12-07

## 2020-12-07 DIAGNOSIS — Z95.0 PRESENCE OF CARDIAC PACEMAKER: ICD-10-CM

## 2020-12-07 DIAGNOSIS — I44.2 COMPLETE HEART BLOCK (HCC): Primary | ICD-10-CM

## 2020-12-07 DIAGNOSIS — I48.0 PAROXYSMAL ATRIAL FIBRILLATION (HCC): ICD-10-CM

## 2020-12-07 PROCEDURE — 99212 OFFICE O/P EST SF 10 MIN: CPT | Performed by: INTERNAL MEDICINE

## 2020-12-07 PROCEDURE — 93288 INTERROG EVL PM/LDLS PM IP: CPT | Performed by: INTERNAL MEDICINE

## 2020-12-07 RX ORDER — CARVEDILOL 3.12 MG/1
1 TABLET ORAL EVERY 12 HOURS SCHEDULED
COMMUNITY
End: 2021-03-22 | Stop reason: SDUPTHER

## 2020-12-07 NOTE — PROGRESS NOTES
Subjective:     Encounter Date:12/07/2020      Patient ID: Ronni Mauricio is a 80 y.o. male.    Chief Complaint:  Pacemaker followup    HPI:    Mr Mauricio is an 78 yo patient of Dr. Melendez with a past medical history significant for HTN and AV block who had a pacemaker implanted in 2009.  He also was noted to have some paroxysmal atrial fibrillation on remote monitoring.     He had an echo 11/19 which showed an EF of 45-50%, concentric LVH with grade 1 diastolic dysfunction, normal RV size and function, moderate AS without AR, MAC wtihout MS/MR.     Since he was last seen he has not had any complaints of palpitations, dizziness or syncope.      The following portions of the patient's history were reviewed and updated as appropriate: allergies, current medications, past family history, past medical history, past social history, past surgical history and problem list.    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)   • Stable angina pectoris (CMS/HCC)   • Complete heart block (CMS/HCC)       Past Medical History:  Past Medical History:   Diagnosis Date   • Atrial fibrillation (CMS/HCC)    • Benign essential HTN    • Pacemaker    • Sick sinus syndrome (CMS/HCC)        Past Surgical History:  Past Surgical History:   Procedure Laterality Date   • CARDIAC CATHETERIZATION N/A 7/9/2020    Procedure: Left Heart Cath;  Surgeon: Pardeep Melendez MD;  Location: [facility] INVASIVE LOCATION;  Service: Cardiovascular;  Laterality: N/A;   • CARDIAC CATHETERIZATION N/A 7/9/2020    Procedure: Coronary angiography;  Surgeon: Pardeep Melendez,  MD;  Location: [facility] CATH INVASIVE LOCATION;  Service: Cardiovascular;  Laterality: N/A;   • CARDIAC CATHETERIZATION N/A 7/9/2020    Procedure: Left ventriculography;  Surgeon: Pardeep Melendez MD;  Location: [facility] CATH INVASIVE LOCATION;  Service: Cardiovascular;  Laterality: N/A;   • CARDIAC CATHETERIZATION  7/9/2020    Procedure: Functional Flow [place];  Surgeon: Pardeep Melendez MD;  Location: [facility] CATH INVASIVE LOCATION;  Service: Cardiovascular;;   • CARDIAC ELECTROPHYSIOLOGY PROCEDURE N/A 5/22/2020    Procedure: PPM battery change. [facility] change out;  Surgeon: Kory Corrales MD;  Location: [facility] CATH INVASIVE LOCATION;  Service: Cardiology;  Laterality: N/A;   • INSERT / REPLACE / REMOVE PACEMAKER      [facility]   • PACEMAKER REPLACEMENT  05/22/2020    [facility]    • SINUS SURGERY         Social History:  Social History     Socioeconomic History   • Marital status:      Spouse name: Not on file   • Number of children: Not on file   • Years of education: Not on file   • Highest education level: Not on file   Tobacco Use   • Smoking status: Never Smoker   • Smokeless tobacco: Former User     Types: Chew   Substance and Sexual Activity   • Alcohol use: Yes     Comment: occasional   • Drug use: No   • Sexual activity: Defer       Allergies:  Allergies   Allergen Reactions   • Codeine Itching   • Morphine Itching       Immunizations:    There is no immunization history on file for this patient.    ROS:  Review of Systems   Constitution: Negative for malaise/fatigue.   Cardiovascular: Negative for chest pain, irregular heartbeat, palpitations and syncope.   Respiratory: Negative for shortness of breath.    All other systems reviewed and are negative.         Objective:         There were no vitals taken for this visit.    Constitutional:       General: Not in acute distress.     Appearance: Well-developed.   Eyes:      General: No scleral icterus.     Conjunctiva/sclera:  Conjunctivae normal.      Pupils: Pupils are equal, round, and reactive to light.   HENT:      Head: Normocephalic and atraumatic.   Neck:      Musculoskeletal: Normal range of motion.      Thyroid: No thyromegaly.   Pulmonary:      Effort: Pulmonary effort is normal.      Breath sounds: Normal breath sounds.   Cardiovascular:      Normal rate. Regular rhythm.      Murmurs: There is a grade 2/6 systolic murmur.   Abdominal:      General: Bowel sounds are normal.      Palpations: Abdomen is soft.   Musculoskeletal: Normal range of motion.   Skin:     General: Skin is warm and dry.   Neurological:      Mental Status: Alert and oriented to person, place, and time.         In-Office Procedure(s):  Procedures  Pacemaker Eval interpreted by [facility] [id number]  Battery 10 yrs to corrina    P wave 2mv  Threshold 0.5V  Impedance 350 ohms    R wave 7mv  Threshold 0.8V  Impedance 610 ohms    Events - no AF  ASCVD RIsk Score::  The ASCVD Risk score (Ronalbetsy AVINA Jr., et al., 2013) failed to calculate for the following reasons:    The 2013 ASCVD risk score is only valid for ages 40 to 79    Recent Radiology:  Imaging Results (Most Recent)     None          Lab Review:   Orders Only on 12/01/2020   Component Date Value   • Glucose 12/01/2020 100*   • BUN 12/01/2020 23    • Creatinine 12/01/2020 1.56*   • eGFR Non African Am 12/01/2020 41*   • eGFR  Am 12/01/2020 47*   • BUN/Creatinine Ratio 12/01/2020 15    • Sodium 12/01/2020 142    • Potassium 12/01/2020 4.7    • Chloride 12/01/2020 100    • Total CO2 12/01/2020 25    • Calcium 12/01/2020 8.9    • BMP 12/01/2020 Comment                 Assessment:          Diagnosis Plan   1. Complete heart block (CMS/HCC)     2. Presence of cardiac pacemaker     3. Paroxysmal atrial fibrillation (CMS/HCC)            Plan:      1. Complete heart block - s/p pacemaker  2. Paroxysmal AF - no recurrence by device interrogation, on Xarelto and metoprolol  3. Pacemaker followup - normal device  function    Continue remote monitoring  RTC 1 year      Level of Care:                 Kory Corrales MD  12/07/20  .

## 2020-12-17 ENCOUNTER — RESULTS ENCOUNTER (OUTPATIENT)
Dept: CARDIOLOGY | Facility: CLINIC | Age: 80
End: 2020-12-17

## 2020-12-17 DIAGNOSIS — I10 ESSENTIAL HYPERTENSION: ICD-10-CM

## 2020-12-17 LAB
BUN SERPL-MCNC: 24 MG/DL (ref 8–27)
BUN/CREAT SERPL: 15 (ref 10–24)
CALCIUM SERPL-MCNC: 9.6 MG/DL (ref 8.6–10.2)
CHLORIDE SERPL-SCNC: 102 MMOL/L (ref 96–106)
CO2 SERPL-SCNC: 25 MMOL/L (ref 20–29)
CREAT SERPL-MCNC: 1.62 MG/DL (ref 0.76–1.27)
GLUCOSE SERPL-MCNC: 94 MG/DL (ref 65–99)
POTASSIUM SERPL-SCNC: 4.9 MMOL/L (ref 3.5–5.2)
SODIUM SERPL-SCNC: 139 MMOL/L (ref 134–144)

## 2020-12-21 ENCOUNTER — TELEPHONE (OUTPATIENT)
Dept: CARDIOLOGY | Facility: CLINIC | Age: 80
End: 2020-12-21

## 2020-12-21 NOTE — TELEPHONE ENCOUNTER
2020-Dr Melendez  Pt: Ronni Menchacarod  : 1940  Number:    Reason for Call:   Pt. Called and states that he had lab work on the . He would like to know about the labs. I told him that I would give you the message. His call back number is

## 2020-12-22 ENCOUNTER — OFFICE VISIT (OUTPATIENT)
Dept: CARDIOLOGY | Facility: CLINIC | Age: 80
End: 2020-12-22

## 2020-12-22 VITALS
HEIGHT: 70 IN | BODY MASS INDEX: 31.5 KG/M2 | DIASTOLIC BLOOD PRESSURE: 73 MMHG | WEIGHT: 220 LBS | HEART RATE: 63 BPM | SYSTOLIC BLOOD PRESSURE: 148 MMHG

## 2020-12-22 DIAGNOSIS — I10 ESSENTIAL HYPERTENSION: Primary | ICD-10-CM

## 2020-12-22 PROCEDURE — 99213 OFFICE O/P EST LOW 20 MIN: CPT | Performed by: INTERNAL MEDICINE

## 2020-12-22 NOTE — PROGRESS NOTES
Cardiology clinic note  Pardeep Melendez MD, PhD  Subjective:     Encounter Date:12/22/2020      Patient ID: Ronni Mauricio is a 80 y.o. male.    Chief Complaint:  Chief Complaint   Patient presents with   • Follow-up     Verbal consent obtained for telehealth visit today    Televisit time 16 minutes with additional time and charting    HPI:  History of Present Illness  previously I had the pleasure to see this very pleasant 79-year-old gentleman in follow-up today from hospitalization and catheterization.  He has preserved LV systolic function with significant coronary disease and small vessel disease in his LAD as well as diagonal branch and septal perforators too small for intervention as compared to the size of my 5 [place] diagnostic catheter best treated with medications, lipid-lowering therapy antihypertensives and antianginals.  He also had an ostial PDA lesion of 80% with FFR value of 0.96 not meeting hemodynamic significance.  Otherwise he had nonobstructive disease in the proximal RCA proximal LAD and proximal circumflex.  He had preserved LV systolic function with EF of at least 55 to 60%.  He had an abnormal stress test in the apical lateral portion likely owing to small caliber distal LAD that did not reach the apex as well as small caliber diagonal branch.  We have chosen medical therapy and he has had no chest pain since discharge.  His blood pressure today is 152/80 which is been his typical blood pressure and his heart rate is paced at 60.  He has a pacemaker for tachybradycardia syndrome that is in place.  He is on amlodipine benazepril 10/20 daily as well as aspirin 325 daily and rivaroxaban 20 mg daily for stroke risk reduction with paroxysmal atrial fibrillation.  He has not been on beta-blockade but he has a backup pacemaker and we previously started Coreg 3.125 mg p.o. twice daily however he says he began to have chest pain and discontinue this medication.  However exertional chest pain  continued even after discontinuation of this medicine.  He is fine at rest but says with exertion or 1 flight of stairs he gets very short of breath and gets chest heaviness which is progressed to chest pain.  We reviewed his catheterization films again today which shows small vessels peripherally most specifically in the LAD territory.  His blood pressure today is 148/68 and his heart rate is 70 we did discuss additional antianginals such as isosorbide mononitrate 30 daily which we will try.  Additionally will change Coreg to 25 of metoprolol XL daily as more beta-1 selective.  His weight is stable at 216 pounds and he is well compensated with no edema no heart failure     Today we had telehealth visit today to follow-up his edema as well as recent labs which demonstrate creatinine up to 1.6 on twice daily diuretics however with edema much improved on Lasix 20 mg p.o. b.i.d. He does remark that his shortness of breath sometimes improved but he is still having exertional chest discomfort with chronic stable angina.  We did discussed 3 options of continued medical therapy, stress testing to identify regional ischemia noting ostial PDA or distal RCA bifurcation disease, small vessel disease not amenable to intervention in the lateral wall, small caliber distal LAD with difficult anatomic course with very small vessel unlikely to be conducive long-term to stent patency.  He does not wish any procedures to be done and elected for medical therapy until after the 1st of the year to get through the holidays.  He will go to the hospital for any refractory chest pain and we will perform stenting of the distal RCA into the PDA as well as evaluate the LAD for possible small vessel PCI with  Jayson drug-eluting stent with aggressive post dilatation if able.  He prefers to wait given stability of symptoms given community coronavirus concerns to avoid hospital exposure which is reasonable at this time.  Complex small vessel anatomy  is known as above and below.      plan   Starting on antiplatelet therapy with Plavix today, stop aspirin  Continue Xarelto   Anti anginal therapy with Ranexa 500 mg p.o. b.i.d.  Continue long-acting nitrate  Back to clinic 2nd week of January for symptom reassessment        Review of systems x14 point review of systems is negative except was mentioned above     Historical data copied for from previous encounters and EMR is unchanged      films from his heart catheterization lower reviewed again in person with the patient today with identification of distal RCA stenosis which was FFR negative previously as well as distal LAD with difficult anatomy conducive to long-term patency is small caliber stents.     Left heart catheterization 7-9-2020  Physicians      Panel Physicians Referring Physician Case Authorizing Physician   Pardeep Melendez MD (Primary)   Deam, Kory Mcfadden MD   Indications              Dyspnea on exertion [R06.00 (ICD-10-CM)]   Abnormal nuclear stress test [R94.39 (ICD-10-CM)]   Paroxysmal atrial fibrillation (CMS/HCC) [I48.0 (ICD-10-CM)]           Conclusion       Pardeep Melendez MD, PhD  [facility] cardiology  Date of service 7-9-2020     Procedure  1.  Left heart catheterization with coronary angiography left ventriculography via right radial approach  2.  Fractional flow reserve of the distal RCA bifurcation into PDA with intracoronary adenosine for maximal hyperemia and trans-stenosis gradient assessment     After informed consent the patient was brought to the catheterization lab sterilely prepped and draped in usual fashion with exposure the right wrist for right radial access via micropuncture modified Seldinger technique with placement a 5/6 slender sheath to the radial artery under fluoroscopic guidance which was aspirated flushed with heparinized saline.  Standard cocktail was administered with heparin nitroglycerin and verapamil intra-arterially.  An 035  guidewire was advanced to the level aortic valve followed by JL 3 and JR4 diagnostic catheters for selective left and right coronary angiography respectively.  The JR4 was used across the aortic valve followed by exchange for a exchange length J-wire and placement of a [place] pigtail catheter across the aortic valve for transaortic valve pressure gradient assessment given history of moderate aortic stenosis and subjective shortness of air and dyspnea on exertion.  After trans-valvular gradient assessment as well as EDP assessment hand-injection of 8 cc of contrast was used for left-ventricular Griffey in FLORES position.  There was a pullback revealed no significant drift and all catheters and equipment were ultimately removed.  With borderline obstructive CAD at the ostial PDA decision was made for FFR given abnormal stress on this location with diminished counts in the inferior inferolateral wall.  There was MIRNA-3 flow throughout the RCA preprocedurally.  After heparinization with 70 units/kg of heparin for ACT greater than 275 a 6 [place] JR4 guide was used to engage the ostium of the RCA followed by standard FFR with equalization proximally and pressure gradient assessment and fractional flow reserve with intracoronary 130 mcg of adenosine injected followed by FFR measurement.  Lowest FFR value of 0.96 not hemodynamically significant.  The wire was then pulled back showing no significant drift and then the catheter and wire were ultimately removed and the sheath flushed with heparinized saline.  Patient tolerated the procedure well with no complications.  He left the Cath Lab chest pain-free hemodynamically electrically stable alert talking to staff neurologically grossly intact.  TR band was placed in the Cath Lab with immediate hemostasis     Complications none  Blood loss less than 10 cc  Contrast used is less than 120 cc  Moderate conscious sedation time of 1 hour  Conscious sedation was utilized with IV  Versed and fentanyl administered by registered nurse with continuous ECG pulse oximeter hematuric monitor throughout the entirety the case supervised by myself     Findings  1.  Opening aortic pressure 152/76  2.  Closing pressure unchanged  3.  LV pressure 140/9 with an EDP of 15 mmHg  4.  Trans-aortic valve gradient 12 to 14 mmHg peak to peak gradient  5.  Preserved LV systolic function EF is 60%     Angiography  1.  Left main is a medium large caliber vessel giving rise to LAD and nondominant circumflex.  There is distal left main 30% narrowing only  2.  The LAD is a medium caliber vessel with proximal and mid diffuse luminal irregularities nothing greater than 30%.  The distal LAD essentially has dual LAD physiology with bifurcation into a continuation LAD which does not reach the apex and gives off septal perforators as well as immediate takeoff of a bifurcating diagonal branch that courses to the apical lateral and anterolateral wall.  There is no obstructive disease of this bifurcating distal diagonal branch.  The continuation LAD is small in size likely 2 mm or less with proximal 70% disease but again very small caliber portion of the vessel likely best treated medically given small caliber and MIRNA-3 flow.  The first diagonal branch has ostial 30% narrowing only and only diffuse luminal regularities after and then bifurcates distally with no obstructive disease after the proximal portion.  MIRNA-3 flow throughout the LAD distribution  3.  The circumflex is a medium caliber nondominant vessel with continuation circumflex, and small obtuse marginal branches with limited course along the lateral wall.  There is no obstructive disease in the circumflex distribution greater than 30%.  4.  The RCA is a large-caliber dominant vessel with ostial 30% narrowing.  The proximal mid and distal portion have diffuse luminal irregularities nothing greater than 30%.  The distal RCA at the bifurcation narrows to approximately  60% followed by takeoff of a medium caliber large PLV branch which trifurcates distally and perfuses the inferolateral wall but has MIRNA-3 flow.  The ostium of this PLV branch is widely patent with less than 30% narrowing.  The ostium of the PDA branch has 70 perhaps 80% stenosis and no obstructive disease thereafter.  FFR of the distal RCA into PDA branch representing the tightest stenosis was hemodynamically insignificant at 0.96 best treated medically per guidelines     Insignificant FFR value 0.96 of the distal RCA into RPDA branch.     Conclusions and recommendations     Abnormal stress location was in the inferior and inferolateral wall however FFR is insignificant and there is no obstructive disease from the RCA into PLV branch.  Abnormal stress location of the apical lateral portion likely owing to small caliber distal LAD which is again best treated medically given small size and low likelihood of long-term patency with small caliber stent in this location as well as possible complicated placement noting trifurcation of this portion of the LAD..  Preserved LV systolic function  Slightly high LVEDP at 15 mmHg  No significant transaortic valve gradient, mild to moderate hemodynamic aortic stenosis maximally by Ryan pigtail assessment of simultaneous transvalvular gradients.  Medical management with aspirin and statin, beta-blocker as tolerated with pacemaker in place for tachybradycardia syndrome with atrial sensed ventricular paced rhythm presently with history of paroxysmal atrial fibrillation  Diet and exercise per AHA guidelines  Restart anticoagulation tomorrow is okay  Restrictions discussed with respect to lifting with right arm status post radial access  Follow-up in clinic in 2 to 4 weeks with cardiology     It is a pleasure to be involved in his cardiovascular care.  Please call with any questions or concerns  Pardeep Melendez MD, PhD          The following portions of the patient's history were  reviewed and updated as appropriate: allergies, current medications, past family history, past medical history, past social history, past surgical history and problem list.    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)   • Stable angina pectoris (CMS/HCC)   • Complete heart block (CMS/HCC)       Past Medical History:  Past Medical History:   Diagnosis Date   • Atrial fibrillation (CMS/HCC)    • Benign essential HTN    • Pacemaker    • Sick sinus syndrome (CMS/HCC)        Past Surgical History:  Past Surgical History:   Procedure Laterality Date   • CARDIAC CATHETERIZATION N/A 7/9/2020    Procedure: Left Heart Cath;  Surgeon: Pardeep Melendez MD;  Location: [facility] CATH INVASIVE LOCATION;  Service: Cardiovascular;  Laterality: N/A;   • CARDIAC CATHETERIZATION N/A 7/9/2020    Procedure: Coronary angiography;  Surgeon: Pardeep Melendez MD;  Location: [facility] CATH INVASIVE LOCATION;  Service: Cardiovascular;  Laterality: N/A;   • CARDIAC CATHETERIZATION N/A 7/9/2020    Procedure: Left ventriculography;  Surgeon: Pardeep Melendez MD;  Location: [facility] CATH INVASIVE LOCATION;  Service: Cardiovascular;  Laterality: N/A;   • CARDIAC CATHETERIZATION  7/9/2020    Procedure: Functional Flow [place];  Surgeon: Pardeep Melendez MD;  Location: [facility] CATH INVASIVE LOCATION;  Service: Cardiovascular;;   • CARDIAC ELECTROPHYSIOLOGY PROCEDURE N/A 5/22/2020    Procedure: PPM battery change. [facility] change out;  Surgeon: Kory Corrales MD;  Location: [facility] CATH INVASIVE LOCATION;  Service: Cardiology;  Laterality: N/A;   •  "INSERT / REPLACE / REMOVE PACEMAKER      [facility]   • PACEMAKER REPLACEMENT  05/22/2020    [facility]    • SINUS SURGERY         Social History:  Social History     Socioeconomic History   • Marital status:      Spouse name: Not on file   • Number of children: Not on file   • Years of education: Not on file   • Highest education level: Not on file   Tobacco Use   • Smoking status: Never Smoker   • Smokeless tobacco: Former User     Types: Chew   Substance and Sexual Activity   • Alcohol use: Yes     Comment: occasional   • Drug use: No   • Sexual activity: Defer       Allergies:  Allergies   Allergen Reactions   • Codeine Itching   • Morphine Itching       Immunizations:    There is no immunization history on file for this patient.    ROS:  ROS       Objective:         /73   Pulse 63   Ht 177.8 cm (70\")   Wt 99.8 kg (220 lb)   BMI 31.57 kg/m²     Physical Exam  Unable get physical exam finding secondary to telehealth visit today  In-Office Procedure(s):  Procedures    ASCVD RIsk Score::  The ASCVD Risk score (Ronal AVINA Jr., et al., 2013) failed to calculate for the following reasons:    The 2013 ASCVD risk score is only valid for ages 40 to 79    Recent Radiology:  Imaging Results (Most Recent)     None          Lab Review:   Results Encounter on 12/17/2020   Component Date Value   • Glucose 12/16/2020 94    • BUN 12/16/2020 24    • Creatinine 12/16/2020 1.62*   • eGFR Non African Am 12/16/2020 39*   • eGFR  Am 12/16/2020 45*   • BUN/Creatinine Ratio 12/16/2020 15    • Sodium 12/16/2020 139    • Potassium 12/16/2020 4.9    • Chloride 12/16/2020 102    • Total CO2 12/16/2020 25    • Calcium 12/16/2020 9.6    Orders Only on 12/01/2020   Component Date Value   • Glucose 12/01/2020 100*   • BUN 12/01/2020 23    • Creatinine 12/01/2020 1.56*   • eGFR Non African Am 12/01/2020 41*   • eGFR  Am 12/01/2020 47*   • BUN/Creatinine Ratio 12/01/2020 15    • Sodium 12/01/2020 142    • Potassium " 12/01/2020 4.7    • Chloride 12/01/2020 100    • Total CO2 12/01/2020 25    • Calcium 12/01/2020 8.9    • BMP 12/01/2020 Comment    Results Encounter on 08/19/2020   Component Date Value   • Glucose 08/28/2020 100*   • BUN 08/28/2020 28*   • Creatinine 08/28/2020 1.28*   • eGFR Non African Am 08/28/2020 52*   • eGFR  Am 08/28/2020 60    • BUN/Creatinine Ratio 08/28/2020 22    • Sodium 08/28/2020 140    • Potassium 08/28/2020 4.8    • Chloride 08/28/2020 100    • Total CO2 08/28/2020 28    • Calcium 08/28/2020 9.5    Admission on 07/09/2020, Discharged on 07/09/2020   Component Date Value   • Activated Clotting Time  07/09/2020 208*   Lab on 07/07/2020   Component Date Value   • COVID19 07/07/2020 Not Detected    Lab on 07/07/2020   Component Date Value   • Glucose 07/07/2020 101*   • BUN 07/07/2020 18    • Creatinine 07/07/2020 1.27    • Sodium 07/07/2020 138    • Potassium 07/07/2020 4.5    • Chloride 07/07/2020 103    • CO2 07/07/2020 26.5    • Calcium 07/07/2020 9.6    • Total Protein 07/07/2020 7.3    • Albumin 07/07/2020 4.30    • ALT (SGPT) 07/07/2020 12    • AST (SGOT) 07/07/2020 17    • Alkaline Phosphatase 07/07/2020 65    • Total Bilirubin 07/07/2020 0.5    • eGFR Non African Amer 07/07/2020 54*   • Globulin 07/07/2020 3.0    • A/G Ratio 07/07/2020 1.4    • BUN/Creatinine Ratio 07/07/2020 14.2    • Anion Gap 07/07/2020 8.5    • Protime 07/07/2020 16.7*   • INR 07/07/2020 1.38*   • WBC 07/07/2020 10.36    • RBC 07/07/2020 4.59    • Hemoglobin 07/07/2020 12.6*   • Hematocrit 07/07/2020 39.1    • MCV 07/07/2020 85.2    • MCH 07/07/2020 27.5    • MCHC 07/07/2020 32.2    • RDW 07/07/2020 12.5    • RDW-SD 07/07/2020 38.2    • MPV 07/07/2020 10.0    • Platelets 07/07/2020 383    • Neutrophil % 07/07/2020 61.7    • Lymphocyte % 07/07/2020 24.8    • Monocyte % 07/07/2020 9.0    • Eosinophil % 07/07/2020 2.4    • Basophil % 07/07/2020 1.4    • Immature Grans % 07/07/2020 0.7*   • Neutrophils, Absolute  07/07/2020 6.40    • Lymphocytes, Absolute 07/07/2020 2.57    • Monocytes, Absolute 07/07/2020 0.93*   • Eosinophils, Absolute 07/07/2020 0.25    • Basophils, Absolute 07/07/2020 0.14    • Immature Grans, Absolute 07/07/2020 0.07*   • nRBC 07/07/2020 0.0                 Assessment:          Diagnosis Plan   1. Essential hypertension  Basic Metabolic Panel          Plan:      Coronary artery disease  80% PDA with FFR 0.96, nonobstructive  LAD distal disease 80% however very small vessel less than 2 mm in diameter distal to stenosis, medical management will be attempted prior to small vessel intervention possibly with [place] stent  Diagonal branch 70% small caliber distal to stenosis, medical management       Start Plavix, stop aspirin, continue Xarelto  Continue afterload reduction  Start beta-blocker Toprol at 25 XL daily  Angina : we will start Imdur 30 daily, continue   Ranexa 500 mg p.o. b.i.d.  Low-dose statin started previously atorvastatin 10 daily, titrate up as allowed by symptoms of myalgias      Paroxysmal atrial fibrillation  Xarelto, rate rhythm control strategy, dual-chamber pacemaker for tachybradycardia syndrome, has backup for starting Coreg twice daily with hypertension and coronary disease     Essential hypertension uncontrolled  Metoprolol and Imdur added     Telehealth visit 30 days, get BMP next week for follow-up of creatinine on twice daily diuretics    He will go to Lasix 20 mg/day with extra dose as needed any swelling or shortness of breath         Pardeep Melendez MD PhD     Time documented above      Level of Care:                 Pardeep Melendez MD  12/22/20  .

## 2020-12-23 NOTE — TELEPHONE ENCOUNTER
CARLENE  Please send lab order to [email or web address] in [place] phone [phone] fx#[phone]    Pt question if he needed a televisit prior to 1/14/20  cb#[phone]

## 2020-12-28 NOTE — PROGRESS NOTES
Subjective:     Encounter Date:11/12/2020      Patient ID: Ronni Mauricio is a 80 y.o. male.    Chief Complaint:  Chief Complaint   Patient presents with   • Follow-up       HPI:  History of Present Illness  I had the pleasure to see this very pleasant 79-year-old gentleman in follow-up today from hospitalization and catheterization.  He has preserved LV systolic function with significant coronary disease and small vessel disease in his LAD as well as diagonal branch and septal perforators too small for intervention as compared to the size of my 5 [place] diagnostic catheter best treated with medications, lipid-lowering therapy antihypertensives and antianginals.  He also had an ostial PDA lesion of 80% with FFR value of 0.96 not meeting hemodynamic significance.  Otherwise he had nonobstructive disease in the proximal RCA proximal LAD and proximal circumflex.  He had preserved LV systolic function with EF of at least 55 to 60%.  He had an abnormal stress test in the apical lateral portion likely owing to small caliber distal LAD that did not reach the apex as well as small caliber diagonal branch.  We have chosen medical therapy and he has had no chest pain since discharge.  His blood pressure today is 152/80 which is been his typical blood pressure and his heart rate is paced at 60.  He has a pacemaker for tachybradycardia syndrome that is in place.  He is on amlodipine benazepril 10/20 daily as well as aspirin 325 daily and rivaroxaban 20 mg daily for stroke risk reduction with paroxysmal atrial fibrillation.  He has not been on beta-blockade but he has a backup pacemaker and we previously started Coreg 3.125 mg p.o. twice daily however he says he began to have chest pain and discontinue this medication.  However exertional chest pain continued even after discontinuation of this medicine.  He is fine at rest but says with exertion or 1 flight of stairs he gets very short of breath and gets chest heaviness  which is progressed to chest pain.  We reviewed his catheterization films again today which shows small vessels peripherally most specifically in the LAD territory.  His blood pressure today is 148/68 and his heart rate is 70 we did discuss additional antianginals such as isosorbide mononitrate 30 daily which we will try.  Additionally will change Coreg to 25 of metoprolol XL daily as more beta-1 selective.  His weight is stable at 216 pounds and he is well compensated with no edema no heart failure    Since last clinic visit patient has been compliant with loop diuretics with some improvement in his edema but he still complains of dyspnea exertion and CCS class II angina with abnormal stress test as above and very small caliber LAD and diagonal branch along the anterior and anterolateral wall.  We discussed revascularization and looked at his films again today which would likely need very small caliber stent with bifurcation optimization.  Presently he does wish to continue to defer this in the setting of community coronavirus concerns.  His EF is unchanged at 55%.  We have down titrated his calcium channel blocker with preservation of his benazepril with augmentation of Lasix twice daily for minimization of volume as well as mild fluid restriction.  He is pending a BMP in 1 week and will come back to clinic in 2 weeks for reevaluation if he continues to have chest pain that is not relieved to go to the hospital versus elective revascularization if symptoms become more frequent more severe or longer duration.        Review of systems x14 point review of systems is negative except was mentioned above     Historical data copied for from previous encounters and EMR is unchanged  Previously  Left heart catheterization 7-9-2020  Physicians      Panel Physicians Referring Physician Case Authorizing Physician   Pardeep Melendez MD (Primary)   Deam, Kory Mcfadden MD   Indications              Dyspnea on exertion  [R06.00 (ICD-10-CM)]   Abnormal nuclear stress test [R94.39 (ICD-10-CM)]   Paroxysmal atrial fibrillation (CMS/HCC) [I48.0 (ICD-10-CM)]           Conclusion       Pardeep Melendez MD, PhD  [facility] cardiology  Date of service 7-9-2020     Procedure  1.  Left heart catheterization with coronary angiography left ventriculography via right radial approach  2.  Fractional flow reserve of the distal RCA bifurcation into PDA with intracoronary adenosine for maximal hyperemia and trans-stenosis gradient assessment     After informed consent the patient was brought to the catheterization lab sterilely prepped and draped in usual fashion with exposure the right wrist for right radial access via micropuncture modified Seldinger technique with placement a 5/6 slender sheath to the radial artery under fluoroscopic guidance which was aspirated flushed with heparinized saline.  Standard cocktail was administered with heparin nitroglycerin and verapamil intra-arterially.  An 035 guidewire was advanced to the level aortic valve followed by JL 3 and JR4 diagnostic catheters for selective left and right coronary angiography respectively.  The JR4 was used across the aortic valve followed by exchange for a exchange length J-wire and placement of a [place] pigtail catheter across the aortic valve for transaortic valve pressure gradient assessment given history of moderate aortic stenosis and subjective shortness of air and dyspnea on exertion.  After trans-valvular gradient assessment as well as EDP assessment hand-injection of 8 cc of contrast was used for left-ventricular Griffey in FLORES position.  There was a pullback revealed no significant drift and all catheters and equipment were ultimately removed.  With borderline obstructive CAD at the ostial PDA decision was made for FFR given abnormal stress on this location with diminished counts in the inferior inferolateral wall.  There was MIRNA-3 flow throughout the  RCA preprocedurally.  After heparinization with 70 units/kg of heparin for ACT greater than 275 a 6 [place] JR4 guide was used to engage the ostium of the RCA followed by standard FFR with equalization proximally and pressure gradient assessment and fractional flow reserve with intracoronary 130 mcg of adenosine injected followed by FFR measurement.  Lowest FFR value of 0.96 not hemodynamically significant.  The wire was then pulled back showing no significant drift and then the catheter and wire were ultimately removed and the sheath flushed with heparinized saline.  Patient tolerated the procedure well with no complications.  He left the Cath Lab chest pain-free hemodynamically electrically stable alert talking to staff neurologically grossly intact.  TR band was placed in the Cath Lab with immediate hemostasis     Complications none  Blood loss less than 10 cc  Contrast used is less than 120 cc  Moderate conscious sedation time of 1 hour  Conscious sedation was utilized with IV Versed and fentanyl administered by registered nurse with continuous ECG pulse oximeter hematuric monitor throughout the entirety the case supervised by myself     Findings  1.  Opening aortic pressure 152/76  2.  Closing pressure unchanged  3.  LV pressure 140/9 with an EDP of 15 mmHg  4.  Trans-aortic valve gradient 12 to 14 mmHg peak to peak gradient  5.  Preserved LV systolic function EF is 60%     Angiography  1.  Left main is a medium large caliber vessel giving rise to LAD and nondominant circumflex.  There is distal left main 30% narrowing only  2.  The LAD is a medium caliber vessel with proximal and mid diffuse luminal irregularities nothing greater than 30%.  The distal LAD essentially has dual LAD physiology with bifurcation into a continuation LAD which does not reach the apex and gives off septal perforators as well as immediate takeoff of a bifurcating diagonal branch that courses to the apical lateral and anterolateral wall.   There is no obstructive disease of this bifurcating distal diagonal branch.  The continuation LAD is small in size likely 2 mm or less with proximal 70% disease but again very small caliber portion of the vessel likely best treated medically given small caliber and MIRNA-3 flow.  The first diagonal branch has ostial 30% narrowing only and only diffuse luminal regularities after and then bifurcates distally with no obstructive disease after the proximal portion.  MIRNA-3 flow throughout the LAD distribution  3.  The circumflex is a medium caliber nondominant vessel with continuation circumflex, and small obtuse marginal branches with limited course along the lateral wall.  There is no obstructive disease in the circumflex distribution greater than 30%.  4.  The RCA is a large-caliber dominant vessel with ostial 30% narrowing.  The proximal mid and distal portion have diffuse luminal irregularities nothing greater than 30%.  The distal RCA at the bifurcation narrows to approximately 60% followed by takeoff of a medium caliber large PLV branch which trifurcates distally and perfuses the inferolateral wall but has MIRNA-3 flow.  The ostium of this PLV branch is widely patent with less than 30% narrowing.  The ostium of the PDA branch has 70 perhaps 80% stenosis and no obstructive disease thereafter.  FFR of the distal RCA into PDA branch representing the tightest stenosis was hemodynamically insignificant at 0.96 best treated medically per guidelines     Insignificant FFR value 0.96 of the distal RCA into RPDA branch.     Conclusions and recommendations     Abnormal stress location was in the inferior and inferolateral wall however FFR is insignificant and there is no obstructive disease from the RCA into PLV branch.  Abnormal stress location of the apical lateral portion likely owing to small caliber distal LAD which is again best treated medically given small size and low likelihood of long-term patency with small  caliber stent in this location as well as possible complicated placement noting trifurcation of this portion of the LAD..  Preserved LV systolic function  Slightly high LVEDP at 15 mmHg  No significant transaortic valve gradient, mild to moderate hemodynamic aortic stenosis maximally by [place] pigtail assessment of simultaneous transvalvular gradients.  Medical management with aspirin and statin, beta-blocker as tolerated with pacemaker in place for tachybradycardia syndrome with atrial sensed ventricular paced rhythm presently with history of paroxysmal atrial fibrillation  Diet and exercise per AHA guidelines  Restart anticoagulation tomorrow is okay  Restrictions discussed with respect to lifting with right arm status post radial access  Follow-up in clinic in 2 to 4 weeks with cardiology     It is a pleasure to be involved in his cardiovascular care.  Please call with any questions or concerns  Pardeep Melendez MD, PhD       The following portions of the patient's history were reviewed and updated as appropriate: allergies, current medications, past family history, past medical history, past social history, past surgical history and problem list.    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)   • Stable angina pectoris (CMS/HCC)   • Complete heart block (CMS/HCC)       Past Medical History:  Past Medical History:   Diagnosis Date   • Atrial fibrillation (CMS/HCC)    • Benign essential HTN    • Pacemaker    • Sick sinus syndrome (CMS/HCC)        Past Surgical History:  Past Surgical History:   Procedure Laterality Date   • CARDIAC  "CATHETERIZATION N/A 7/9/2020    Procedure: Left Heart Cath;  Surgeon: Pardeep Melendez MD;  Location: [facility] CATH INVASIVE LOCATION;  Service: Cardiovascular;  Laterality: N/A;   • CARDIAC CATHETERIZATION N/A 7/9/2020    Procedure: Coronary angiography;  Surgeon: Pardeep Melendez MD;  Location:  [facility] CATH INVASIVE LOCATION;  Service: Cardiovascular;  Laterality: N/A;   • CARDIAC CATHETERIZATION N/A 7/9/2020    Procedure: Left ventriculography;  Surgeon: Pardeep Melendez MD;  Location: [facility] CATH INVASIVE LOCATION;  Service: Cardiovascular;  Laterality: N/A;   • CARDIAC CATHETERIZATION  7/9/2020    Procedure: Functional Flow Carey;  Surgeon: Pardeep Melendez MD;  Location: [facility] CATH INVASIVE LOCATION;  Service: Cardiovascular;;   • CARDIAC ELECTROPHYSIOLOGY PROCEDURE N/A 5/22/2020    Procedure: PPM battery change. [facility] change out;  Surgeon: Kory Corrales MD;  Location: [facility] CATH INVASIVE LOCATION;  Service: Cardiology;  Laterality: N/A;   • INSERT / REPLACE / REMOVE PACEMAKER      [facility]   • PACEMAKER REPLACEMENT  05/22/2020    [facility]    • SINUS SURGERY         Social History:  Social History     Socioeconomic History   • Marital status:      Spouse name: Not on file   • Number of children: Not on file   • Years of education: Not on file   • Highest education level: Not on file   Tobacco Use   • Smoking status: Never Smoker   • Smokeless tobacco: Former User     Types: Chew   Substance and Sexual Activity   • Alcohol use: Yes     Comment: occasional   • Drug use: No   • Sexual activity: Defer       Allergies:  Allergies   Allergen Reactions   • Codeine Itching   • Morphine Itching       Immunizations:    There is no immunization history on file for this patient.    ROS:  ROS       Objective:         /58 (BP Location: Left arm, Patient Position: Sitting)   Pulse 60   Resp 18   Ht 177.8 cm (70\")   Wt 99.4 kg (219 lb 3.2 oz)   BMI 31.45 kg/m² "     Physical Exam  No acute distress alert and oriented x3 afebrile vital signs stable  Regular rate rhythm no rubs murmurs or gallops  Clear to auscultation bilaterally  Soft nontender nondistended bowel sounds positive  No clubbing cyanosis or edema  Extremities warm and dry no clubbing cyanosis or edema  Neurologically grossly intact bilaterally  Pacemaker site clean dry  Neurologically grossly intact bilaterally  Normal mood and affect today  Normocephalic atraumatic no JVD  Essentially unchanged from prior encounter   In-Office Procedure(s):  Procedures    ASCVD RIsk Score::  The ASCVD Risk score (Ronal AVINA Jr., et al., 2013) failed to calculate for the following reasons:    The 2013 ASCVD risk score is only valid for ages 40 to 79    Recent Radiology:  Imaging Results (Most Recent)     None          Lab Review:   Results Encounter on 08/19/2020   Component Date Value   • Glucose 08/28/2020 100*   • BUN 08/28/2020 28*   • Creatinine 08/28/2020 1.28*   • eGFR Non African Am 08/28/2020 52*   • eGFR  Am 08/28/2020 60    • BUN/Creatinine Ratio 08/28/2020 22    • Sodium 08/28/2020 140    • Potassium 08/28/2020 4.8    • Chloride 08/28/2020 100    • Total CO2 08/28/2020 28    • Calcium 08/28/2020 9.5    Admission on 07/09/2020, Discharged on 07/09/2020   Component Date Value   • Activated Clotting Time  07/09/2020 208*   Lab on 07/07/2020   Component Date Value   • COVID19 07/07/2020 Not Detected    Lab on 07/07/2020   Component Date Value   • Glucose 07/07/2020 101*   • BUN 07/07/2020 18    • Creatinine 07/07/2020 1.27    • Sodium 07/07/2020 138    • Potassium 07/07/2020 4.5    • Chloride 07/07/2020 103    • CO2 07/07/2020 26.5    • Calcium 07/07/2020 9.6    • Total Protein 07/07/2020 7.3    • Albumin 07/07/2020 4.30    • ALT (SGPT) 07/07/2020 12    • AST (SGOT) 07/07/2020 17    • Alkaline Phosphatase 07/07/2020 65    • Total Bilirubin 07/07/2020 0.5    • eGFR Non African Amer 07/07/2020 54*   • Globulin  07/07/2020 3.0    • A/G Ratio 07/07/2020 1.4    • BUN/Creatinine Ratio 07/07/2020 14.2    • Anion Gap 07/07/2020 8.5    • Protime 07/07/2020 16.7*   • INR 07/07/2020 1.38*   • WBC 07/07/2020 10.36    • RBC 07/07/2020 4.59    • Hemoglobin 07/07/2020 12.6*   • Hematocrit 07/07/2020 39.1    • MCV 07/07/2020 85.2    • MCH 07/07/2020 27.5    • MCHC 07/07/2020 32.2    • RDW 07/07/2020 12.5    • RDW-SD 07/07/2020 38.2    • MPV 07/07/2020 10.0    • Platelets 07/07/2020 383    • Neutrophil % 07/07/2020 61.7    • Lymphocyte % 07/07/2020 24.8    • Monocyte % 07/07/2020 9.0    • Eosinophil % 07/07/2020 2.4    • Basophil % 07/07/2020 1.4    • Immature Grans % 07/07/2020 0.7*   • Neutrophils, Absolute 07/07/2020 6.40    • Lymphocytes, Absolute 07/07/2020 2.57    • Monocytes, Absolute 07/07/2020 0.93*   • Eosinophils, Absolute 07/07/2020 0.25    • Basophils, Absolute 07/07/2020 0.14    • Immature Grans, Absolute 07/07/2020 0.07*   • nRBC 07/07/2020 0.0    Hospital Outpatient Visit on 06/22/2020   Component Date Value   • BSA 06/22/2020 2.2    • IVSd 06/22/2020 1.3    • LVIDd 06/22/2020 4.7    • LVIDs 06/22/2020 3.7    • LVPWd 06/22/2020 1.3    • IVS/LVPW 06/22/2020 0.99    • FS 06/22/2020 20.9    • EDV(Teich) 06/22/2020 100.1    • ESV(Teich) 06/22/2020 57.4    • EF(Teich) 06/22/2020 42.7    • EDV(cubed) 06/22/2020 100.8    • ESV(cubed) 06/22/2020 49.8    • EF(cubed) 06/22/2020 50.6    • LV mass(C)d 06/22/2020 228.2    • LV mass(C)dI 06/22/2020 105.0    • SV(Teich) 06/22/2020 42.7    • SI(Teich) 06/22/2020 19.6    • SV(cubed) 06/22/2020 51.0    • SI(cubed) 06/22/2020 23.5    • Ao root diam 06/22/2020 2.7    • Ao root area 06/22/2020 5.9    • ACS 06/22/2020 1.4    • LVOT diam 06/22/2020 2.0    • LVOT area 06/22/2020 3.3    • LVOT area(traced) 06/22/2020 3.1    • LVLd ap4 06/22/2020 8.1    • EDV(MOD-sp4) 06/22/2020 97.0    • LVLs ap4 06/22/2020 6.9    • ESV(MOD-sp4) 06/22/2020 43.0    • EF(MOD-sp4) 06/22/2020 55.7    • LVLd ap2  06/22/2020 8.3    • EDV(MOD-sp2) 06/22/2020 80.0    • LVLs ap2 06/22/2020 7.1    • ESV(MOD-sp2) 06/22/2020 44.0    • EF(MOD-sp2) 06/22/2020 45.0    • SV(MOD-sp4) 06/22/2020 54.0    • SI(MOD-sp4) 06/22/2020 24.8    • SV(MOD-sp2) 06/22/2020 36.0    • SI(MOD-sp2) 06/22/2020 16.6    • Ao root area (BSA correc* 06/22/2020 1.3    • LV Dupree Vol (BSA correct* 06/22/2020 44.6    • LV Sys Vol (BSA correcte* 06/22/2020 19.8    • EF(MOD-bp) 06/22/2020 50.7    • MV A dur 06/22/2020 0.16    • MV E max richmond 06/22/2020 67.8    • MV A max richmond 06/22/2020 109.4    • MV E/A 06/22/2020 0.62    • MV V2 max 06/22/2020 124.2    • MV max PG 06/22/2020 6.2    • MV V2 mean 06/22/2020 61.2    • MV mean PG 06/22/2020 1.8    • MV V2 VTI 06/22/2020 39.0    • MVA(VTI) 06/22/2020 2.0    • MV P1/2t max richmond 06/22/2020 90.3    • MV P1/2t 06/22/2020 99.3    • MVA(P1/2t) 06/22/2020 2.2    • MV dec slope 06/22/2020 266.2    • MV dec time 06/22/2020 363    • Ao pk richmond 06/22/2020 229.4    • Ao max PG 06/22/2020 21.0    • Ao max PG (full) 06/22/2020 17.7    • Ao V2 mean 06/22/2020 167.4    • Ao mean PG 06/22/2020 12.5    • Ao mean PG (full) 06/22/2020 10.8    • Ao V2 VTI 06/22/2020 55.4    • JÚNIOR(I,A) 06/22/2020 1.4    • JÚNIOR(I,D) 06/22/2020 1.4    • JÚNIOR(V,A) 06/22/2020 1.3    • JÚNIOR(V,D) 06/22/2020 1.3    • LV V1 max PG 06/22/2020 3.3    • LV V1 mean PG 06/22/2020 1.8    • LV V1 max 06/22/2020 91.3    • LV V1 mean 06/22/2020 61.5    • LV V1 VTI 06/22/2020 24.0    • SV(Ao) 06/22/2020 328.7    • SI(Ao) 06/22/2020 151.2    • SV(LVOT) 06/22/2020 78.2    • SI(LVOT) 06/22/2020 36.0    • PA V2 max 06/22/2020 131.7    • PA max PG 06/22/2020 6.9    • PA max PG (full) 06/22/2020 4.1    • PA acc time 06/22/2020 0.11    • RV V1 max PG 06/22/2020 2.9    • RV V1 mean PG 06/22/2020 1.1    • RV V1 max 06/22/2020 84.8    • RV V1 mean 06/22/2020 45.8    • RV V1 VTI 06/22/2020 13.9    • TR max richmond 06/22/2020 215.8    • RVSP(TR) 06/22/2020 21.6    • RAP systole 06/22/2020 3.0     • PA pr(Accel) 06/22/2020 31.5    • Pulm Sys Jude 06/22/2020 68.6    • Pulm Dupree Jude 06/22/2020 39.0    • Pulm S/D 06/22/2020 1.8    • Pulm A Revs Dur 06/22/2020 0.11    • Pulm A Revs Jude 06/22/2020 21.4    • MVA P1/2T LCG 06/22/2020 2.4    • Lat Peak E' Jude 06/22/2020 5.3    • Med Peak E' Jude 06/22/2020 6.3    •  CV ECHO EBONY - BZI_BMI 06/22/2020 31.6    •  CV ECHO EBONY - BSA(HA* 06/22/2020 2.2    •  CV ECHO EBONY - BZI_ME* 06/22/2020 99.8    •  CV ECHO EBONY - BZI_ME* 06/22/2020 177.8    • Avg E/e' ratio 06/22/2020 11.69    • Target HR (85%) 06/22/2020 118    • Max. Pred. HR (100%) 06/22/2020 139    •  CV VAS BP RIGHT ARM 06/22/2020 156/81    • RV S' 06/22/2020 14.00    • LA Volume Index 06/22/2020 28.0    • TAPSE (>1.6) 06/22/2020 2.70    Hospital Outpatient Visit on 06/22/2020   Component Date Value   •  CV STRESS PROTOCOL 1 06/22/2020 Pharmacologic    • Stage 1 06/22/2020 1    • Duration Min Stage 1 06/22/2020 0    • Duration Sec Stage 1 06/22/2020 10    • Stress Dose Regadenoson * 06/22/2020 0.4    • Stress Comments Stage 1 06/22/2020 10 sec bolus injection    • Baseline HR 06/22/2020 60    • Baseline BP 06/22/2020 169/80    • Peak HR 06/22/2020 76    • Percent Max Pred HR 06/22/2020 54.68    • Percent Target HR 06/22/2020 64    • Peak BP 06/22/2020 169/80    • Recovery HR 06/22/2020 65    • Recovery BP 06/22/2020 156/79    • Target HR (85%) 06/22/2020 118    • Max. Pred. HR (100%) 06/22/2020 139    Lab on 05/20/2020   Component Date Value   • Glucose 05/20/2020 101*   • BUN 05/20/2020 21    • Creatinine 05/20/2020 1.37*   • Sodium 05/20/2020 135*   • Potassium 05/20/2020 4.5    • Chloride 05/20/2020 99    • CO2 05/20/2020 26.1    • Calcium 05/20/2020 9.6    • Total Protein 05/20/2020 7.3    • Albumin 05/20/2020 4.20    • ALT (SGPT) 05/20/2020 13    • AST (SGOT) 05/20/2020 19    • Alkaline Phosphatase 05/20/2020 65    • Total Bilirubin 05/20/2020 0.4    • eGFR Non African Amer 05/20/2020 50*   •  Globulin 05/20/2020 3.1    • A/G Ratio 05/20/2020 1.4    • BUN/Creatinine Ratio 05/20/2020 15.3    • Anion Gap 05/20/2020 9.9    • Protime 05/20/2020 18.5*   • INR 05/20/2020 1.58*   • WBC 05/20/2020 11.52*   • RBC 05/20/2020 4.35    • Hemoglobin 05/20/2020 12.8*   • Hematocrit 05/20/2020 38.1    • MCV 05/20/2020 87.6    • MCH 05/20/2020 29.4    • MCHC 05/20/2020 33.6    • RDW 05/20/2020 12.4    • RDW-SD 05/20/2020 39.4    • MPV 05/20/2020 9.6    • Platelets 05/20/2020 362    • Neutrophil % 05/20/2020 62.7    • Lymphocyte % 05/20/2020 25.1    • Monocyte % 05/20/2020 8.6    • Eosinophil % 05/20/2020 2.2    • Basophil % 05/20/2020 1.1    • Immature Grans % 05/20/2020 0.3    • Neutrophils, Absolute 05/20/2020 7.22*   • Lymphocytes, Absolute 05/20/2020 2.89    • Monocytes, Absolute 05/20/2020 0.99*   • Eosinophils, Absolute 05/20/2020 0.25    • Basophils, Absolute 05/20/2020 0.13    • Immature Grans, Absolute 05/20/2020 0.04    • nRBC 05/20/2020 0.0    Lab on 05/20/2020   Component Date Value   • COVID19 05/20/2020 Not Detected                 Assessment:          Diagnosis Plan   1. Essential hypertension  Basic Metabolic Panel          Plan:        Coronary artery disease  80% PDA with FFR 0.96, nonobstructive  LAD distal disease 80% however very small vessel less than 2 mm in diameter distal to stenosis, medical management will be attempted prior to small vessel intervention possibly with J[place] stent  Diagonal branch 70% small caliber distal to stenosis, medical management  Continues to have CCS class II angina  Continue aspirin as well as Xarelto  Plan elective revascularization if he continues to have chest pain  Patient wished to defer at this time  Continue antianginals     Continue daily aspirin as well as Xarelto  Continue afterload reduction  Start beta-blocker Toprol at 25 XL daily  Angina : we will start Imdur 30 daily  Low-dose statin started previously atorvastatin 10 daily, recheck fasting lipid panel as  outpatient in 3 months     Paroxysmal atrial fibrillation  Xarelto, rate rhythm control strategy, dual-chamber pacemaker for tachybradycardia syndrome, on metoprolol XL twice daily     Essential hypertension well-controlled  Metoprolol and Imdur continued  Decrease amlodipine to 5 daily and benazepril to 10 daily with systolics of 100  Lasix twice daily     Return to clinic in 30 days to recheck blood pressure, reassess symptoms titrate medicines     Level of Care:          Level of Care:                 Pardeep Melendez MD  12/28/20  .

## 2021-01-14 ENCOUNTER — TELEMEDICINE (OUTPATIENT)
Dept: CARDIOLOGY | Facility: CLINIC | Age: 81
End: 2021-01-14

## 2021-01-14 VITALS
DIASTOLIC BLOOD PRESSURE: 76 MMHG | HEART RATE: 60 BPM | BODY MASS INDEX: 31.5 KG/M2 | SYSTOLIC BLOOD PRESSURE: 159 MMHG | HEIGHT: 70 IN | WEIGHT: 220 LBS

## 2021-01-14 DIAGNOSIS — I48.0 PAROXYSMAL ATRIAL FIBRILLATION (HCC): ICD-10-CM

## 2021-01-14 DIAGNOSIS — I25.118 CORONARY ARTERY DISEASE OF NATIVE ARTERY OF NATIVE HEART WITH STABLE ANGINA PECTORIS (HCC): Primary | ICD-10-CM

## 2021-01-14 DIAGNOSIS — I10 BENIGN ESSENTIAL HTN: ICD-10-CM

## 2021-01-14 PROCEDURE — 99214 OFFICE O/P EST MOD 30 MIN: CPT | Performed by: INTERNAL MEDICINE

## 2021-01-14 NOTE — PROGRESS NOTES
Subjective:     Encounter Date:01/14/2021      Patient ID: Ronni Mauricio is a 80 y.o. male.    Chief Complaint:  Chief Complaint   Patient presents with   • Follow-up     Verbal consent obtained for telehealth visit today    Unable to complete visit using a video connection to the patient. A phone visit was used to complete this visits. Total time of discussion was  16 minutes.  HPI:  History of Present Illness  previously I had the pleasure to see this very pleasant 79-year-old gentleman in follow-up today from hospitalization and catheterization.  He has preserved LV systolic function with significant coronary disease and small vessel disease in his LAD as well as diagonal branch and septal perforators too small for intervention as compared to the size of my 5 Slovenian diagnostic catheter best treated with medications, lipid-lowering therapy antihypertensives and antianginals.  He also had an ostial PDA lesion of 80% with FFR value of 0.96 not meeting hemodynamic significance.  Otherwise he had nonobstructive disease in the proximal RCA proximal LAD and proximal circumflex.  He had preserved LV systolic function with EF of at least 55 to 60%.  He had an abnormal stress test in the apical lateral portion likely owing to small caliber distal LAD that did not reach the apex as well as small caliber diagonal branch.  We have chosen medical therapy and he has had no chest pain since discharge.  His blood pressure today is 152/80 which is been his typical blood pressure and his heart rate is paced at 60.  He has a pacemaker for tachybradycardia syndrome that is in place.  He is on amlodipine benazepril 10/20 daily as well as aspirin 325 daily and rivaroxaban 20 mg daily for stroke risk reduction with paroxysmal atrial fibrillation.  He has not been on beta-blockade but he has a backup pacemaker and we previously started Coreg 3.125 mg p.o. twice daily however he says he began to have chest pain and discontinue this  medication.  However exertional chest pain continued even after discontinuation of this medicine.  He is fine at rest but says with exertion or 1 flight of stairs he gets very short of breath and gets chest heaviness which is progressed to chest pain.  We reviewed his catheterization films again today which shows small vessels peripherally most specifically in the LAD territory.  His blood pressure today is 148/68 and his heart rate is 70 we did discuss additional antianginals such as isosorbide mononitrate 30 daily which we will try.  Additionally will change Coreg to 25 of metoprolol XL daily as more beta-1 selective.  His weight is stable at 216 pounds and he is well compensated with no edema no heart failure     Today we had telehealth visit today to follow-up his edema as well as recent labs.  He says the chest pain is all but resolved although he still has some degree of exertional shortness of air which waxes and wanes.  Blood pressure today is 1 50-1 60 systolic which is elevated likely secondary to decreasing his Lotrel which is a combination of amlodipine and benazepril.  Last potassium was 4.9, creatinine improved to 1.4 from 1.6.  Overall he says he is doing better he says with volume is controlled, no significant edema which is improved greatly and he is only on once daily Lasix now.  After discussion on potential revascularization with improvement of his symptoms on medical therapy we will continue to defer LAD revascularization with very small distal LAD size and acute angle bifurcation with diagonal branch which is also diseased.  His EF is underlying normal as documented above.  Left heart catheterization results previously as below.  He is tolerating Ranexa.  He is tolerating antiplatelets      Assessment/ plan   Continue on antiplatelet therapy with Plavix today, stop aspirin  Continue Xarelto   Anti anginal therapy with Ranexa 500 mg p.o. b.i.d.  Continue long-acting nitrate  Back to clinic in 3  months        Review of systems x14 point review of systems is negative except was mentioned above     Historical data copied for from previous encounters and EMR is unchanged      films from his heart catheterization lower reviewed again in person with the patient today with identification of distal RCA stenosis which was FFR negative previously as well as distal LAD with difficult anatomy conducive to long-term patency is small caliber stents.     Left heart catheterization 7-9-2020  Physicians      Panel Physicians Referring Physician Case Authorizing Physician   Pardeep Melendez MD (Primary)   Deam, Kory Mcfadden MD   Indications              Dyspnea on exertion [R06.00 (ICD-10-CM)]   Abnormal nuclear stress test [R94.39 (ICD-10-CM)]   Paroxysmal atrial fibrillation (CMS/HCC) [I48.0 (ICD-10-CM)]           Conclusion       Pardeep Melendez MD, PhD  [facility] cardiology  Date of service 7-9-2020     Procedure  1.  Left heart catheterization with coronary angiography left ventriculography via right radial approach  2.  Fractional flow reserve of the distal RCA bifurcation into PDA with intracoronary adenosine for maximal hyperemia and trans-stenosis gradient assessment     After informed consent the patient was brought to the catheterization lab sterilely prepped and draped in usual fashion with exposure the right wrist for right radial access via micropuncture modified Seldinger technique with placement a 5/6 slender sheath to the radial artery under fluoroscopic guidance which was aspirated flushed with heparinized saline.  Standard cocktail was administered with heparin nitroglycerin and verapamil intra-arterially.  An 035 guidewire was advanced to the level aortic valve followed by JL 3 and JR4 diagnostic catheters for selective left and right coronary angiography respectively.  The JR4 was used across the aortic valve followed by exchange for a exchange length J-wire and placement  of a Ryan pigtail catheter across the aortic valve for transaortic valve pressure gradient assessment given history of moderate aortic stenosis and subjective shortness of air and dyspnea on exertion.  After trans-valvular gradient assessment as well as EDP assessment hand-injection of 8 cc of contrast was used for left-ventricular Griffey in FLORES position.  There was a pullback revealed no significant drift and all catheters and equipment were ultimately removed.  With borderline obstructive CAD at the ostial PDA decision was made for FFR given abnormal stress on this location with diminished counts in the inferior inferolateral wall.  There was MIRNA-3 flow throughout the RCA preprocedurally.  After heparinization with 70 units/kg of heparin for ACT greater than 275 a 6 Haitian JR4 guide was used to engage the ostium of the RCA followed by standard FFR with equalization proximally and pressure gradient assessment and fractional flow reserve with intracoronary 130 mcg of adenosine injected followed by FFR measurement.  Lowest FFR value of 0.96 not hemodynamically significant.  The wire was then pulled back showing no significant drift and then the catheter and wire were ultimately removed and the sheath flushed with heparinized saline.  Patient tolerated the procedure well with no complications.  He left the Cath Lab chest pain-free hemodynamically electrically stable alert talking to staff neurologically grossly intact.  TR band was placed in the Cath Lab with immediate hemostasis     Complications none  Blood loss less than 10 cc  Contrast used is less than 120 cc  Moderate conscious sedation time of 1 hour  Conscious sedation was utilized with IV Versed and fentanyl administered by registered nurse with continuous ECG pulse oximeter hematuric monitor throughout the entirety the case supervised by myself     Findings  1.  Opening aortic pressure 152/76  2.  Closing pressure unchanged  3.  LV pressure 140/9 with  an EDP of 15 mmHg  4.  Trans-aortic valve gradient 12 to 14 mmHg peak to peak gradient  5.  Preserved LV systolic function EF is 60%     Angiography  1.  Left main is a medium large caliber vessel giving rise to LAD and nondominant circumflex.  There is distal left main 30% narrowing only  2.  The LAD is a medium caliber vessel with proximal and mid diffuse luminal irregularities nothing greater than 30%.  The distal LAD essentially has dual LAD physiology with bifurcation into a continuation LAD which does not reach the apex and gives off septal perforators as well as immediate takeoff of a bifurcating diagonal branch that courses to the apical lateral and anterolateral wall.  There is no obstructive disease of this bifurcating distal diagonal branch.  The continuation LAD is small in size likely 2 mm or less with proximal 70% disease but again very small caliber portion of the vessel likely best treated medically given small caliber and MIRNA-3 flow.  The first diagonal branch has ostial 30% narrowing only and only diffuse luminal regularities after and then bifurcates distally with no obstructive disease after the proximal portion.  MIRNA-3 flow throughout the LAD distribution  3.  The circumflex is a medium caliber nondominant vessel with continuation circumflex, and small obtuse marginal branches with limited course along the lateral wall.  There is no obstructive disease in the circumflex distribution greater than 30%.  4.  The RCA is a large-caliber dominant vessel with ostial 30% narrowing.  The proximal mid and distal portion have diffuse luminal irregularities nothing greater than 30%.  The distal RCA at the bifurcation narrows to approximately 60% followed by takeoff of a medium caliber large PLV branch which trifurcates distally and perfuses the inferolateral wall but has MIRNA-3 flow.  The ostium of this PLV branch is widely patent with less than 30% narrowing.  The ostium of the PDA branch has 70 perhaps  80% stenosis and no obstructive disease thereafter.  FFR of the distal RCA into PDA branch representing the tightest stenosis was hemodynamically insignificant at 0.96 best treated medically per guidelines     Insignificant FFR value 0.96 of the distal RCA into RPDA branch.     Conclusions and recommendations     Abnormal stress location was in the inferior and inferolateral wall however FFR is insignificant and there is no obstructive disease from the RCA into PLV branch.  Abnormal stress location of the apical lateral portion likely owing to small caliber distal LAD which is again best treated medically given small size and low likelihood of long-term patency with small caliber stent in this location as well as possible complicated placement noting trifurcation of this portion of the LAD..  Preserved LV systolic function  Slightly high LVEDP at 15 mmHg  No significant transaortic valve gradient, mild to moderate hemodynamic aortic stenosis maximally by Ryan pigtail assessment of simultaneous transvalvular gradients.  Medical management with aspirin and statin, beta-blocker as tolerated with pacemaker in place for tachybradycardia syndrome with atrial sensed ventricular paced rhythm presently with history of paroxysmal atrial fibrillation  Diet and exercise per AHA guidelines  Restart anticoagulation tomorrow is okay  Restrictions discussed with respect to lifting with right arm status post radial access  Follow-up in clinic in 2 to 4 weeks with cardiology     It is a pleasure to be involved in his cardiovascular care.  Please call with any questions or concerns  Pardeep Melendez MD, PhD           The following portions of the patient's history were reviewed and updated as appropriate: allergies, current medications, past family history, past medical history, past social history, past surgical history and problem list.    Problem List:  Patient Active Problem List   Diagnosis   • Essential hypertension   • Sick  sinus syndrome (CMS/HCC)   • Presence of cardiac pacemaker   • Heart murmur   • Aortic stenosis, moderate   • Diastolic dysfunction   • Dyspnea on exertion   • Abnormal nuclear stress test   • Paroxysmal atrial fibrillation (CMS/HCC)   • Coronary artery disease of native artery of native heart with stable angina pectoris (CMS/HCC)   • Stable angina pectoris (CMS/HCC)   • Complete heart block (CMS/HCC)       Past Medical History:  Past Medical History:   Diagnosis Date   • Atrial fibrillation (CMS/HCC)    • Benign essential HTN    • Pacemaker    • Sick sinus syndrome (CMS/HCC)        Past Surgical History:  Past Surgical History:   Procedure Laterality Date   • CARDIAC CATHETERIZATION N/A 7/9/2020    Procedure: Left Heart Cath;  Surgeon: Pardeep Melendez MD;  Location[facility] C[facility]AS[facility];  Service: Cardiovascular;  Laterality: N/A;   • CARDIAC CATHETERIZATION N/A 7/9/2020    Procedure: Coronary angiography;  Surgeon: Pardeep Melendez MD;  Location: [facility] CATH [facility]ASIVE LOC[facility];  Service: Cardiovascular;  Laterality: N/A;   • CARDIAC CATHETERIZATION N/A 7/9/2020    Procedure: Left ventriculography;  Surgeon: Pardeep Melendez MD;  Location: [facility] CATH INVASIVE LOCATION;  Service: Cardiovascular;  Laterality: N/A;   • CARDIAC CATHETERIZATION  7/9/2020    Procedure: Functional Flow [place];  Surgeon: Pardeep Melendez MD;  Location: [facility] CATH INVASIVE LOCATION;  Service: Cardiovascular;;   • CARDIAC ELECTROPHYSIOLOGY PROCEDURE N/A 5/22/2020    Procedure: PPM battery change. [facility] [place] change out;  Surgeon: Kory Corrales MD;  Location: [facility] CATH INVASIVE LOCATION;  Service: Cardiology;  Laterality: N/A;   • INSERT / REPLACE / REMOVE PACEMAKER      [facility]   • PACEMAKER REPLACEMENT  05/22/2020    [facility]    • SINUS SURGERY         Social History:  Social History     Socioeconomic History   • Marital status:      Spouse name: Not on file   • Number of  "children: Not on file   • Years of education: Not on file   • Highest education level: Not on file   Tobacco Use   • Smoking status: Never Smoker   • Smokeless tobacco: Former User     Types: Chew   Substance and Sexual Activity   • Alcohol use: Yes     Comment: occasional   • Drug use: No   • Sexual activity: Defer       Allergies:  Allergies   Allergen Reactions   • Codeine Itching   • Morphine Itching       Immunizations:    There is no immunization history on file for this patient.    ROS:  ROS       Objective:         /76   Pulse 60   Ht 177.8 cm (70\")   Wt 99.8 kg (220 lb)   BMI 31.57 kg/m²     Physical Exam    In-Office Procedure(s):  Procedures    ASCVD RIsk Score::  The ASCVD Risk score (Ronal AVINA Jr., et al., 2013) failed to calculate for the following reasons:    The 2013 ASCVD risk score is only valid for ages 40 to 79    Recent Radiology:  Imaging Results (Most Recent)     None          Lab Review:   Results Encounter on 12/29/2020   Component Date Value   • Glucose 12/30/2020 98    • BUN 12/30/2020 27    • Creatinine 12/30/2020 1.48*   • eGFR Non African Am 12/30/2020 43*   • eGFR  Am 12/30/2020 50*   • BUN/Creatinine Ratio 12/30/2020 18    • Sodium 12/30/2020 140    • Potassium 12/30/2020 5.0    • Chloride 12/30/2020 105    • Total CO2 12/30/2020 25    • Calcium 12/30/2020 9.2    Results Encounter on 12/17/2020   Component Date Value   • Glucose 12/16/2020 94    • BUN 12/16/2020 24    • Creatinine 12/16/2020 1.62*   • eGFR Non African Am 12/16/2020 39*   • eGFR  Am 12/16/2020 45*   • BUN/Creatinine Ratio 12/16/2020 15    • Sodium 12/16/2020 139    • Potassium 12/16/2020 4.9    • Chloride 12/16/2020 102    • Total CO2 12/16/2020 25    • Calcium 12/16/2020 9.6    Orders Only on 12/01/2020   Component Date Value   • Glucose 12/01/2020 100*   • BUN 12/01/2020 23    • Creatinine 12/01/2020 1.56*   • eGFR Non African Am 12/01/2020 41*   • eGFR  Am 12/01/2020 47*   • " BUN/Creatinine Ratio 12/01/2020 15    • Sodium 12/01/2020 142    • Potassium 12/01/2020 4.7    • Chloride 12/01/2020 100    • Total CO2 12/01/2020 25    • Calcium 12/01/2020 8.9    • BMP 12/01/2020 Comment    Results Encounter on 08/19/2020   Component Date Value   • Glucose 08/28/2020 100*   • BUN 08/28/2020 28*   • Creatinine 08/28/2020 1.28*   • eGFR Non African Am 08/28/2020 52*   • eGFR  Am 08/28/2020 60    • BUN/Creatinine Ratio 08/28/2020 22    • Sodium 08/28/2020 140    • Potassium 08/28/2020 4.8    • Chloride 08/28/2020 100    • Total CO2 08/28/2020 28    • Calcium 08/28/2020 9.5                 Assessment:         No diagnosis found.       Plan:      Plan as above      Level of Care:                 Pardeep Melendez MD  01/14/21  .

## 2021-01-15 ENCOUNTER — TELEPHONE (OUTPATIENT)
Dept: CARDIOLOGY | Facility: CLINIC | Age: 81
End: 2021-01-15

## 2021-01-15 RX ORDER — AMLODIPINE BESYLATE AND BENAZEPRIL HYDROCHLORIDE 5; 20 MG/1; MG/1
1 CAPSULE ORAL DAILY
Qty: 90 CAPSULE | Refills: 1 | Status: SHIPPED | OUTPATIENT
Start: 2021-01-15 | End: 2021-07-29 | Stop reason: SDUPTHER

## 2021-01-15 NOTE — TELEPHONE ENCOUNTER
1/15/21--Dr Melendez  Pt: Ronni Mauricio  : 1940  Reason for Call: Mr Mauricio states that he had a phone visit with Dr Melendez 21. He states that Dr Melendez aid he was going to increase his amLODipine 5-10 to 5-20. Mr. Mauricio called this am because he said this has not been called in. He gets his meds at [facility]   P-  F-    I told him that Dr Melendez may have not signed it yet.   His number [phone]

## 2021-02-01 DIAGNOSIS — I48.0 AF (PAROXYSMAL ATRIAL FIBRILLATION) (HCC): ICD-10-CM

## 2021-02-03 RX ORDER — RIVAROXABAN 20 MG/1
TABLET, FILM COATED ORAL
Qty: 90 TABLET | Refills: 3 | Status: SHIPPED | OUTPATIENT
Start: 2021-02-03 | End: 2022-01-01

## 2021-02-09 RX ORDER — FUROSEMIDE 20 MG/1
20 TABLET ORAL DAILY
Qty: 90 TABLET | Refills: 1 | Status: SHIPPED | OUTPATIENT
Start: 2021-02-09 | End: 2021-08-02 | Stop reason: SDUPTHER

## 2021-03-23 RX ORDER — CARVEDILOL 3.12 MG/1
3.12 TABLET ORAL EVERY 12 HOURS SCHEDULED
Qty: 180 TABLET | Refills: 1 | Status: SHIPPED | OUTPATIENT
Start: 2021-03-23 | End: 2021-09-13

## 2021-03-23 NOTE — TELEPHONE ENCOUNTER
Al pt    Patient would like to know if he can get a 10 day supply of Carvedilol 3.125mg sent to [facility]? States he is completely out.    Pharmacy#[phone].

## 2021-04-01 RX ORDER — RANOLAZINE 500 MG/1
500 TABLET, EXTENDED RELEASE ORAL 2 TIMES DAILY
Qty: 60 TABLET | Refills: 5 | Status: SHIPPED | OUTPATIENT
Start: 2021-04-01 | End: 2021-09-21

## 2021-04-01 NOTE — TELEPHONE ENCOUNTER
Al pt     Pt needs a refill on his Ranexa 500mg sent to [facility] in [place].     Please have this done in the next 3 days, patient will be out of medication on 04/04.    Thanks,   Luís

## 2021-04-15 ENCOUNTER — TELEMEDICINE (OUTPATIENT)
Dept: CARDIOLOGY | Facility: CLINIC | Age: 81
End: 2021-04-15

## 2021-04-15 VITALS
WEIGHT: 220 LBS | HEART RATE: 60 BPM | BODY MASS INDEX: 31.5 KG/M2 | DIASTOLIC BLOOD PRESSURE: 65 MMHG | SYSTOLIC BLOOD PRESSURE: 126 MMHG | HEIGHT: 70 IN

## 2021-04-15 DIAGNOSIS — I48.0 AF (PAROXYSMAL ATRIAL FIBRILLATION) (HCC): Primary | ICD-10-CM

## 2021-04-15 DIAGNOSIS — I20.8 STABLE ANGINA PECTORIS (HCC): ICD-10-CM

## 2021-04-15 DIAGNOSIS — I51.89 DIASTOLIC DYSFUNCTION: ICD-10-CM

## 2021-04-15 DIAGNOSIS — I25.118 CORONARY ARTERY DISEASE OF NATIVE ARTERY OF NATIVE HEART WITH STABLE ANGINA PECTORIS (HCC): ICD-10-CM

## 2021-04-15 PROCEDURE — 99442 PR PHYS/QHP TELEPHONE EVALUATION 11-20 MIN: CPT | Performed by: INTERNAL MEDICINE

## 2021-04-15 NOTE — PROGRESS NOTES
Cardiology clinic telehealth note  Pardeep Melendez MD, PhD  Subjective:     Encounter Date:04/15/2021      Patient ID: Ronni Mauricio is a 80 y.o. male.    Chief Complaint:  Chief Complaint   Patient presents with   • Follow-up   Verbal consent obtained for telehealth visit today  Unable to complete visit using a video connection to the patient. A phone visit was used to complete this visits. Total time of discussion was 16 minutes, additional time and charting.    HPI:  History of Present Illness  previously I had the pleasure to see this very pleasant 79-year-old gentleman in follow-up today from hospitalization and catheterization.  He has preserved LV systolic function with significant coronary disease and small vessel disease in his LAD as well as diagonal branch and septal perforators too small for intervention as compared to the size of my 5 [place] diagnostic catheter best treated with medications, lipid-lowering therapy antihypertensives and antianginals.  He also had an ostial PDA lesion of 80% with FFR value of 0.96 not meeting hemodynamic significance.  Otherwise he had nonobstructive disease in the proximal RCA proximal LAD and proximal circumflex.  He had preserved LV systolic function with EF of at least 55 to 60%.  He had an abnormal stress test in the apical lateral portion likely owing to small caliber distal LAD that did not reach the apex as well as small caliber diagonal branch.  We have chosen medical therapy and he has had no chest pain since discharge.  His blood pressure today is 152/80 which is been his typical blood pressure and his heart rate is paced at 60.  He has a pacemaker for tachybradycardia syndrome that is in place.  He is on amlodipine benazepril 10/20 daily as well as aspirin 325 daily and rivaroxaban 20 mg daily for stroke risk reduction with paroxysmal atrial fibrillation.  He has not been on beta-blockade but he has a backup pacemaker and we previously started Coreg 3.125 mg  p.o. twice daily however he says he began to have chest pain and discontinue this medication.  However exertional chest pain continued even after discontinuation of this medicine.  He is fine at rest but says with exertion or 1 flight of stairs he gets very short of breath and gets chest heaviness which is progressed to chest pain.  We reviewed his catheterization films again today which shows small vessels peripherally most specifically in the LAD territory.  His blood pressure today is 148/68 and his heart rate is 70 we did discuss additional antianginals such as isosorbide mononitrate 30 daily which we will try.  Additionally will change Coreg to 25 of metoprolol XL daily as more beta-1 selective.  His weight is stable at 216 pounds and he is well compensated with no edema no heart failure     Today we had telehealth visit today to follow-up his edema and ongoing exertional angina as well as recent labs.  He says the chest pain is all but resolved although he still has some degree of exertional shortness of air which waxes and wanes as well as some mild pressure.  Blood pressure today is 130 systolic at goal on his present medical regimen.   Most recent creatinine improved to 1.4 from 1.6.  Overall he says he is doing better he says with volume is controlled, no significant edema which is stable and he is only on once daily Lasix now.  After discussion on potential revascularization with improvement of his symptoms on medical therapy we will continue to defer LAD revascularization with very small distal LAD size and acute angle bifurcation with diagonal branch which is also diseased.  His EF is underlying normal as documented above.  Left heart catheterization results previously as below.  He is tolerating Ranexa.  He is tolerating antiplatelets.  He has CCS class II angina, NYHA class II shortness of breath        Assessment/ plan   Coronary artery disease  Stable angina  CKD  Preserved LV systolic  function    Continue on antiplatelet therapy with Plavix today, stop aspirin  Continue Xarelto   Anti anginal therapy with Ranexa 500 mg p.o. b.i.d.  Continue long-acting nitrate  Back to clinic in 3 months for follow-up  He will call for clinic for any worsening in frequency severity or duration of chest pain we will proceed with stenting of the LAD with proximal optimization and improvement of downstream flow for anginal chest pain relief        Review of systems x14 point review of systems is negative except was mentioned above     Historical data copied for from previous encounters and EMR is unchanged      films from his heart catheterization lower reviewed again in person with the patient today with identification of distal RCA stenosis which was FFR negative previously as well as distal LAD with difficult anatomy conducive to long-term patency is small caliber stents.     Left heart catheterization 7-9-2020  Physicians      Panel Physicians Referring Physician Case Authorizing Physician   Pardeep Melendez MD (Primary)   Deam, Kory Mcfadden MD   Indications              Dyspnea on exertion [R06.00 (ICD-10-CM)]   Abnormal nuclear stress test [R94.39 (ICD-10-CM)]   Paroxysmal atrial fibrillation (CMS/HCC) [I48.0 (ICD-10-CM)]           Conclusion       Pardeep Melendez MD, PhD  [facility]  Date of service 7-9-2020     Procedure  1.  Left heart catheterization with coronary angiography left ventriculography via right radial approach  2.  Fractional flow reserve of the distal RCA bifurcation into PDA with intracoronary adenosine for maximal hyperemia and trans-stenosis gradient assessment     After informed consent the patient was brought to the catheterization lab sterilely prepped and draped in usual fashion with exposure the right wrist for right radial access via micropuncture modified Seldinger technique with placement a 5/6 slender sheath to the radial artery under  fluoroscopic guidance which was aspirated flushed with heparinized saline.  Standard cocktail was administered with heparin nitroglycerin and verapamil intra-arterially.  An 035 guidewire was advanced to the level aortic valve followed by JL 3 and JR4 diagnostic catheters for selective left and right coronary angiography respectively.  The JR4 was used across the aortic valve followed by exchange for a exchange length J-wire and placement of a Ryan pigtail catheter across the aortic valve for transaortic valve pressure gradient assessment given history of moderate aortic stenosis and subjective shortness of air and dyspnea on exertion.  After trans-valvular gradient assessment as well as EDP assessment hand-injection of 8 cc of contrast was used for left-ventricular Griffey in FLORES position.  There was a pullback revealed no significant drift and all catheters and equipment were ultimately removed.  With borderline obstructive CAD at the ostial PDA decision was made for FFR given abnormal stress on this location with diminished counts in the inferior inferolateral wall.  There was MIRNA-3 flow throughout the RCA preprocedurally.  After heparinization with 70 units/kg of heparin for ACT greater than 275 a 6 Thai JR4 guide was used to engage the ostium of the RCA followed by standard FFR with equalization proximally and pressure gradient assessment and fractional flow reserve with intracoronary 130 mcg of adenosine injected followed by FFR measurement.  Lowest FFR value of 0.96 not hemodynamically significant.  The wire was then pulled back showing no significant drift and then the catheter and wire were ultimately removed and the sheath flushed with heparinized saline.  Patient tolerated the procedure well with no complications.  He left the Cath Lab chest pain-free hemodynamically electrically stable alert talking to staff neurologically grossly intact.  TR band was placed in the Cath Lab with immediate  hemostasis     Complications none  Blood loss less than 10 cc  Contrast used is less than 120 cc  Moderate conscious sedation time of 1 hour  Conscious sedation was utilized with IV Versed and fentanyl administered by registered nurse with continuous ECG pulse oximeter hematuric monitor throughout the entirety the case supervised by myself     Findings  1.  Opening aortic pressure 152/76  2.  Closing pressure unchanged  3.  LV pressure 140/9 with an EDP of 15 mmHg  4.  Trans-aortic valve gradient 12 to 14 mmHg peak to peak gradient  5.  Preserved LV systolic function EF is 60%     Angiography  1.  Left main is a medium large caliber vessel giving rise to LAD and nondominant circumflex.  There is distal left main 30% narrowing only  2.  The LAD is a medium caliber vessel with proximal and mid diffuse luminal irregularities nothing greater than 30%.  The distal LAD essentially has dual LAD physiology with bifurcation into a continuation LAD which does not reach the apex and gives off septal perforators as well as immediate takeoff of a bifurcating diagonal branch that courses to the apical lateral and anterolateral wall.  There is no obstructive disease of this bifurcating distal diagonal branch.  The continuation LAD is small in size likely 2 mm or less with proximal 70% disease but again very small caliber portion of the vessel likely best treated medically given small caliber and MIRNA-3 flow.  The first diagonal branch has ostial 30% narrowing only and only diffuse luminal regularities after and then bifurcates distally with no obstructive disease after the proximal portion.  MIRNA-3 flow throughout the LAD distribution  3.  The circumflex is a medium caliber nondominant vessel with continuation circumflex, and small obtuse marginal branches with limited course along the lateral wall.  There is no obstructive disease in the circumflex distribution greater than 30%.  4.  The RCA is a large-caliber dominant vessel  with ostial 30% narrowing.  The proximal mid and distal portion have diffuse luminal irregularities nothing greater than 30%.  The distal RCA at the bifurcation narrows to approximately 60% followed by takeoff of a medium caliber large PLV branch which trifurcates distally and perfuses the inferolateral wall but has MIRNA-3 flow.  The ostium of this PLV branch is widely patent with less than 30% narrowing.  The ostium of the PDA branch has 70 perhaps 80% stenosis and no obstructive disease thereafter.  FFR of the distal RCA into PDA branch representing the tightest stenosis was hemodynamically insignificant at 0.96 best treated medically per guidelines     Insignificant FFR value 0.96 of the distal RCA into RPDA branch.     Conclusions and recommendations     Abnormal stress location was in the inferior and inferolateral wall however FFR is insignificant and there is no obstructive disease from the RCA into PLV branch.  Abnormal stress location of the apical lateral portion likely owing to small caliber distal LAD which is again best treated medically given small size and low likelihood of long-term patency with small caliber stent in this location as well as possible complicated placement noting trifurcation of this portion of the LAD..  Preserved LV systolic function  Slightly high LVEDP at 15 mmHg  No significant transaortic valve gradient, mild to moderate hemodynamic aortic stenosis maximally by [place] pigtail assessment of simultaneous transvalvular gradients.  Medical management with aspirin and statin, beta-blocker as tolerated with pacemaker in place for tachybradycardia syndrome with atrial sensed ventricular paced rhythm presently with history of paroxysmal atrial fibrillation  Diet and exercise per AHA guidelines  Restart anticoagulation tomorrow is okay  Restrictions discussed with respect to lifting with right arm status post radial access  Follow-up in clinic in 2 to 4 weeks with cardiology     It is  a pleasure to be involved in his cardiovascular care.  Please call with any questions or concerns  Pardeep Melendez MD, PhD          The following portions of the patient's history were reviewed and updated as appropriate: allergies, current medications, past family history, past medical history, past social history, past surgical history and problem list.    Problem List:  Patient Active Problem List   Diagnosis   • Benign essential HTN   • Sick sinus syndrome (CMS/HCC)   • Presence of cardiac pacemaker   • Heart murmur   • Aortic stenosis, moderate   • Diastolic dysfunction   • Dyspnea on exertion   • Abnormal nuclear stress test   • Paroxysmal atrial fibrillation (CMS/HCC)   • Coronary artery disease of native artery of native heart with stable angina pectoris (CMS/HCC)   • Stable angina pectoris (CMS/HCC)   • Complete heart block (CMS/HCC)       Past Medical History:  Past Medical History:   Diagnosis Date   • Atrial fibrillation (CMS/HCC)    • Benign essential HTN    • Pacemaker    • Sick sinus syndrome (CMS/HCC)        Past Surgical History:  Past Surgical History:   Procedure Laterality Date   • CARDIAC CATHETERIZATION N/A 7/9/2020    Procedure: Left Heart Cath;  Surgeon: Pardeep Melendez MD;  Location: [facility] CATH INVASIVE LOCATION;  Service: Cardiovascular;  Laterality: N/A;   • CARDIAC CATHETERIZATION N/A 7/9/2020    Procedure: Coronary angiography;  Surgeon: Pardeep Melendez MD;  Location: [facility] CATH INVASIVE LOCATION;  Service: Cardiovascular;  Laterality: N/A;   • CARDIAC CATHETERIZATION N/A 7/9/2020    Procedure: Left ventriculography;  Surgeon: Pardeep Melendez MD;  Location: [facility] CATH INVASIVE LOCATION;  Service: Cardiovascular;  Laterality: N/A;   • CARDIAC CATHETERIZATION  7/9/2020    Procedure: Functional Flow [place];  Surgeon: Pardeep Melendez MD;  Location: [facility] CATH INVASIVE LOCATION;  Service: Cardiovascular;;   • CARDIAC ELECTROPHYSIOLOGY PROCEDURE N/A  "5/22/2020    Procedure: PPM battery change. [facility] change out;  Surgeon: Kory Corrales MD;  Location: [facility] INVASIVE LOCATION;  Service: Cardiology;  Laterality: N/A;   • INSERT / REPLACE / REMOVE PACEMAKER      [facility]   • PACEMAKER REPLACEMENT  05/22/2020    [facility]    • SINUS SURGERY         Social History:  Social History     Socioeconomic History   • Marital status:      Spouse name: Not on file   • Number of children: Not on file   • Years of education: Not on file   • Highest education level: Not on file   Tobacco Use   • Smoking status: Never Smoker   • Smokeless tobacco: Former User     Types: Chew   Substance and Sexual Activity   • Alcohol use: Yes     Comment: occasional   • Drug use: No   • Sexual activity: Defer       Allergies:  Allergies   Allergen Reactions   • Codeine Itching   • Morphine Itching       Immunizations:    There is no immunization history on file for this patient.    ROS:  ROS       Objective:         /65   Pulse 60   Ht 177.8 cm (70\")   Wt 99.8 kg (220 lb)   BMI 31.57 kg/m²     Physical Exam  Exam possible today  In-Office Procedure(s):  Procedures    ASCVD RIsk Score::  The ASCVD Risk score (Ronal DC Jr., et al., 2013) failed to calculate for the following reasons:    The 2013 ASCVD risk score is only valid for ages 40 to 79    Recent Radiology:  Imaging Results (Most Recent)     None          Lab Review:   Results Encounter on 12/29/2020   Component Date Value   • Glucose 12/30/2020 98    • BUN 12/30/2020 27    • Creatinine 12/30/2020 1.48*   • eGFR Non African Am 12/30/2020 43*   • eGFR  Am 12/30/2020 50*   • BUN/Creatinine Ratio 12/30/2020 18    • Sodium 12/30/2020 140    • Potassium 12/30/2020 5.0    • Chloride 12/30/2020 105    • Total CO2 12/30/2020 25    • Calcium 12/30/2020 9.2    Results Encounter on 12/17/2020   Component Date Value   • Glucose 12/16/2020 94    • BUN 12/16/2020 24    • Creatinine 12/16/2020 1.62*   • eGFR Non "  Am 12/16/2020 39*   • eGFR  Am 12/16/2020 45*   • BUN/Creatinine Ratio 12/16/2020 15    • Sodium 12/16/2020 139    • Potassium 12/16/2020 4.9    • Chloride 12/16/2020 102    • Total CO2 12/16/2020 25    • Calcium 12/16/2020 9.6    Orders Only on 12/01/2020   Component Date Value   • Glucose 12/01/2020 100*   • BUN 12/01/2020 23    • Creatinine 12/01/2020 1.56*   • eGFR Non African Am 12/01/2020 41*   • eGFR  Am 12/01/2020 47*   • BUN/Creatinine Ratio 12/01/2020 15    • Sodium 12/01/2020 142    • Potassium 12/01/2020 4.7    • Chloride 12/01/2020 100    • Total CO2 12/01/2020 25    • Calcium 12/01/2020 8.9    • BMP 12/01/2020 Comment                      Pardeep Melendez MD  04/15/21  .

## 2021-05-06 RX ORDER — ISOSORBIDE MONONITRATE 30 MG/1
30 TABLET, EXTENDED RELEASE ORAL DAILY
Qty: 90 TABLET | Refills: 1 | Status: SHIPPED | OUTPATIENT
Start: 2021-05-06 | End: 2021-09-21

## 2021-05-06 RX ORDER — ATORVASTATIN CALCIUM 20 MG/1
20 TABLET, FILM COATED ORAL DAILY
Qty: 90 TABLET | Refills: 1 | Status: SHIPPED | OUTPATIENT
Start: 2021-05-06 | End: 2021-11-01

## 2021-05-06 NOTE — TELEPHONE ENCOUNTER
"K  Pt called requesting refills of \"atorvastatin (LIPITOR) 20 MG tablet and isosorbide mononitrate (IMDUR) 30 MG 24 hr tablet\" (q 90 day w/ refills) both sent to [facility] mail order please    Pt cb #[phone]   "

## 2021-07-29 ENCOUNTER — OFFICE VISIT (OUTPATIENT)
Dept: CARDIOLOGY | Facility: CLINIC | Age: 81
End: 2021-07-29

## 2021-07-29 VITALS
DIASTOLIC BLOOD PRESSURE: 62 MMHG | BODY MASS INDEX: 31.67 KG/M2 | HEART RATE: 68 BPM | HEIGHT: 70 IN | SYSTOLIC BLOOD PRESSURE: 116 MMHG | WEIGHT: 221.2 LBS | RESPIRATION RATE: 18 BRPM

## 2021-07-29 DIAGNOSIS — R01.1 HEART MURMUR: Primary | ICD-10-CM

## 2021-07-29 DIAGNOSIS — I10 BENIGN ESSENTIAL HTN: ICD-10-CM

## 2021-07-29 PROCEDURE — 99214 OFFICE O/P EST MOD 30 MIN: CPT | Performed by: INTERNAL MEDICINE

## 2021-07-29 RX ORDER — AMLODIPINE BESYLATE AND BENAZEPRIL HYDROCHLORIDE 5; 20 MG/1; MG/1
1 CAPSULE ORAL DAILY
Qty: 90 CAPSULE | Refills: 3 | Status: SHIPPED | OUTPATIENT
Start: 2021-07-29 | End: 2022-01-01

## 2021-07-29 NOTE — PROGRESS NOTES
Cardiology clinic note  Pardeep Melendez MD, PhD  Subjective:     Encounter Date:07/29/2021      Patient ID: Ronni Mauricio is a 80 y.o. male.    Chief Complaint:  Chief Complaint   Patient presents with   • Follow-up   Coronary artery disease  Chronic stable angina  Diastolic congestive heart failure chronically  Hyperlipidemia  Essential hypertension    HPI:  History of Present Illness  History of Present Illness  previously I had the pleasure to see this very pleasant 79-year-old gentleman in follow-up today from hospitalization and catheterization.  He has preserved LV systolic function with significant coronary disease and small vessel disease in his LAD as well as diagonal branch and septal perforators too small for intervention as compared to the size of my 5 [place] diagnostic catheter best treated with medications, lipid-lowering therapy antihypertensives and antianginals.  He also had an ostial PDA lesion of 80% with FFR value of 0.96 not meeting hemodynamic significance.  Otherwise he had nonobstructive disease in the proximal RCA proximal LAD and proximal circumflex.  He had preserved LV systolic function with EF of at least 55 to 60%.  He had an abnormal stress test in the apical lateral portion likely owing to small caliber distal LAD that did not reach the apex as well as small caliber diagonal branch.  We have chosen medical therapy and he has had no chest pain since discharge.  His blood pressure today is 152/80 which is been his typical blood pressure and his heart rate is paced at 60.  He has a pacemaker for tachybradycardia syndrome that is in place.  He is on amlodipine benazepril 10/20 daily as well as aspirin 325 daily and rivaroxaban 20 mg daily for stroke risk reduction with paroxysmal atrial fibrillation.  He has not been on beta-blockade but he has a backup pacemaker and we previously started Coreg 3.125 mg p.o. twice daily however he says he began to have chest pain and discontinue this  medication.  However exertional chest pain continued even after discontinuation of this medicine.  He is fine at rest but says with exertion or 1 flight of stairs he gets very short of breath and gets chest heaviness which is progressed to chest pain.  We reviewed his catheterization films again today which shows small vessels peripherally most specifically in the LAD territory.  His blood pressure today is 148/68 and his heart rate is 70 we did discuss additional antianginals such as isosorbide mononitrate 30 daily which we will try.  Additionally will change Coreg to 25 of metoprolol XL daily as more beta-1 selective.  His weight is stable at 216 pounds and he is well compensated with no edema no heart failure     Today we in person visit, he does not display any anginal chest pain but has dyspnea on exertion NYHA class II symptoms after significant exertion.  He simply stops place for now and then can resume his activity.  His blood pressure is good today at 1 30-1 40 systolic, he denies any palpitations dizziness diaphoresis.  No recent febrile illnesses.  He appears well compensated but has some mild peripheral edema trace to +1 but says this is chronic and constant.  He has had no resting chest pain or refractory chest pain to a single dose nitroglycerin.  He is otherwise well compensated with no PND orthopnea.  Similar to prior encounters, after discussion on potential revascularization with improvement of his symptoms on medical therapy we will continue to defer LAD revascularization with very small distal LAD size and acute angle bifurcation with diagonal branch which is also diseased.  His EF is underlying normal as documented above.  Left heart catheterization results previously as below.  He is tolerating Ranexa.  He is tolerating antiplatelets.  He has CCS class II angina, NYHA class II shortness of breath.    He remains well compensated, clinically unchanged, will continue to defer invasive evaluation  at this time in lieu of goal-directed medical therapy, he is tolerating Plavix and Xarelto    Regular rate and rhythm  2 out of 6 crescendo systolic murmur, soft in quality  No clubbing or cyanosis, 1+ pitting edema at the ankles  Radial pulses bounding 2+ with no delayed  No carotid bruits no JVD  Normocephalic/atraumatic pupils equal round  No heave no lift  No S3-S4          Assessment/ plan   Coronary artery disease  Stable angina  CKD  Preserved LV systolic function  Moderate aortic valve stenosis     Continue on antiplatelet therapy with Plavix today, stop aspirin  Continue Xarelto   Anti anginal therapy with Ranexa 500 mg p.o. b.i.d.  Continue long-acting nitrate  Back to clinic in 3-6 months for follow-up  He will call for clinic for any worsening in frequency severity or duration of chest pain we will proceed with stenting of the LAD with proximal optimization and improvement of downstream flow for anginal chest pain relief  Get 2D echo for structural and functional evaluation, reevaluation of aortic valve stenosis severity given shortness of breath and dyspnea on exertion, no echo in the last 18 to 24 months, per guidelines needs repeat every 1 to 2 years     Review of systems x14 point review of systems is negative except was mentioned above     Historical data copied for from previous encounters and EMR is unchanged      films from his heart catheterization lower reviewed again in person with the patient today with identification of distal RCA stenosis which was FFR negative previously as well as distal LAD with difficult anatomy conducive to long-term patency is small caliber stents.     Left heart catheterization 7-9-2020  Physicians      Panel Physicians Referring Physician Case Authorizing Physician   Pardeep Melendez MD (Primary)   Deam, Kory Mcfadden MD   Indications              Dyspnea on exertion [R06.00 (ICD-10-CM)]   Abnormal nuclear stress test [R94.39 (ICD-10-CM)]   Paroxysmal  atrial fibrillation (CMS/[facility]) [I48.0 (ICD-10-CM)]           Conclusion       Pardeep Melendez MD, PhD  [facility] cardiology  Date of service 7-9-2020     Procedure  1.  Left heart catheterization with coronary angiography left ventriculography via right radial approach  2.  Fractional flow reserve of the distal RCA bifurcation into PDA with intracoronary adenosine for maximal hyperemia and trans-stenosis gradient assessment     After informed consent the patient was brought to the catheterization lab sterilely prepped and draped in usual fashion with exposure the right wrist for right radial access via micropuncture modified Seldinger technique with placement a 5/6 slender sheath to the radial artery under fluoroscopic guidance which was aspirated flushed with heparinized saline.  Standard cocktail was administered with heparin nitroglycerin and verapamil intra-arterially.  An 035 guidewire was advanced to the level aortic valve followed by JL 3 and JR4 diagnostic catheters for selective left and right coronary angiography respectively.  The JR4 was used across the aortic valve followed by exchange for a exchange length J-wire and placement of a Ryan pigtail catheter across the aortic valve for transaortic valve pressure gradient assessment given history of moderate aortic stenosis and subjective shortness of air and dyspnea on exertion.  After trans-valvular gradient assessment as well as EDP assessment hand-injection of 8 cc of contrast was used for left-ventricular Griffey in FLORES position.  There was a pullback revealed no significant drift and all catheters and equipment were ultimately removed.  With borderline obstructive CAD at the ostial PDA decision was made for FFR given abnormal stress on this location with diminished counts in the inferior inferolateral wall.  There was MIRNA-3 flow throughout the RCA preprocedurally.  After heparinization with 70 units/kg of heparin for ACT greater  than 275 a 6 Irish JR4 guide was used to engage the ostium of the RCA followed by standard FFR with equalization proximally and pressure gradient assessment and fractional flow reserve with intracoronary 130 mcg of adenosine injected followed by FFR measurement.  Lowest FFR value of 0.96 not hemodynamically significant.  The wire was then pulled back showing no significant drift and then the catheter and wire were ultimately removed and the sheath flushed with heparinized saline.  Patient tolerated the procedure well with no complications.  He left the Cath Lab chest pain-free hemodynamically electrically stable alert talking to staff neurologically grossly intact.  TR band was placed in the Cath Lab with immediate hemostasis     Complications none  Blood loss less than 10 cc  Contrast used is less than 120 cc  Moderate conscious sedation time of 1 hour  Conscious sedation was utilized with IV Versed and fentanyl administered by registered nurse with continuous ECG pulse oximeter hematuric monitor throughout the entirety the case supervised by myself     Findings  1.  Opening aortic pressure 152/76  2.  Closing pressure unchanged  3.  LV pressure 140/9 with an EDP of 15 mmHg  4.  Trans-aortic valve gradient 12 to 14 mmHg peak to peak gradient  5.  Preserved LV systolic function EF is 60%     Angiography  1.  Left main is a medium large caliber vessel giving rise to LAD and nondominant circumflex.  There is distal left main 30% narrowing only  2.  The LAD is a medium caliber vessel with proximal and mid diffuse luminal irregularities nothing greater than 30%.  The distal LAD essentially has dual LAD physiology with bifurcation into a continuation LAD which does not reach the apex and gives off septal perforators as well as immediate takeoff of a bifurcating diagonal branch that courses to the apical lateral and anterolateral wall.  There is no obstructive disease of this bifurcating distal diagonal branch.  The  continuation LAD is small in size likely 2 mm or less with proximal 70% disease but again very small caliber portion of the vessel likely best treated medically given small caliber and MIRNA-3 flow.  The first diagonal branch has ostial 30% narrowing only and only diffuse luminal regularities after and then bifurcates distally with no obstructive disease after the proximal portion.  MIRNA-3 flow throughout the LAD distribution  3.  The circumflex is a medium caliber nondominant vessel with continuation circumflex, and small obtuse marginal branches with limited course along the lateral wall.  There is no obstructive disease in the circumflex distribution greater than 30%.  4.  The RCA is a large-caliber dominant vessel with ostial 30% narrowing.  The proximal mid and distal portion have diffuse luminal irregularities nothing greater than 30%.  The distal RCA at the bifurcation narrows to approximately 60% followed by takeoff of a medium caliber large PLV branch which trifurcates distally and perfuses the inferolateral wall but has MIRNA-3 flow.  The ostium of this PLV branch is widely patent with less than 30% narrowing.  The ostium of the PDA branch has 70 perhaps 80% stenosis and no obstructive disease thereafter.  FFR of the distal RCA into PDA branch representing the tightest stenosis was hemodynamically insignificant at 0.96 best treated medically per guidelines     Insignificant FFR value 0.96 of the distal RCA into RPDA branch.     Conclusions and recommendations     Abnormal stress location was in the inferior and inferolateral wall however FFR is insignificant and there is no obstructive disease from the RCA into PLV branch.  Abnormal stress location of the apical lateral portion likely owing to small caliber distal LAD which is again best treated medically given small size and low likelihood of long-term patency with small caliber stent in this location as well as possible complicated placement noting  trifurcation of this portion of the LAD..  Preserved LV systolic function  Slightly high LVEDP at 15 mmHg  No significant transaortic valve gradient, mild to moderate hemodynamic aortic stenosis maximally by Ryan pigtail assessment of simultaneous transvalvular gradients.  Medical management with aspirin and statin, beta-blocker as tolerated with pacemaker in place for tachybradycardia syndrome with atrial sensed ventricular paced rhythm presently with history of paroxysmal atrial fibrillation  Diet and exercise per AHA guidelines  Restart anticoagulation tomorrow is okay  Restrictions discussed with respect to lifting with right arm status post radial access  Follow-up in clinic in 2 to 4 weeks with cardiology     It is a pleasure to be involved in his cardiovascular care.  Please call with any questions or concerns  Pardeep Melendez MD, PhD          The following portions of the patient's history were reviewed and updated as appropriate: allergies, current medications, past family history, past medical history, past social history, past surgical history and problem list.    Problem List:  Patient Active Problem List   Diagnosis   • Benign essential HTN   • Sick sinus syndrome (CMS/HCC)   • Presence of cardiac pacemaker   • Heart murmur   • Aortic stenosis, moderate   • Diastolic dysfunction   • Dyspnea on exertion   • Abnormal nuclear stress test   • Paroxysmal atrial fibrillation (CMS/HCC)   • Coronary artery disease of native artery of native heart with stable angina pectoris (CMS/HCC)   • Stable angina pectoris (CMS/HCC)   • Complete heart block (CMS/HCC)       Past Medical History:  Past Medical History:   Diagnosis Date   • Atrial fibrillation (CMS/HCC)    • Benign essential HTN    • Pacemaker    • Sick sinus syndrome (CMS/HCC)        Past Surgical History:  Past Surgical History:   Procedure Laterality Date   • CARDIAC CATHETERIZATION N/A 7/9/2020    Procedure: Left Heart Cath;  Surgeon: Pardeep Melendez  "Uche Sheppard MD;  Location:  [facility] CATH INVASIVE LOCATION;  Service: Cardiovascular;  Laterality: N/A;   • CARDIAC CATHETERIZATION N/A 7/9/2020    Procedure: Coronary angiography;  Surgeon: Pardeep Melendez MD;  Location: [facility] CATH INVASIVE LOC[facility];  Service: Cardiovascular;  Laterality: N/A;   • CARDIAC CATHETERIZATION N/A 7/9/2020    Procedure: Left ventriculography;  Surgeon: Pardeep Melendez MD;  Location[facility];  Service: Cardiovascular;  Laterality: N/A;   • CARDIAC CATHETERIZATION  7/9/2020    Procedure: Functional Flow [place];  Surgeon: Pardeep Melendez MD;  Location: [facility] CATH [facility] LOC[facility];  Service: Cardiovascular;;   • CARDIAC ELECTROPHYSIOLOGY PROCEDURE N/A 5/22/2020    Procedure: PPM battery change. [facility] change out;  Surgeon: Kory Corrales MD;  Location: [facility] CATH INVASIVE LOCATION;  Service: Cardiology;  Laterality: N/A;   • INSERT / REPLACE / REMOVE PACEMAKER      [facility]   • PACEMAKER REPLACEMENT  05/22/2020    [facility]    • SINUS SURGERY         Social History:  Social History     Socioeconomic History   • Marital status:      Spouse name: Not on file   • Number of children: Not on file   • Years of education: Not on file   • Highest education level: Not on file   Tobacco Use   • Smoking status: Never Smoker   • Smokeless tobacco: Former User     Types: Chew   Substance and Sexual Activity   • Alcohol use: Yes     Comment: occasional   • Drug use: No   • Sexual activity: Defer       Allergies:  Allergies   Allergen Reactions   • Codeine Itching   • Morphine Itching       Immunizations:    There is no immunization history on file for this patient.    ROS:  ROS       Objective:         /62 (BP Location: Left arm, Patient Position: Sitting)   Pulse 68   Resp 18   Ht 177.8 cm (70\")   Wt 100 kg (221 lb 3.2 oz)   BMI 31.74 kg/m²     Physical Exam    In-Office Procedure(s):  Procedures    ASCVD RIsk Score::  The " ASCVD Risk score ([place] FABRICE Merida., et al., 2013) failed to calculate for the following reasons:    The 2013 ASCVD risk score is only valid for ages 40 to 79    Recent Radiology:  Imaging Results (Most Recent)     None          Lab Review:   No visits with results within 6 Month(s) from this visit.   Latest known visit with results is:   Results Encounter on 12/29/2020   Component Date Value   • Glucose 12/30/2020 98    • BUN 12/30/2020 27    • Creatinine 12/30/2020 1.48*   • eGFR Non African Am 12/30/2020 43*   • eGFR  Am 12/30/2020 50*   • BUN/Creatinine Ratio 12/30/2020 18    • Sodium 12/30/2020 140    • Potassium 12/30/2020 5.0    • Chloride 12/30/2020 105    • Total CO2 12/30/2020 25    • Calcium 12/30/2020 9.2                 Assessment:          Diagnosis Plan   1. Heart murmur  Adult Transthoracic Echo Complete W/ Cont if Necessary Per Protocol   2. Benign essential HTN  Comprehensive Metabolic Panel          Plan:             Pardeep Melendez MD  07/29/21  .   wheel chair

## 2021-08-02 ENCOUNTER — TELEPHONE (OUTPATIENT)
Dept: CARDIOLOGY | Facility: CLINIC | Age: 81
End: 2021-08-02

## 2021-08-02 RX ORDER — FUROSEMIDE 20 MG/1
20 TABLET ORAL DAILY
Qty: 90 TABLET | Refills: 1 | Status: SHIPPED | OUTPATIENT
Start: 2021-08-02 | End: 2022-01-01

## 2021-08-02 RX ORDER — FUROSEMIDE 20 MG/1
TABLET ORAL
Qty: 90 TABLET | Refills: 1 | OUTPATIENT
Start: 2021-08-02

## 2021-08-02 NOTE — TELEPHONE ENCOUNTER
"MCK  Pt called needing refills of \"furosemide (LASIX) 20 MG tablet\" sent to [facility] mail order (q90 day w/ refills) please    Pt cb# [phone]   "

## 2021-08-07 NOTE — TELEPHONE ENCOUNTER
Order already faxed over to [facility]. Spoke with patient. He does not need a visit prior to 1/14    TEAM Surgery Progress Note    INTERVAL EVENTS:   - No acute events overnight.    SUBJECTIVE: Patient seen and examined at bedside with surgical team,     OBJECTIVE:    Vital Signs Last 24 Hrs  T(C): 36.4 (06 Aug 2021 22:30), Max: 37.2 (06 Aug 2021 17:45)  T(F): 97.5 (06 Aug 2021 22:30), Max: 99 (06 Aug 2021 17:45)  HR: 102 (06 Aug 2021 23:00) (76 - 104)  BP: 140/98 (06 Aug 2021 22:30) (116/45 - 179/81)  BP(mean): 108 (06 Aug 2021 22:30) (55 - 108)  RR: 26 (06 Aug 2021 23:00) (15 - 47)  SpO2: 92% (06 Aug 2021 23:00) (90% - 98%)I&O's Detail    06 Aug 2021 07:01  -  07 Aug 2021 00:27  --------------------------------------------------------  IN:    IV PiggyBack: 275 mL    Lactated Ringers: 300 mL  Total IN: 575 mL    OUT:    Bulb (mL): 120 mL    Indwelling Catheter - Urethral (mL): 980 mL    Nasogastric/Oral tube (mL): 250 mL  Total OUT: 1350 mL    Total NET: -775 mL      MEDICATIONS  (STANDING):  acetaminophen  IVPB .. 1000 milliGRAM(s) IV Intermittent every 6 hours  heparin   Injectable 5000 Unit(s) SubCutaneous every 8 hours  HYDROmorphone PCA (1 mG/mL) 30 milliLiter(s) PCA Continuous PCA Continuous  lactated ringers. 1000 milliLiter(s) (100 mL/Hr) IV Continuous <Continuous>  metoprolol tartrate Injectable 5 milliGRAM(s) IV Push every 6 hours  morphine PF Spinal 0.2 milliGRAM(s) IntraThecal. once  pantoprazole  Injectable 40 milliGRAM(s) IV Push daily  piperacillin/tazobactam IVPB.. 3.375 Gram(s) IV Intermittent every 8 hours    MEDICATIONS  (PRN):  HYDROmorphone PCA (1 mG/mL) Rescue Clinician Bolus 0.5 milliGRAM(s) IV Push every 15 minutes PRN for Pain Scale GREATER THAN 6  naloxone Injectable 0.1 milliGRAM(s) IV Push every 3 minutes PRN For ANY of the following changes in patient status:  A. RR LESS THAN 10 breaths per minute, B. Oxygen saturation LESS THAN 90%, C. Sedation score of 6  ondansetron Injectable 4 milliGRAM(s) IV Push every 6 hours PRN Nausea  ondansetron Injectable 4 milliGRAM(s) IV Push every 6 hours PRN Nausea      PHYSICAL EXAM:  Constitutional: A&Ox3, NAD  Respiratory: Unlabored breathing  Abdomen: Soft, nondistended, NTTP. No rebound or guarding.  Extremities: WWP, RICHTER spontaneously    LABS:                        11.5   11.16 )-----------( 250      ( 06 Aug 2021 18:36 )             33.6     08-06    139  |  103  |  13  ----------------------------<  149<H>  4.1   |  16<L>  |  0.85    Ca    9.3      06 Aug 2021 18:36  Phos  3.7     08-06  Mg     1.60     08-06    TPro  6.6  /  Alb  4.2  /  TBili  2.6<H>  /  DBili  x   /  AST  468<H>  /  ALT  388<H>  /  AlkPhos  56  08-06    PT/INR - ( 06 Aug 2021 18:36 )   PT: 15.3 sec;   INR: 1.36 ratio         PTT - ( 06 Aug 2021 18:36 )  PTT:24.1 sec  LIVER FUNCTIONS - ( 06 Aug 2021 18:36 )  Alb: 4.2 g/dL / Pro: 6.6 g/dL / ALK PHOS: 56 U/L / ALT: 388 U/L / AST: 468 U/L / GGT: x             ABO Interpretation: A (08-06-21 @ 07:26)      IMAGING:      TEAM Surgery Progress Note    INTERVAL EVENTS:   - No acute events overnight.    SUBJECTIVE: Patient seen and examined at bedside with surgical team, Patient is doing well. NG tube was flushed, continuing to put out, some nausea this AM.    OBJECTIVE:    Vital Signs Last 24 Hrs  T(C): 36.4 (06 Aug 2021 22:30), Max: 37.2 (06 Aug 2021 17:45)  T(F): 97.5 (06 Aug 2021 22:30), Max: 99 (06 Aug 2021 17:45)  HR: 102 (06 Aug 2021 23:00) (76 - 104)  BP: 140/98 (06 Aug 2021 22:30) (116/45 - 179/81)  BP(mean): 108 (06 Aug 2021 22:30) (55 - 108)  RR: 26 (06 Aug 2021 23:00) (15 - 47)  SpO2: 92% (06 Aug 2021 23:00) (90% - 98%)I&O's Detail    I&O's Detail    06 Aug 2021 07:01  -  07 Aug 2021 07:00  --------------------------------------------------------  IN:    IV PiggyBack: 512.5 mL    Lactated Ringers: 1000 mL  Total IN: 1512.5 mL    OUT:    Bulb (mL): 135 mL    Indwelling Catheter - Urethral (mL): 1500 mL    Nasogastric/Oral tube (mL): 600 mL  Total OUT: 2235 mL    Total NET: -722.5 mL      07 Aug 2021 07:01  -  07 Aug 2021 11:05  --------------------------------------------------------  IN:    Lactated Ringers: 200 mL  Total IN: 200 mL    OUT:    Indwelling Catheter - Urethral (mL): 240 mL  Total OUT: 240 mL    Total NET: -40 mL          MEDICATIONS  (STANDING):  acetaminophen  IVPB .. 1000 milliGRAM(s) IV Intermittent every 6 hours  heparin   Injectable 5000 Unit(s) SubCutaneous every 8 hours  HYDROmorphone PCA (1 mG/mL) 30 milliLiter(s) PCA Continuous PCA Continuous  lactated ringers. 1000 milliLiter(s) (100 mL/Hr) IV Continuous <Continuous>  metoprolol tartrate Injectable 5 milliGRAM(s) IV Push every 6 hours  morphine PF Spinal 0.2 milliGRAM(s) IntraThecal. once  pantoprazole  Injectable 40 milliGRAM(s) IV Push daily  piperacillin/tazobactam IVPB.. 3.375 Gram(s) IV Intermittent every 8 hours    MEDICATIONS  (PRN):  HYDROmorphone PCA (1 mG/mL) Rescue Clinician Bolus 0.5 milliGRAM(s) IV Push every 15 minutes PRN for Pain Scale GREATER THAN 6  naloxone Injectable 0.1 milliGRAM(s) IV Push every 3 minutes PRN For ANY of the following changes in patient status:  A. RR LESS THAN 10 breaths per minute, B. Oxygen saturation LESS THAN 90%, C. Sedation score of 6  ondansetron Injectable 4 milliGRAM(s) IV Push every 6 hours PRN Nausea  ondansetron Injectable 4 milliGRAM(s) IV Push every 6 hours PRN Nausea      PHYSICAL EXAM:  Constitutional: A&Ox3, NAD  Respiratory: Unlabored breathing  Abdomen: Soft, nondistended, appropriately tender. No rebound or guarding. Dressing C/D/I  Extremities: WWP, RICHTER spontaneously    Labs:                       11.9   11.71 )-----------( 224      ( 07 Aug 2021 03:01 )             34.7   08-07    138  |  101  |  12  ----------------------------<  157<H>  3.7   |  22  |  0.73    Ca    8.8      07 Aug 2021 03:01  Phos  1.9     08-07  Mg     2.00     08-07    TPro  6.1  /  Alb  3.9  /  TBili  2.1<H>  /  DBili  x   /  AST  678<H>  /  ALT  530<H>  /  AlkPhos  56  08-07  ABG - ( 06 Aug 2021 20:14 )  pH, Arterial: 7.35  pH, Blood: x     /  pCO2: 35    /  pO2: 110   / HCO3: 19    / Base Excess: -5.6  /  SaO2: 99.0            PT/INR - ( 07 Aug 2021 03:01 )   PT: 15.5 sec;   INR: 1.37 ratio         PTT - ( 07 Aug 2021 03:01 )  PTT:28.9 sec

## 2021-08-16 ENCOUNTER — TELEPHONE (OUTPATIENT)
Dept: CARDIOLOGY | Facility: CLINIC | Age: 81
End: 2021-08-16

## 2021-08-16 NOTE — TELEPHONE ENCOUNTER
Patient called in requesting results from his CMP done on 08/09.     Patient also requests that the labs be sent to his PCP.     Please adviseLuís

## 2021-08-31 ENCOUNTER — TELEMEDICINE (OUTPATIENT)
Dept: CARDIOLOGY | Facility: CLINIC | Age: 81
End: 2021-08-31

## 2021-08-31 VITALS
HEIGHT: 70 IN | HEART RATE: 59 BPM | WEIGHT: 221 LBS | BODY MASS INDEX: 31.64 KG/M2 | DIASTOLIC BLOOD PRESSURE: 64 MMHG | SYSTOLIC BLOOD PRESSURE: 127 MMHG

## 2021-08-31 DIAGNOSIS — R94.39 ABNORMAL NUCLEAR STRESS TEST: ICD-10-CM

## 2021-08-31 DIAGNOSIS — I20.8 STABLE ANGINA PECTORIS (HCC): ICD-10-CM

## 2021-08-31 DIAGNOSIS — I10 BENIGN ESSENTIAL HTN: Primary | ICD-10-CM

## 2021-08-31 DIAGNOSIS — I25.118 CORONARY ARTERY DISEASE OF NATIVE ARTERY OF NATIVE HEART WITH STABLE ANGINA PECTORIS (HCC): ICD-10-CM

## 2021-08-31 DIAGNOSIS — I35.0 AORTIC STENOSIS, MODERATE: ICD-10-CM

## 2021-08-31 DIAGNOSIS — I48.0 PAROXYSMAL ATRIAL FIBRILLATION (HCC): ICD-10-CM

## 2021-08-31 PROCEDURE — 99213 OFFICE O/P EST LOW 20 MIN: CPT | Performed by: INTERNAL MEDICINE

## 2021-08-31 NOTE — PROGRESS NOTES
Subjective:     Encounter Date:08/31/2021      Patient ID: Ronni Mauricio is a 80 y.o. male.    Chief Complaint:  Chief Complaint   Patient presents with   • Follow-up   Consent obtained for telehealth visit today    Follow-up coronary artery disease, CCS class II angina, cardiac comorbidities hypertension hyperlipidemia    Unable to complete visit using a video connection to the patient. A phone visit was used to complete this visits. Total time of discussion was 12 minutes with additional time and charting 10 minutes, total encounter time 22-minute.  HPI:  History of Present Illness  previously I had the pleasure to see this very pleasant 79-year-old gentleman in follow-up today from hospitalization and catheterization.  He has preserved LV systolic function with significant coronary disease and small vessel disease in his LAD as well as diagonal branch and septal perforators too small for intervention as compared to the size of my 5 Hebrew diagnostic catheter best treated with medications, lipid-lowering therapy antihypertensives and antianginals.  He also had an ostial PDA lesion of 80% with FFR value of 0.96 not meeting hemodynamic significance.  Otherwise he had nonobstructive disease in the proximal RCA proximal LAD and proximal circumflex.  He had preserved LV systolic function with EF of at least 55 to 60%.  He had an abnormal stress test in the apical lateral portion likely owing to small caliber distal LAD that did not reach the apex as well as small caliber diagonal branch.  We have chosen medical therapy and he has had no chest pain since discharge.  His blood pressure today is 152/80 which is been his typical blood pressure and his heart rate is paced at 60.  He has a pacemaker for tachybradycardia syndrome that is in place.  He is on amlodipine benazepril 10/20 daily as well as aspirin 325 daily and rivaroxaban 20 mg daily for stroke risk reduction with paroxysmal atrial fibrillation.  He has not  been on beta-blockade but he has a backup pacemaker and we previously started Coreg 3.125 mg p.o. twice daily however he says he began to have chest pain and discontinue this medication.  However exertional chest pain continued even after discontinuation of this medicine.  He is fine at rest but says with exertion or 1 flight of stairs he gets very short of breath and gets chest heaviness which is progressed to chest pain.  We reviewed his catheterization films again today which shows small vessels peripherally most specifically in the LAD territory.  His blood pressure today is 148/68 and his heart rate is 70 we did discuss additional antianginals such as isosorbide mononitrate 30 daily which we will try.  Additionally will change Coreg to 25 of metoprolol XL daily as more beta-1 selective.  His weight is stable at 216 pounds and he is well compensated with no edema no heart failure     Today via telehealth visit, he does not display any anginal chest pain but has dyspnea on exertion NYHA class II symptoms after significant exertion.  He simply stops place for now and then can resume his activity.  His blood pressure is good today at 120 systolic systolic, he denies any palpitations dizziness diaphoresis.  No recent febrile illnesses.  He appears well compensated but has some mild peripheral edema trace to +1 but says this is chronic and constant.  He has had no resting chest pain or refractory chest pain to a single dose nitroglycerin.  He is otherwise well compensated with no PND orthopnea.  Similar to prior encounters, after discussion on potential revascularization with improvement of his symptoms on medical therapy we will continue to defer LAD revascularization with very small distal LAD size and acute angle bifurcation with diagonal branch which is also diseased.  His EF is underlying normal as documented above.  Left heart catheterization results previously as below.  He is tolerating Ranexa.  He is  tolerating antiplatelets.  He has CCS class II angina, NYHA class II shortness of breath.  He has no change in symptomatology.  He was scheduled to have 2D echo however he would like to defer this as he is very concerned about community coronavirus increase prevalence and he would like to be seen in 6 months.  He says he is doing relatively well compared to prior with no essential changes otherwise given his lack of any progression of symptoms would prefer to defer and stay out of harm's way with respect to Covid as much possible.  This is understandable and he has no significant high risk features.     He remains well compensated, clinically unchanged, will continue to defer invasive evaluation at this time in lieu of goal-directed medical therapy, he is tolerating Plavix and Xarelto          Assessment/ plan   Coronary artery disease  Stable angina  CKD  Preserved LV systolic function  Moderate aortic valve stenosis     Continue on antiplatelet therapy with Plavix today, stop aspirin  Continue Xarelto   Anti anginal therapy with Ranexa 500 mg p.o. b.i.d.  Continue long-acting nitrate  Back to clinic in 3-6 months for follow-up  He will call for clinic for any worsening in frequency severity or duration of chest pain we will proceed with stenting of the LAD with proximal optimization and improvement of downstream flow for anginal chest pain relief  Get 2D echo for structural and functional evaluation, reevaluation of aortic valve stenosis severity given shortness of breath and dyspnea on exertion, no echo in the last 18 to 24 months, per guidelines needs repeat every 1 to 2 years     Review of systems x14 point review of systems is negative except was mentioned above     Historical data copied for from previous encounters and EMR is unchanged      films from his heart catheterization lower reviewed again in person with the patient today with identification of distal RCA stenosis which was FFR negative previously as  well as distal LAD with difficult anatomy conducive to long-term patency is small caliber stents.     Left heart catheterization 7-9-2020  Physicians      Panel Physicians Referring Physician Case Authorizing Physician   Pardeep Melendez MD (Primary)   DeamKory MD   Indications              Dyspnea on exertion [R06.00 (ICD-10-CM)]   Abnormal nuclear stress test [R94.39 (ICD-10-CM)]   Paroxysmal atrial fibrillation (CMS/HCC) [I48.0 (ICD-10-CM)]           Conclusion       Pardeep Melendez MD, PhD  [facility] cardiology  Date of service 7-9-2020     Procedure  1.  Left heart catheterization with coronary angiography left ventriculography via right radial approach  2.  Fractional flow reserve of the distal RCA bifurcation into PDA with intracoronary adenosine for maximal hyperemia and trans-stenosis gradient assessment     After informed consent the patient was brought to the catheterization lab sterilely prepped and draped in usual fashion with exposure the right wrist for right radial access via micropuncture modified Seldinger technique with placement a 5/6 slender sheath to the radial artery under fluoroscopic guidance which was aspirated flushed with heparinized saline.  Standard cocktail was administered with heparin nitroglycerin and verapamil intra-arterially.  An 035 guidewire was advanced to the level aortic valve followed by JL 3 and JR4 diagnostic catheters for selective left and right coronary angiography respectively.  The JR4 was used across the aortic valve followed by exchange for a exchange length J-wire and placement of a [place] pigtail catheter across the aortic valve for transaortic valve pressure gradient assessment given history of moderate aortic stenosis and subjective shortness of air and dyspnea on exertion.  After trans-valvular gradient assessment as well as EDP assessment hand-injection of 8 cc of contrast was used for left-ventricular [place] in FLORES  position.  There was a pullback revealed no significant drift and all catheters and equipment were ultimately removed.  With borderline obstructive CAD at the ostial PDA decision was made for FFR given abnormal stress on this location with diminished counts in the inferior inferolateral wall.  There was MIRNA-3 flow throughout the RCA preprocedurally.  After heparinization with 70 units/kg of heparin for ACT greater than 275 a 6 [place] JR4 guide was used to engage the ostium of the RCA followed by standard FFR with equalization proximally and pressure gradient assessment and fractional flow reserve with intracoronary 130 mcg of adenosine injected followed by FFR measurement.  Lowest FFR value of 0.96 not hemodynamically significant.  The wire was then pulled back showing no significant drift and then the catheter and wire were ultimately removed and the sheath flushed with heparinized saline.  Patient tolerated the procedure well with no complications.  He left the Cath Lab chest pain-free hemodynamically electrically stable alert talking to staff neurologically grossly intact.  TR band was placed in the Cath Lab with immediate hemostasis     Complications none  Blood loss less than 10 cc  Contrast used is less than 120 cc  Moderate conscious sedation time of 1 hour  Conscious sedation was utilized with IV Versed and fentanyl administered by registered nurse with continuous ECG pulse oximeter hematuric monitor throughout the entirety the case supervised by myself     Findings  1.  Opening aortic pressure 152/76  2.  Closing pressure unchanged  3.  LV pressure 140/9 with an EDP of 15 mmHg  4.  Trans-aortic valve gradient 12 to 14 mmHg peak to peak gradient  5.  Preserved LV systolic function EF is 60%     Angiography  1.  Left main is a medium large caliber vessel giving rise to LAD and nondominant circumflex.  There is distal left main 30% narrowing only  2.  The LAD is a medium caliber vessel with proximal and mid  diffuse luminal irregularities nothing greater than 30%.  The distal LAD essentially has dual LAD physiology with bifurcation into a continuation LAD which does not reach the apex and gives off septal perforators as well as immediate takeoff of a bifurcating diagonal branch that courses to the apical lateral and anterolateral wall.  There is no obstructive disease of this bifurcating distal diagonal branch.  The continuation LAD is small in size likely 2 mm or less with proximal 70% disease but again very small caliber portion of the vessel likely best treated medically given small caliber and MIRNA-3 flow.  The first diagonal branch has ostial 30% narrowing only and only diffuse luminal regularities after and then bifurcates distally with no obstructive disease after the proximal portion.  MIRNA-3 flow throughout the LAD distribution  3.  The circumflex is a medium caliber nondominant vessel with continuation circumflex, and small obtuse marginal branches with limited course along the lateral wall.  There is no obstructive disease in the circumflex distribution greater than 30%.  4.  The RCA is a large-caliber dominant vessel with ostial 30% narrowing.  The proximal mid and distal portion have diffuse luminal irregularities nothing greater than 30%.  The distal RCA at the bifurcation narrows to approximately 60% followed by takeoff of a medium caliber large PLV branch which trifurcates distally and perfuses the inferolateral wall but has MIRNA-3 flow.  The ostium of this PLV branch is widely patent with less than 30% narrowing.  The ostium of the PDA branch has 70 perhaps 80% stenosis and no obstructive disease thereafter.  FFR of the distal RCA into PDA branch representing the tightest stenosis was hemodynamically insignificant at 0.96 best treated medically per guidelines     Insignificant FFR value 0.96 of the distal RCA into RPDA branch.     Conclusions and recommendations     Abnormal stress location was in the  inferior and inferolateral wall however FFR is insignificant and there is no obstructive disease from the RCA into PLV branch.  Abnormal stress location of the apical lateral portion likely owing to small caliber distal LAD which is again best treated medically given small size and low likelihood of long-term patency with small caliber stent in this location as well as possible complicated placement noting trifurcation of this portion of the LAD..  Preserved LV systolic function  Slightly high LVEDP at 15 mmHg  No significant transaortic valve gradient, mild to moderate hemodynamic aortic stenosis maximally by [place] pigtail assessment of simultaneous transvalvular gradients.  Medical management with aspirin and statin, beta-blocker as tolerated with pacemaker in place for tachybradycardia syndrome with atrial sensed ventricular paced rhythm presently with history of paroxysmal atrial fibrillation  Diet and exercise per AHA guidelines  Restart anticoagulation tomorrow is okay  Restrictions discussed with respect to lifting with right arm status post radial access  Follow-up in clinic in 2 to 4 weeks with cardiology     It is a pleasure to be involved in his cardiovascular care.  Please call with any questions or concerns  Pardeep Melendez MD, PhD           The following portions of the patient's history were reviewed and updated as appropriate: allergies, current medications, past family history, past medical history, past social history, past surgical history and problem list.    Problem List:  Patient Active Problem List   Diagnosis   • Benign essential HTN   • Sick sinus syndrome (CMS/HCC)   • Presence of cardiac pacemaker   • Heart murmur   • Aortic stenosis, moderate   • Diastolic dysfunction   • Dyspnea on exertion   • Abnormal nuclear stress test   • Paroxysmal atrial fibrillation (CMS/HCC)   • Coronary artery disease of native artery of native heart with stable angina pectoris (CMS/HCC)   • Stable angina  pectoris (CMS/HCC)   • Complete heart block (CMS/HCC)       Past Medical History:  Past Medical History:   Diagnosis Date   • Atrial fibrillation (CMS/HCC)    • Benign essential HTN    • Pacemaker    • Sick sinus syndrome (CMS/HCC)        Past Surgical History:  Past Surgical History:   Procedure Laterality Date   • CARDIAC CATHETERIZATION N/A 7/9/2020    Procedure: Left Heart Cath;  Surgeon: Pardeep Melendez MD;  Location: [facility] CATH INVASIVE LOCATION;  Service: Cardiovascular;  Laterality: N/A;   • CARDIAC CATHETERIZATION N/A 7/9/2020    Procedure: Coronary angiography;  Surgeon: Pardeep Melendez MD;  [facility] C[facility]ASIVE LOC[facility];  Service: Cardiovascular;  Laterality: N/A;   • CARDIAC CATHETERIZATION N/A 7/9/2020    Procedure: Left ventriculography;  Surgeon: Pardeep Melendez MD;  [facility] C[facility];  Service: Cardiovascular;  Laterality: N/A;   • CARDIAC CATHETERIZATION  7/9/2020    Procedure: Functional Flow [place];  Surgeon: Pardeep Melendez MD;  Location: [facility] CATH INVASIVE LOCATION;  Service: Cardiovascular;;   • CARDIAC ELECTROPHYSIOLOGY PROCEDURE N/A 5/22/2020    Procedure: PPM battery change. [facility] change out;  Surgeon: Kory Corrales MD;  Location: [facility] CATH INVASIVE LOCATION;  Service: Cardiology;  Laterality: N/A;   • INSERT / REPLACE / REMOVE PACEMAKER      [facility]   • PACEMAKER REPLACEMENT  05/22/2020    [facility]    • SINUS SURGERY         Social History:  Social History     Socioeconomic History   • Marital status:      Spouse name: Not on file   • Number of children: Not on file   • Years of education: Not on file   • Highest education level: Not on file   Tobacco Use   • Smoking status: Never Smoker   • Smokeless tobacco: Former User     Types: Chew   Substance and Sexual Activity   • Alcohol use: Yes     Comment: occasional   • Drug use: No   • Sexual activity: Defer       Allergies:  Allergies   Allergen  "Reactions   • Codeine Itching   • Morphine Itching       Immunizations:    There is no immunization history on file for this patient.    ROS:  ROS       Objective:         /64   Pulse 59   Ht 177.8 cm (70\")   Wt 100 kg (221 lb)   BMI 31.71 kg/m²     Physical Exam  Unable to obtain  In-Office Procedure(s):  Procedures    ASCVD RIsk Score::  The ASCVD Risk score ([place] AVINA Jr., et al., 2013) failed to calculate for the following reasons:    The 2013 ASCVD risk score is only valid for ages 40 to 79    Recent Radiology:  Imaging Results (Most Recent)     None          Lab Review:               Assessment:         No diagnosis found.       Plan:            Level of Care:                 Pardeep Melendez MD  08/31/21  .  "

## 2021-09-08 ENCOUNTER — APPOINTMENT (OUTPATIENT)
Dept: CARDIOLOGY | Facility: HOSPITAL | Age: 81
End: 2021-09-08

## 2021-09-13 RX ORDER — CLOPIDOGREL BISULFATE 75 MG/1
TABLET ORAL
Qty: 90 TABLET | Refills: 1 | Status: SHIPPED | OUTPATIENT
Start: 2021-09-13 | End: 2022-01-01

## 2021-09-13 RX ORDER — CARVEDILOL 3.12 MG/1
TABLET ORAL
Qty: 180 TABLET | Refills: 1 | Status: SHIPPED | OUTPATIENT
Start: 2021-09-13 | End: 2022-01-01

## 2021-09-21 RX ORDER — RANOLAZINE 500 MG/1
TABLET, EXTENDED RELEASE ORAL
Qty: 180 TABLET | Refills: 0 | Status: SHIPPED | OUTPATIENT
Start: 2021-09-21 | End: 2021-12-14

## 2021-09-21 RX ORDER — ISOSORBIDE MONONITRATE 30 MG/1
TABLET, EXTENDED RELEASE ORAL
Qty: 90 TABLET | Refills: 0 | Status: SHIPPED | OUTPATIENT
Start: 2021-09-21 | End: 2022-01-01

## 2021-11-01 RX ORDER — ATORVASTATIN CALCIUM 20 MG/1
TABLET, FILM COATED ORAL
Qty: 90 TABLET | Refills: 1 | Status: SHIPPED | OUTPATIENT
Start: 2021-11-01 | End: 2022-01-01

## 2021-12-09 ENCOUNTER — OFFICE VISIT (OUTPATIENT)
Dept: CARDIOLOGY | Facility: CLINIC | Age: 81
End: 2021-12-09

## 2021-12-09 VITALS
HEIGHT: 70 IN | DIASTOLIC BLOOD PRESSURE: 80 MMHG | OXYGEN SATURATION: 96 % | RESPIRATION RATE: 18 BRPM | BODY MASS INDEX: 31.78 KG/M2 | SYSTOLIC BLOOD PRESSURE: 118 MMHG | HEART RATE: 60 BPM | WEIGHT: 222 LBS

## 2021-12-09 DIAGNOSIS — I25.118 CORONARY ARTERY DISEASE OF NATIVE ARTERY OF NATIVE HEART WITH STABLE ANGINA PECTORIS (HCC): ICD-10-CM

## 2021-12-09 DIAGNOSIS — I10 BENIGN ESSENTIAL HTN: ICD-10-CM

## 2021-12-09 DIAGNOSIS — I48.0 PAROXYSMAL ATRIAL FIBRILLATION (HCC): ICD-10-CM

## 2021-12-09 DIAGNOSIS — I44.2 COMPLETE HEART BLOCK (HCC): ICD-10-CM

## 2021-12-09 DIAGNOSIS — I49.5 SICK SINUS SYNDROME (HCC): Primary | ICD-10-CM

## 2021-12-09 DIAGNOSIS — Z95.0 PRESENCE OF CARDIAC PACEMAKER: ICD-10-CM

## 2021-12-09 PROCEDURE — 99213 OFFICE O/P EST LOW 20 MIN: CPT | Performed by: INTERNAL MEDICINE

## 2021-12-09 PROCEDURE — 93288 INTERROG EVL PM/LDLS PM IP: CPT | Performed by: INTERNAL MEDICINE

## 2021-12-10 NOTE — PROGRESS NOTES
Subjective:     Encounter Date:12/09/2021      Patient ID: Ronni Mauricio is a 81 y.o. male.    Chief Complaint:  Chief Complaint   Patient presents with   • Follow-up     Heart Block   • Atrial Fibrillation       HPI:  Mr Mauricio is an 80 yo patient of Dr. Melendez with a past medical history significant for HTN and AV block who had a pacemaker implanted in 2009.  He also was noted to have some paroxysmal atrial fibrillation on remote monitoring and has been on Xarelto.     He had an echo 11/19 which showed an EF of 45-50%, concentric LVH with grade 1 diastolic dysfunction, normal RV size and function, moderate AS without AR, MAC wtihout MS/MR.     Since he was last seen a year ago, he has been doing well without symptoms of palpitations, lightheadedness or syncope.         The following portions of the patient's history were reviewed and updated as appropriate: allergies, current medications, past family history, past medical history, past social history, past surgical history and problem list.    Problem List:  Patient Active Problem List   Diagnosis   • Benign essential HTN   • Sick sinus syndrome (HCC)   • Presence of cardiac pacemaker   • Heart murmur   • Aortic stenosis, moderate   • Diastolic dysfunction   • Dyspnea on exertion   • Abnormal nuclear stress test   • Paroxysmal atrial fibrillation (HCC)   • Coronary artery disease of native artery of native heart with stable angina pectoris (HCC)   • Stable angina pectoris (HCC)   • Complete heart block (HCC)       Active Med List:    Current Outpatient Medications:   •  amLODIPine-benazepril (Lotrel) 5-20 MG per capsule, Take 1 capsule by mouth Daily., Disp: 90 capsule, Rfl: 3  •  atorvastatin (LIPITOR) 20 MG tablet, TAKE 1 TABLET EVERY DAY, Disp: 90 tablet, Rfl: 1  •  carvedilol (COREG) 3.125 MG tablet, TAKE 1 TABLET BY MOUTH EVERY 12 HOURS., Disp: 180 tablet, Rfl: 1  •  clopidogrel (PLAVIX) 75 MG tablet, TAKE 1 TABLET EVERY DAY, Disp: 90 tablet, Rfl: 1  •   furosemide (LASIX) 20 MG tablet, Take 1 tablet by mouth Daily., Disp: 90 tablet, Rfl: 1  •  isosorbide mononitrate (IMDUR) 30 MG 24 hr tablet, TAKE 1 TABLET EVERY DAY, Disp: 90 tablet, Rfl: 0  •  meloxicam (MOBIC) 15 MG tablet, Take 15 mg by mouth Daily., Disp: , Rfl:   •  ranolazine (RANEXA) 500 MG 12 hr tablet, TAKE 1 TABLET TWICE DAILY, Disp: 180 tablet, Rfl: 0  •  Xarelto 20 MG tablet, TAKE 1 TABLET EVERY DAY, Disp: 90 tablet, Rfl: 3     Past Medical History:  Past Medical History:   Diagnosis Date   • Atrial fibrillation (HCC)    • Benign essential HTN    • Pacemaker    • Sick sinus syndrome (HCC)        Past Surgical History:  Past Surgical History:   Procedure Laterality Date   • CARDIAC CATHETERIZATION N/A 7/9/2020    Procedure: Left Heart Cath;  Surgeon: Pardeep Melendez MD;  Location: [facility] CATH INVASIVE LOCATION;  Service: Cardiovascular;  Laterality: N/A;   • CARDIAC CATHETERIZATION N/A 7/9/2020    Procedure: Coronary angiography;  Surgeon: Pardeep Melendez MD;  Location: [facility] CATH INVASIVE LOCATION;  Service: Cardiovascular;  Laterality: N/A;   • CARDIAC CATHETERIZATION N/A 7/9/2020    Procedure: Left ventriculography;  Surgeon: Pardeep Melendez MD;  Location: [facility] CATH INVASIVE LOCATION;  Service: Cardiovascular;  Laterality: N/A;   • CARDIAC CATHETERIZATION  7/9/2020    Procedure: Functional Flow [place];  Surgeon: Pardeep Melendez MD;  Location: [facility] CATH INVASIVE LOCATION;  Service: Cardiovascular;;   • CARDIAC ELECTROPHYSIOLOGY PROCEDURE N/A 5/22/2020    Procedure: PPM battery change. [facility] change out;  Surgeon: Kory Corrales MD;  Location: [facility] CATH INVASIVE LOCATION;  Service: Cardiology;  Laterality: N/A;   • INSERT / REPLACE / REMOVE PACEMAKER      [facility]   • PACEMAKER REPLACEMENT  05/22/2020    [facility]    • SINUS SURGERY         Social History:  Social History     Socioeconomic History   • Marital status:    Tobacco Use   •  "Smoking status: Never Smoker   • Smokeless tobacco: Former User     Types: Chew   Substance and Sexual Activity   • Alcohol use: Yes     Comment: occasional   • Drug use: No   • Sexual activity: Defer       Allergies:  Allergies   Allergen Reactions   • Codeine Itching   • Morphine Itching       Immunizations:    There is no immunization history on file for this patient.       Objective:         Review of Systems   Constitutional: Negative for fatigue.   Respiratory: Negative for shortness of breath.    Cardiovascular: Negative for chest pain, palpitations and leg swelling.   All other systems reviewed and are negative.       /80   Pulse 60   Resp 18   Ht 177.8 cm (70\")   Wt 101 kg (222 lb)   SpO2 96%   BMI 31.85 kg/m²     Constitutional:       General: Not in acute distress.     Appearance: Well-developed.   Eyes:      General: No scleral icterus.     Conjunctiva/sclera: Conjunctivae normal.      Pupils: Pupils are equal, round, and reactive to light.   HENT:      Head: Normocephalic and atraumatic.   Neck:      Thyroid: No thyromegaly.   Pulmonary:      Effort: Pulmonary effort is normal.      Breath sounds: Normal breath sounds.   Cardiovascular:      Normal rate. Regular rhythm.   Abdominal:      General: Bowel sounds are normal.      Palpations: Abdomen is soft.   Musculoskeletal: Normal range of motion.      Cervical back: Normal range of motion. Skin:     General: Skin is warm and dry.   Neurological:      Mental Status: Alert and oriented to person, place, and time.         In-Office Procedure(s):  Procedures  No orders to display   Pacemaker eval interpreted by [facility] [id number]  Battery 10 yrs to corrina    P wave 3mv  Threshold 0.5V  Impedance 360 ohms    R wave paced  Threshold 0.8V  Impedance 610 ohms    Events - <1% AF burden    ASCVD RIsk Score::  The ASCVD Risk score (Ronal FABRICE Jr., et al., 2013) failed to calculate for the following reasons:    The 2013 ASCVD risk score is only valid for ages 40 to " 79    Recent Radiology:          Lab Review:       Recent labs reviewed and interpreted for clinical significance and application          Assessment:          Diagnosis Plan   1. Sick sinus syndrome (HCC)     2. Presence of cardiac pacemaker     3. Benign essential HTN     4. Paroxysmal atrial fibrillation (HCC)     5. Coronary artery disease of native artery of native heart with stable angina pectoris (HCC)     6. Complete heart block (HCC)            Plan:      1. Complete heart block - s/p pacemaker    2. Paroxysmal AF - asymptomatic, on Xarelto and metoprolol    3. Pacemaker followup - normal device function     Continue remote monitoring  RTC 1 year      Level of Care:                 Kory Corrales MD  12/10/21

## 2021-12-14 RX ORDER — RANOLAZINE 500 MG/1
TABLET, EXTENDED RELEASE ORAL
Qty: 180 TABLET | Refills: 0 | Status: SHIPPED | OUTPATIENT
Start: 2021-12-14 | End: 2022-01-01

## 2022-01-01 ENCOUNTER — TELEPHONE (OUTPATIENT)
Dept: CARDIOLOGY | Facility: CLINIC | Age: 82
End: 2022-01-01

## 2022-01-01 ENCOUNTER — OFFICE VISIT (OUTPATIENT)
Dept: CARDIOLOGY | Facility: CLINIC | Age: 82
End: 2022-01-01

## 2022-01-01 VITALS
WEIGHT: 219 LBS | BODY MASS INDEX: 30.66 KG/M2 | HEART RATE: 78 BPM | RESPIRATION RATE: 18 BRPM | HEIGHT: 71 IN | SYSTOLIC BLOOD PRESSURE: 162 MMHG | DIASTOLIC BLOOD PRESSURE: 86 MMHG | OXYGEN SATURATION: 94 %

## 2022-01-01 VITALS
BODY MASS INDEX: 31.08 KG/M2 | OXYGEN SATURATION: 96 % | WEIGHT: 222 LBS | HEART RATE: 60 BPM | SYSTOLIC BLOOD PRESSURE: 130 MMHG | HEIGHT: 71 IN | DIASTOLIC BLOOD PRESSURE: 86 MMHG

## 2022-01-01 DIAGNOSIS — I25.118 CORONARY ARTERY DISEASE OF NATIVE ARTERY OF NATIVE HEART WITH STABLE ANGINA PECTORIS: ICD-10-CM

## 2022-01-01 DIAGNOSIS — I35.0 AORTIC STENOSIS, MODERATE: ICD-10-CM

## 2022-01-01 DIAGNOSIS — I20.8 STABLE ANGINA PECTORIS: ICD-10-CM

## 2022-01-01 DIAGNOSIS — I48.0 AF (PAROXYSMAL ATRIAL FIBRILLATION): ICD-10-CM

## 2022-01-01 DIAGNOSIS — Z95.0 PRESENCE OF CARDIAC PACEMAKER: ICD-10-CM

## 2022-01-01 DIAGNOSIS — I10 BENIGN ESSENTIAL HTN: ICD-10-CM

## 2022-01-01 DIAGNOSIS — Z09 FOLLOW-UP EXAM, 7 MONTHS TO 1 YEAR SINCE PREVIOUS EXAM: Primary | ICD-10-CM

## 2022-01-01 DIAGNOSIS — R31.9 HEMATURIA, UNSPECIFIED TYPE: Primary | ICD-10-CM

## 2022-01-01 DIAGNOSIS — I48.0 PAROXYSMAL ATRIAL FIBRILLATION: Primary | ICD-10-CM

## 2022-01-01 DIAGNOSIS — I49.5 SICK SINUS SYNDROME: ICD-10-CM

## 2022-01-01 DIAGNOSIS — I48.0 PAROXYSMAL ATRIAL FIBRILLATION: ICD-10-CM

## 2022-01-01 DIAGNOSIS — R94.39 ABNORMAL NUCLEAR STRESS TEST: ICD-10-CM

## 2022-01-01 DIAGNOSIS — I44.2 COMPLETE HEART BLOCK: ICD-10-CM

## 2022-01-01 PROCEDURE — 93296 REM INTERROG EVL PM/IDS: CPT | Performed by: INTERNAL MEDICINE

## 2022-01-01 PROCEDURE — 99213 OFFICE O/P EST LOW 20 MIN: CPT | Performed by: NURSE PRACTITIONER

## 2022-01-01 PROCEDURE — 99214 OFFICE O/P EST MOD 30 MIN: CPT | Performed by: INTERNAL MEDICINE

## 2022-01-01 PROCEDURE — 93294 REM INTERROG EVL PM/LDLS PM: CPT | Performed by: INTERNAL MEDICINE

## 2022-01-01 RX ORDER — AMLODIPINE BESYLATE AND BENAZEPRIL HYDROCHLORIDE 5; 20 MG/1; MG/1
CAPSULE ORAL
Qty: 90 CAPSULE | Refills: 1 | Status: SHIPPED | OUTPATIENT
Start: 2022-01-01

## 2022-01-01 RX ORDER — CARVEDILOL 3.12 MG/1
TABLET ORAL
Qty: 180 TABLET | Refills: 1 | Status: SHIPPED | OUTPATIENT
Start: 2022-01-01

## 2022-01-01 RX ORDER — CLOPIDOGREL BISULFATE 75 MG/1
TABLET ORAL
Qty: 90 TABLET | Refills: 1 | Status: SHIPPED | OUTPATIENT
Start: 2022-01-01 | End: 2022-01-01

## 2022-01-01 RX ORDER — ATORVASTATIN CALCIUM 20 MG/1
TABLET, FILM COATED ORAL
Qty: 90 TABLET | Refills: 1 | Status: SHIPPED | OUTPATIENT
Start: 2022-01-01

## 2022-01-01 RX ORDER — RIVAROXABAN 20 MG/1
TABLET, FILM COATED ORAL
Qty: 90 TABLET | Refills: 3 | Status: SHIPPED | OUTPATIENT
Start: 2022-01-01 | End: 2022-01-01

## 2022-01-01 RX ORDER — CARVEDILOL 3.12 MG/1
TABLET ORAL
Qty: 180 TABLET | Refills: 1 | Status: SHIPPED | OUTPATIENT
Start: 2022-01-01 | End: 2022-01-01

## 2022-01-01 RX ORDER — FUROSEMIDE 20 MG/1
20 TABLET ORAL DAILY
Qty: 90 TABLET | Refills: 1 | Status: SHIPPED | OUTPATIENT
Start: 2022-01-01

## 2022-01-01 RX ORDER — CLOPIDOGREL BISULFATE 75 MG/1
TABLET ORAL
Qty: 90 TABLET | Refills: 1 | Status: SHIPPED | OUTPATIENT
Start: 2022-01-01

## 2022-01-01 RX ORDER — ISOSORBIDE MONONITRATE 30 MG/1
TABLET, EXTENDED RELEASE ORAL
Qty: 90 TABLET | Refills: 0 | Status: SHIPPED | OUTPATIENT
Start: 2022-01-01 | End: 2022-01-01

## 2022-01-01 RX ORDER — ISOSORBIDE MONONITRATE 30 MG/1
TABLET, EXTENDED RELEASE ORAL
Qty: 90 TABLET | Refills: 1 | Status: SHIPPED | OUTPATIENT
Start: 2022-01-01

## 2022-01-01 RX ORDER — RANOLAZINE 500 MG/1
TABLET, EXTENDED RELEASE ORAL
Qty: 180 TABLET | Refills: 0 | Status: SHIPPED | OUTPATIENT
Start: 2022-01-01 | End: 2022-01-01

## 2022-01-01 RX ORDER — FUROSEMIDE 20 MG/1
TABLET ORAL
Qty: 90 TABLET | Refills: 1 | Status: SHIPPED | OUTPATIENT
Start: 2022-01-01 | End: 2022-01-01 | Stop reason: SDUPTHER

## 2022-01-01 RX ORDER — RANOLAZINE 500 MG/1
TABLET, EXTENDED RELEASE ORAL
Qty: 180 TABLET | Refills: 1 | Status: SHIPPED | OUTPATIENT
Start: 2022-01-01

## 2022-01-12 NOTE — PROGRESS NOTES
Cardiology Clinic Note  Pardeep Melendez MD, PhD    Subjective:     Encounter Date:01/12/2022      Patient ID: Ronni Mauricio is a 81 y.o. male.    Chief Complaint:  Chief Complaint   Patient presents with   • Follow-up   • Hypertension   • Heart Murmur       HPI:  previously I had the pleasure to see this very pleasant 79-year-old gentleman in follow-up today from hospitalization and catheterization.  He has preserved LV systolic function with significant coronary disease and small vessel disease in his LAD as well as diagonal branch and septal perforators too small for intervention as compared to the size of my 5 [place] diagnostic catheter best treated with medications, lipid-lowering therapy antihypertensives and antianginals.  He also had an ostial PDA lesion of 80% with FFR value of 0.96 not meeting hemodynamic significance.  Otherwise he had nonobstructive disease in the proximal RCA proximal LAD and proximal circumflex.  He had preserved LV systolic function with EF of at least 55 to 60%.  He had an abnormal stress test in the apical lateral portion likely owing to small caliber distal LAD that did not reach the apex as well as small caliber diagonal branch.  We have chosen medical therapy and he has had no chest pain since discharge.  His blood pressure today is 152/80 which is been his typical blood pressure and his heart rate is paced at 60.  He has a pacemaker for tachybradycardia syndrome that is in place.  He is on amlodipine benazepril 10/20 daily as well as aspirin 325 daily and rivaroxaban 20 mg daily for stroke risk reduction with paroxysmal atrial fibrillation.  He has not been on beta-blockade but he has a backup pacemaker and we previously started Coreg 3.125 mg p.o. twice daily however he says he began to have chest pain and discontinue this medication.  However exertional chest pain continued even after discontinuation of this medicine.  He is fine at rest but says with exertion or 1 flight of  stairs he gets very short of breath and gets chest heaviness which is progressed to chest pain.  We reviewed his catheterization films again today which shows small vessels peripherally most specifically in the LAD territory.  His blood pressure today is 148/68 and his heart rate is 70 we did discuss additional antianginals such as isosorbide mononitrate 30 daily which we will try.  Additionally will change Coreg to 25 of metoprolol XL daily as more beta-1 selective.  His weight is stable at 216 pounds and he is well compensated with no edema no heart failure     Today  be in person visit, he does not display any anginal chest pain but has dyspnea on exertion NYHA class II symptoms after significant exertion.  He simply stops place for now and then can resume his activity.  His blood pressure is good today at 120-130 systolic systolic, he denies any palpitations dizziness diaphoresis.  No recent febrile illnesses.  He appears well compensated but has some mild peripheral edema trace to +1 but says this is chronic and constant.  He has had no resting chest pain or refractory chest pain to a single dose nitroglycerin.  He is otherwise well compensated with no PND orthopnea.  Similar to prior encounters, after discussion on potential revascularization with improvement of his symptoms on medical therapy we will continue to defer LAD revascularization with very small distal LAD size and acute angle bifurcation with diagonal branch which is also diseased.  His EF is underlying normal as documented above.  Left heart catheterization results previously as below.  He is tolerating Ranexa.  He is tolerating antiplatelets.  He has CCS class II angina, NYHA class II shortness of breath.  He has no change in symptomatology.  He was scheduled to have 2D echo however he would like to defer this as he is very concerned about community coronavirus increase prevalence and he would like to be seen in 6 months.  He says he is doing  relatively well compared to prior with no essential changes otherwise given his lack of any progression of symptoms.  No other acute concerns today  He remains well compensated, clinically unchanged, will continue to defer invasive evaluation at this time in lieu of goal-directed medical therapy, he is tolerating Plavix and Xarelto           Assessment/ plan   Coronary artery disease  Stable angina  CKD  Preserved LV systolic function  Moderate aortic valve stenosis     Continue on antiplatelet therapy with Plavix today, stop aspirin  Continue Xarelto   Anti anginal therapy with Ranexa 500 mg p.o. b.i.d.  Continue long-acting nitrate  Back to clinic in 3-6 months for follow-up  He will call for clinic for any worsening in frequency severity or duration of chest pain we will proceed with stenting of the LAD with proximal optimization and improvement of downstream flow for anginal chest pain relief  Repeat 2D echo for structural and functional evaluation was not performed secondary to COVID concerns, will reorder for reevaluation of aortic valve stenosis severity given shortness of breath and dyspnea on exertion becomes more prevalent for, no echo in the last 18 to 24 months, per guidelines needs repeat every 1 to 2 years, will readdress in 6 months with unchanged symptomatology    Continue present treatment plan    Review of systems x14 point review of systems is negative except was mentioned above     Historical data copied for from previous encounters and EMR is unchanged      films from his heart catheterization lower reviewed again in person with the patient today with identification of distal RCA stenosis which was FFR negative previously as well as distal LAD with difficult anatomy conducive to long-term patency is small caliber stents.     Left heart catheterization 7-9-2020  Physicians      Panel Physicians Referring Physician Case Authorizing Physician   Pardeep Melendez MD (Primary)   Kory Corrales  MD Bogdan   Indications              Dyspnea on exertion [R06.00 (ICD-10-CM)]   Abnormal nuclear stress test [R94.39 (ICD-10-CM)]   Paroxysmal atrial fibrillation (CMS/HCC) [I48.0 (ICD-10-CM)]           Conclusion       Pardeep Melendez MD, PhD  [facility] cardiology  Date of service 7-9-2020     Procedure  1.  Left heart catheterization with coronary angiography left ventriculography via right radial approach  2.  Fractional flow reserve of the distal RCA bifurcation into PDA with intracoronary adenosine for maximal hyperemia and trans-stenosis gradient assessment     After informed consent the patient was brought to the catheterization lab sterilely prepped and draped in usual fashion with exposure the right wrist for right radial access via micropuncture modified Seldinger technique with placement a 5/6 slender sheath to the radial artery under fluoroscopic guidance which was aspirated flushed with heparinized saline.  Standard cocktail was administered with heparin nitroglycerin and verapamil intra-arterially.  An 035 guidewire was advanced to the level aortic valve followed by JL 3 and JR4 diagnostic catheters for selective left and right coronary angiography respectively.  The JR4 was used across the aortic valve followed by exchange for a exchange length J-wire and placement of a [place] pigtail catheter across the aortic valve for transaortic valve pressure gradient assessment given history of moderate aortic stenosis and subjective shortness of air and dyspnea on exertion.  After trans-valvular gradient assessment as well as EDP assessment hand-injection of 8 cc of contrast was used for left-ventricular Griffey in FLORES position.  There was a pullback revealed no significant drift and all catheters and equipment were ultimately removed.  With borderline obstructive CAD at the ostial PDA decision was made for FFR given abnormal stress on this location with diminished counts in the inferior  inferolateral wall.  There was MIRNA-3 flow throughout the RCA preprocedurally.  After heparinization with 70 units/kg of heparin for ACT greater than 275 a 6 [place]er JR4 guide was used to engage the ostium of the RCA followed by standard FFR with equalization proximally and pressure gradient assessment and fractional flow reserve with intracoronary 130 mcg of adenosine injected followed by FFR measurement.  Lowest FFR value of 0.96 not hemodynamically significant.  The wire was then pulled back showing no significant drift and then the catheter and wire were ultimately removed and the sheath flushed with heparinized saline.  Patient tolerated the procedure well with no complications.  He left the Cath Lab chest pain-free hemodynamically electrically stable alert talking to staff neurologically grossly intact.  TR band was placed in the Cath Lab with immediate hemostasis     Complications none  Blood loss less than 10 cc  Contrast used is less than 120 cc  Moderate conscious sedation time of 1 hour  Conscious sedation was utilized with IV Versed and fentanyl administered by registered nurse with continuous ECG pulse oximeter hematuric monitor throughout the entirety the case supervised by myself     Findings  1.  Opening aortic pressure 152/76  2.  Closing pressure unchanged  3.  LV pressure 140/9 with an EDP of 15 mmHg  4.  Trans-aortic valve gradient 12 to 14 mmHg peak to peak gradient  5.  Preserved LV systolic function EF is 60%     Angiography  1.  Left main is a medium large caliber vessel giving rise to LAD and nondominant circumflex.  There is distal left main 30% narrowing only  2.  The LAD is a medium caliber vessel with proximal and mid diffuse luminal irregularities nothing greater than 30%.  The distal LAD essentially has dual LAD physiology with bifurcation into a continuation LAD which does not reach the apex and gives off septal perforators as well as immediate takeoff of a bifurcating diagonal branch  that courses to the apical lateral and anterolateral wall.  There is no obstructive disease of this bifurcating distal diagonal branch.  The continuation LAD is small in size likely 2 mm or less with proximal 70% disease but again very small caliber portion of the vessel likely best treated medically given small caliber and MIRNA-3 flow.  The first diagonal branch has ostial 30% narrowing only and only diffuse luminal regularities after and then bifurcates distally with no obstructive disease after the proximal portion.  MIRNA-3 flow throughout the LAD distribution  3.  The circumflex is a medium caliber nondominant vessel with continuation circumflex, and small obtuse marginal branches with limited course along the lateral wall.  There is no obstructive disease in the circumflex distribution greater than 30%.  4.  The RCA is a large-caliber dominant vessel with ostial 30% narrowing.  The proximal mid and distal portion have diffuse luminal irregularities nothing greater than 30%.  The distal RCA at the bifurcation narrows to approximately 60% followed by takeoff of a medium caliber large PLV branch which trifurcates distally and perfuses the inferolateral wall but has MIRNA-3 flow.  The ostium of this PLV branch is widely patent with less than 30% narrowing.  The ostium of the PDA branch has 70 perhaps 80% stenosis and no obstructive disease thereafter.  FFR of the distal RCA into PDA branch representing the tightest stenosis was hemodynamically insignificant at 0.96 best treated medically per guidelines     Insignificant FFR value 0.96 of the distal RCA into RPDA branch.     Conclusions and recommendations     Abnormal stress location was in the inferior and inferolateral wall however FFR is insignificant and there is no obstructive disease from the RCA into PLV branch.  Abnormal stress location of the apical lateral portion likely owing to small caliber distal LAD which is again best treated medically given small  "size and low likelihood of long-term patency with small caliber stent in this location as well as possible complicated placement noting trifurcation of this portion of the LAD..  Preserved LV systolic function  Slightly high LVEDP at 15 mmHg  No significant transaortic valve gradient, mild to moderate hemodynamic aortic stenosis maximally by Ryan pigtail assessment of simultaneous transvalvular gradients.  Medical management with aspirin and statin, beta-blocker as tolerated with pacemaker in place for tachybradycardia syndrome with atrial sensed ventricular paced rhythm presently with history of paroxysmal atrial fibrillation  Diet and exercise per AHA guidelines  Restart anticoagulation tomorrow is okay  Restrictions discussed with respect to lifting with right arm status post radial access  Follow-up in clinic in 2 to 4 weeks with cardiology     It is a pleasure to be involved in his cardiovascular care.  Please call with any questions or concerns  Pardeep Melendez MD, PhD              Historical data copied forward from previous encounters in EMR including the history, exam, and assessment/plan has been reviewed and is unchanged unless noted otherwise.    Cardiac medicines reviewed with risk, benefits, and necessity of each discussed.    Risk and benefit of cardiac testing reviewed including death heart attack stroke pain bleeding infection need for vascular /cardiovascular surgery were discussed and the patient     Objective:         /86   Pulse 60   Ht 180.3 cm (71\")   Wt 101 kg (222 lb)   SpO2 96%   BMI 30.96 kg/m²     Physical Exam  Regular rate and rhythm, 2 out of 6 systolic ejection murmur sternal border consistent with aortic stenosis, preserved S2  No clubbing cyanosis, trace edema  Soft nontender nondistended  Clear to auscultation  Radial pulses 2+ equal bilaterally  No carotid bruits or JVD          The pleasure to be involved in this patient's cardiovascular care.  Please call with any " questions or concerns  Pardeep Melendez MD, PhD    Most recent EKG as reviewed and interpreted by me:  Procedures     Most recent echo as reviewed and interpreted by me:  Results for orders placed during the hospital encounter of 06/22/20    Adult Transthoracic Echo Complete W/ Cont if Necessary Per Protocol    Interpretation Summary  · Left ventricular wall thickness is consistent with mild concentric hypertrophy.  · The following left ventricular wall segments are akinetic: apical anterior.  · Left ventricular systolic function is mildly decreased.  · Left ventricular diastolic dysfunction (grade I) consistent with impaired relaxation.  · Moderate aortic valve stenosis is present.  · Mild mitral valve regurgitation is present  · Mild tricuspid valve regurgitation is present.      Most recent stress test as reviewed and interpreted by me:  Results for orders placed during the hospital encounter of 06/22/20    Stress Test With Myocardial Perfusion    Interpretation Summary  · Findings consistent with an indeterminate ECG stress test.  · Diaphragmatic attenuation artifact is present.  · Rotating images demonstrate diaphragmatic attenuation. There is mild left ventricular dilatation.  · The tomographic stress images demonstrate diminished counts noted in the inferior, inferoapical and lateral walls.  · The rest images demonstrate minimal improvement in the lateral wall of the myocardium. This does not represent a significant amount of myocardium.  · The study is suggestive of prior inferior lateral apical wall infarction with minimal edith-infarction ischemia.  · The sum stress score is 13 and the SDS is 0, indicating no significant reversibility  · Wall motion demonstrates inferoapical wall hypokinesis.  · The overall ejection fraction is preserved at 63%.      Most recent cardiac catheterization as reviewed interpreted by me:  Results for orders placed during the hospital encounter of 07/09/20    Cardiac  Catheterization/Vascular Study    Narrative   Pardeep Melendez MD, PhD  [facility] cardiology  Date of service 7-9-2020    Procedure  1.  Left heart catheterization with coronary angiography left ventriculography via right radial approach  2.  Fractional flow reserve of the distal RCA bifurcation into PDA with intracoronary adenosine for maximal hyperemia and trans-stenosis gradient assessment    After informed consent the patient was brought to the catheterization lab sterilely prepped and draped in usual fashion with exposure the right wrist for right radial access via micropuncture modified Seldinger technique with placement a 5/6 slender sheath to the radial artery under fluoroscopic guidance which was aspirated flushed with heparinized saline.  Standard cocktail was administered with heparin nitroglycerin and verapamil intra-arterially.  An 035 guidewire was advanced to the level aortic valve followed by JL 3 and JR4 diagnostic catheters for selective left and right coronary angiography respectively.  The JR4 was used across the aortic valve followed by exchange for a exchange length J-wire and placement of a Ryan pigtail catheter across the aortic valve for transaortic valve pressure gradient assessment given history of moderate aortic stenosis and subjective shortness of air and dyspnea on exertion.  After trans-valvular gradient assessment as well as EDP assessment hand-injection of 8 cc of contrast was used for left-ventricular Griffey in FLORES position.  There was a pullback revealed no significant drift and all catheters and equipment were ultimately removed.  With borderline obstructive CAD at the ostial PDA decision was made for FFR given abnormal stress on this location with diminished counts in the inferior inferolateral wall.  There was MIRNA-3 flow throughout the RCA preprocedurally.  After heparinization with 70 units/kg of heparin for ACT greater than 275 a 6 Romanian JR4 guide  was used to engage the ostium of the RCA followed by standard FFR with equalization proximally and pressure gradient assessment and fractional flow reserve with intracoronary 130 mcg of adenosine injected followed by FFR measurement.  Lowest FFR value of 0.96 not hemodynamically significant.  The wire was then pulled back showing no significant drift and then the catheter and wire were ultimately removed and the sheath flushed with heparinized saline.  Patient tolerated the procedure well with no complications.  He left the Cath Lab chest pain-free hemodynamically electrically stable alert talking to staff neurologically grossly intact.  TR band was placed in the Cath Lab with immediate hemostasis    Complications none  Blood loss less than 10 cc  Contrast used is less than 120 cc  Moderate conscious sedation time of 1 hour  Conscious sedation was utilized with IV Versed and fentanyl administered by registered nurse with continuous ECG pulse oximeter hematuric monitor throughout the entirety the case supervised by myself    Findings  1.  Opening aortic pressure 152/76  2.  Closing pressure unchanged  3.  LV pressure 140/9 with an EDP of 15 mmHg  4.  Trans-aortic valve gradient 12 to 14 mmHg peak to peak gradient  5.  Preserved LV systolic function EF is 60%    Angiography  1.  Left main is a medium large caliber vessel giving rise to LAD and nondominant circumflex.  There is distal left main 30% narrowing only  2.  The LAD is a medium caliber vessel with proximal and mid diffuse luminal irregularities nothing greater than 30%.  The distal LAD essentially has dual LAD physiology with bifurcation into a continuation LAD which does not reach the apex and gives off septal perforators as well as immediate takeoff of a bifurcating diagonal branch that courses to the apical lateral and anterolateral wall.  There is no obstructive disease of this bifurcating distal diagonal branch.  The continuation LAD is small in size  likely 2 mm or less with proximal 70% disease but again very small caliber portion of the vessel likely best treated medically given small caliber and MIRNA-3 flow.  The first diagonal branch has ostial 30% narrowing only and only diffuse luminal regularities after and then bifurcates distally with no obstructive disease after the proximal portion.  MIRNA-3 flow throughout the LAD distribution  3.  The circumflex is a medium caliber nondominant vessel with continuation circumflex, and small obtuse marginal branches with limited course along the lateral wall.  There is no obstructive disease in the circumflex distribution greater than 30%.  4.  The RCA is a large-caliber dominant vessel with ostial 30% narrowing.  The proximal mid and distal portion have diffuse luminal irregularities nothing greater than 30%.  The distal RCA at the bifurcation narrows to approximately 60% followed by takeoff of a medium caliber large PLV branch which trifurcates distally and perfuses the inferolateral wall but has MIRNA-3 flow.  The ostium of this PLV branch is widely patent with less than 30% narrowing.  The ostium of the PDA branch has 70 perhaps 80% stenosis and no obstructive disease thereafter.  FFR of the distal RCA into PDA branch representing the tightest stenosis was hemodynamically insignificant at 0.96 best treated medically per guidelines    Insignificant FFR value 0.96 of the distal RCA into RPDA branch.    Conclusions and recommendations    Abnormal stress location was in the inferior and inferolateral wall however FFR is insignificant and there is no obstructive disease from the RCA into PLV branch.  Abnormal stress location of the apical lateral portion likely owing to small caliber distal LAD which is again best treated medically given small size and low likelihood of long-term patency with small caliber stent in this location as well as possible complicated placement noting trifurcation of this portion of the  LAD..  Preserved LV systolic function  Slightly high LVEDP at 15 mmHg  No significant transaortic valve gradient, mild to moderate hemodynamic aortic stenosis maximally by Ryan pigtail assessment of simultaneous transvalvular gradients.  Medical management with aspirin and statin, beta-blocker as tolerated with pacemaker in place for tachybradycardia syndrome with atrial sensed ventricular paced rhythm presently with history of paroxysmal atrial fibrillation  Diet and exercise per AHA guidelines  Restart anticoagulation tomorrow is okay  Restrictions discussed with respect to lifting with right arm status post radial access  Follow-up in clinic in 2 to 4 weeks with cardiology    It is a pleasure to be involved in his cardiovascular care.  Please call with any questions or concerns  Pardeep Melendez MD, PhD    The following portions of the patient's history were reviewed and updated as appropriate: allergies, current medications, past family history, past medical history, past social history, past surgical history and problem list.      ROS:  14 point review of systems negative except as mentioned above    Current Outpatient Medications:   •  amLODIPine-benazepril (Lotrel) 5-20 MG per capsule, Take 1 capsule by mouth Daily., Disp: 90 capsule, Rfl: 3  •  atorvastatin (LIPITOR) 20 MG tablet, TAKE 1 TABLET EVERY DAY, Disp: 90 tablet, Rfl: 1  •  carvedilol (COREG) 3.125 MG tablet, TAKE 1 TABLET BY MOUTH EVERY 12 HOURS., Disp: 180 tablet, Rfl: 1  •  clopidogrel (PLAVIX) 75 MG tablet, TAKE 1 TABLET EVERY DAY, Disp: 90 tablet, Rfl: 1  •  furosemide (LASIX) 20 MG tablet, Take 1 tablet by mouth Daily., Disp: 90 tablet, Rfl: 1  •  isosorbide mononitrate (IMDUR) 30 MG 24 hr tablet, TAKE 1 TABLET EVERY DAY, Disp: 90 tablet, Rfl: 0  •  meloxicam (MOBIC) 15 MG tablet, Take 15 mg by mouth Daily., Disp: , Rfl:   •  ranolazine (RANEXA) 500 MG 12 hr tablet, TAKE 1 TABLET TWICE DAILY, Disp: 180 tablet, Rfl: 0  •  Xarelto 20 MG  tablet, TAKE 1 TABLET EVERY DAY, Disp: 90 tablet, Rfl: 3    Problem List:  Patient Active Problem List   Diagnosis   • Benign essential HTN   • Sick sinus syndrome (HCC)   • Presence of cardiac pacemaker   • Heart murmur   • Aortic stenosis, moderate   • Diastolic dysfunction   • Dyspnea on exertion   • Abnormal nuclear stress test   • Paroxysmal atrial fibrillation (HCC)   • Coronary artery disease of native artery of native heart with stable angina pectoris (HCC)   • Stable angina pectoris (HCC)   • Complete heart block (HCC)     Past Medical History:  Past Medical History:   Diagnosis Date   • Atrial fibrillation (HCC)    • Benign essential HTN    • Coronary artery disease    • Heart murmur    • Pacemaker    • Sick sinus syndrome (HCC)      Past Surgical History:  Past Surgical History:   Procedure Laterality Date   • CARDIAC CATHETERIZATION N/A 7/9/2020    Procedure: Left Heart Cath;  Surgeon: Pardeep Melendez MD;  Location: [facility] CATH INVASIVE LOCATION;  Service: Cardiovascular;  Laterality: N/A;   • CARDIAC CATHETERIZATION N/A 7/9/2020    Procedure: Coronary angiography;  Surgeon: Pardeep Melendez MD;  Location: [facility] CATH INVASIVE LOCATION;  Service: Cardiovascular;  Laterality: N/A;   • CARDIAC CATHETERIZATION N/A 7/9/2020    Procedure: Left ventriculography;  Surgeon: Pardeep Melendez MD;  Location: [facility] CATH INVASIVE LOCATION;  Service: Cardiovascular;  Laterality: N/A;   • CARDIAC CATHETERIZATION  7/9/2020    Procedure: Functional Flow [place];  Surgeon: Pardeep Melendez MD;  Location: [facility] CATH INVASIVE LOCATION;  Service: Cardiovascular;;   • CARDIAC ELECTROPHYSIOLOGY PROCEDURE N/A 5/22/2020    Procedure: PPM battery change. [facility] change out;  Surgeon: Kory Corrales MD;  Location: [facility] CATH INVASIVE LOCATION;  Service: Cardiology;  Laterality: N/A;   • INSERT / REPLACE / REMOVE PACEMAKER      [facility]   • PACEMAKER REPLACEMENT  05/22/2020    [facility]    • SINUS SURGERY       Social History:  Social History     Socioeconomic History   • Marital status:    Tobacco Use   • Smoking status: Never Smoker   • Smokeless tobacco: Former User     Types: Chew   Vaping Use   • Vaping Use: Never used   Substance and Sexual Activity   • Alcohol use: Yes     Comment: occasional   • Drug use: No   • Sexual activity: Defer     Allergies:  Allergies   Allergen Reactions   • Codeine Itching   • Morphine Itching     Immunizations:    There is no immunization history on file for this patient.         In-Office Procedure(s):  No orders to display        ASCVD RIsk Score::  The ASCVD Risk score (Ronalbetsy AVINA Jr., et al., 2013) failed to calculate for the following reasons:    The 2013 ASCVD risk score is only valid for ages 40 to 79    Imaging:                 Lab Review:   Results Encounter on 08/05/2021   Component Date Value   • Glucose 08/09/2021 CANCELED    • BUN 08/09/2021 20    • Creatinine 08/09/2021 1.51*   • eGFR Non African Am 08/09/2021 42*   • eGFR  Am 08/09/2021 49*   • BUN/Creatinine Ratio 08/09/2021 13    • Sodium 08/09/2021 139    • Potassium 08/09/2021 CANCELED    • Chloride 08/09/2021 101    • Total CO2 08/09/2021 24    • Calcium 08/09/2021 8.9    • Total Protein 08/09/2021 6.7    • Albumin 08/09/2021 4.1    • Globulin 08/09/2021 2.6    • A/G Ratio 08/09/2021 1.6    • Total Bilirubin 08/09/2021 0.4    • Alkaline Phosphatase 08/09/2021 78    • AST (SGOT) 08/09/2021 12    • ALT (SGPT) 08/09/2021 8      Recent labs reviewed and interpreted for clinical significance and application            Level of Care:           Pardeep Melendez MD  01/12/22  .

## 2022-05-02 NOTE — TELEPHONE ENCOUNTER
Pt notified that we have not received a transmission since 4-9-22. He should contact the company to see why it is beeping.

## 2022-05-02 NOTE — TELEPHONE ENCOUNTER
Deam pt    Patient would like to know if we have received any transmissions? States his home monitor keeps beeping.    CB# [phone]

## 2022-05-09 NOTE — TELEPHONE ENCOUNTER
Replacement home monitor sent to our office. Called patient and notified that it was here and he would need to come pick it up. Patient stated he would be here on Tuesday 05/10 to .

## 2022-08-08 NOTE — TELEPHONE ENCOUNTER
DELETE AFTER REVIEWING: Telephone encounter to be sent to the clinical pool     Caller: MAX     Relationship: SELF     Best call back number: [phone]    What medications are you currently taking:   Current Outpatient Medications on File Prior to Visit   Medication Sig Dispense Refill   • amLODIPine-benazepril (LOTREL 5-20) 5-20 MG per capsule TAKE 1 CAPSULE EVERY DAY 90 capsule 1   • atorvastatin (LIPITOR) 20 MG tablet TAKE 1 TABLET EVERY DAY 90 tablet 1   • carvedilol (COREG) 3.125 MG tablet TAKE 1 TABLET EVERY 12 HOURS 180 tablet 1   • clopidogrel (PLAVIX) 75 MG tablet TAKE 1 TABLET EVERY DAY 90 tablet 1   • furosemide (LASIX) 20 MG tablet Take 1 tablet by mouth Daily. 90 tablet 1   • isosorbide mononitrate (IMDUR) 30 MG 24 hr tablet TAKE 1 TABLET EVERY DAY 90 tablet 1   • meloxicam (MOBIC) 15 MG tablet Take 15 mg by mouth Daily.     • ranolazine (RANEXA) 500 MG 12 hr tablet TAKE 1 TABLET TWICE DAILY 180 tablet 1   • Xarelto 20 MG tablet TAKE 1 TABLET EVERY DAY 90 tablet 3     No current facility-administered medications on file prior to visit.          Which medication are you concerned about: XARELTO 20MG     Who prescribed you this medication:      What are your concerns: THE PRICE HAS WENT UP ON MEDICATION TO ABOUT $330     PT WOULD LIKE TO KNOW I THERE IS A DIFFERENT MEDICATION THAT IS MORE AFFORDABLE. PT HAS 11 DAY SUPPLY LEFT OF MEDICATION. PLEASE GIVE HIM A CALL ASAP.

## 2022-08-09 NOTE — TELEPHONE ENCOUNTER
Per Opal, pt called back today;    Pt> states pthat he spoke with Britt yesterday about changing his blood thinner but he now says he is going to stay with the Xarelto because it is the cheapest.

## 2022-08-09 NOTE — TELEPHONE ENCOUNTER
22-Dr Corrales  Pt: Ronni Mauricio  : 1940  Phone: [phone]  Reason for Call:  Pt> states pthat he spoke with Britt yesterday about changing his blood thinner but he now says he is going to stay with the Xarelto because it is the cheapest.

## 2022-09-01 NOTE — PROGRESS NOTES
Cardiology Office Follow Up Visit      Primary Care Provider:  Rakesh Vieira MD    Reason for f/u:     6 month follow up, h/o CAD, SSS s/p PPM, pAfib      Subjective     CC:    Routine 6 month follow up    History of Present Illness       Ronni Mauricio is a 81 y.o. male who is a patient of Dr. Melendez and Dr. Corrales.  Pmh includes CAD s/p LHC in 2020 which showed (see below), sick sinus syndrome with AV block s/p PPM placement in 2009, paroxysmal atrial fibrillation on Xarelto for anticoagulation, VHD with moderate AS, LVEF 50% on ECHO 2020.    Mr. Mauricio presents today for routine follow up. He reports he is doing well. He has no issues and no complaints of chest pain, dyspnea on exertion or activity change. His device is followed by Dr. Corrales. It was interrogated today and has normal function. He is tolerating blood thinners.       ASSESSMENT/PLAN:      Diagnoses and all orders for this visit:    1. Follow-up exam, 7 months to 1 year since previous exam (Primary)    2. Paroxysmal atrial fibrillation (HCC)  Comments:  on Xarelto    3. Complete heart block (HCC)    4. Sick sinus syndrome (HCC)    5. Presence of cardiac pacemaker    6. 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  Otherwise he had nonobstructive disease in the proximal RCA proximal LAD and proximal circumflex.       7. Aortic stenosis, moderate    MEDICAL DECISION MAKING:  Mr. Mauricio presents today for routine follow up. He denies chest pain or shortness of breath. He has no exertional symptoms. We discussed his prior echocardiogram from 2020 which showed LVEF 50% and moderate AS. He declines  additional echo at this time as he has no sytmpoms.  His device is managed and montiored by Dr. Corrales. Normal function. He had Atrial flutter episode in April 2022 but has overall AT/AF burden < 1%. He should cotninue beta blockers.     He will continue current medication regimen He should follow up with Dr. Corrales at next scheduled appointment. He has follow up with Dr. Melendez in 2/2023.        Past Medical History:   Diagnosis Date   • Atrial fibrillation (HCC)    • Benign essential HTN    • Coronary artery disease    • Heart murmur    • Pacemaker    • Sick sinus syndrome (HCC)        Past Surgical History:   Procedure Laterality Date   • CARDIAC CATHETERIZATION N/A 7/9/2020    Procedure: Left Heart Cath;  Surgeon: Pardeep Melendez MD;  Location: [facility] CATH INVASIVE LOCATION;  Service: Cardiovascular;  Laterality: N/A;   • CARDIAC CATHETERIZATION N/A 7/9/2020    Procedure: Coronary angiography;  Surgeon: Pardeep Melendez MD;  Location: [facility] CATH INVASIVE LOCATION;  Service: Cardiovascular;  Laterality: N/A;   • CARDIAC CATHETERIZATION N/A 7/9/2020    Procedure: Left ventriculography;  Surgeon: Pardeep Melendez MD;  Location: [facility] CATH INVASIVE LOCATION;  Service: Cardiovascular;  Laterality: N/A;   • CARDIAC CATHETERIZATION  7/9/2020    Procedure: Functional Flow [place];  Surgeon: Pardeep Melendez MD;  Location: [facility] CATH INVASIVE LOCATION;  Service: Cardiovascular;;   • CARDIAC ELECTROPHYSIOLOGY PROCEDURE N/A 5/22/2020    Procedure: PPM battery change. [facility] change out;  Surgeon: Kory Corrales MD;  Location: [facility] CATH INVASIVE LOCATION;  Service: Cardiology;  Laterality: N/A;   • INSERT / REPLACE / REMOVE PACEMAKER      [facility]   • PACEMAKER REPLACEMENT  05/22/2020    [facility]    • SINUS SURGERY           Current Outpatient Medications:   •  amLODIPine-benazepril (LOTREL 5-20) 5-20 MG per capsule, TAKE 1 CAPSULE EVERY DAY, Disp: 90 capsule, Rfl: 1  •   atorvastatin (LIPITOR) 20 MG tablet, TAKE 1 TABLET EVERY DAY, Disp: 90 tablet, Rfl: 1  •  furosemide (LASIX) 20 MG tablet, Take 1 tablet by mouth Daily., Disp: 90 tablet, Rfl: 1  •  isosorbide mononitrate (IMDUR) 30 MG 24 hr tablet, TAKE 1 TABLET EVERY DAY, Disp: 90 tablet, Rfl: 1  •  meloxicam (MOBIC) 15 MG tablet, Take 15 mg by mouth Daily., Disp: , Rfl:   •  ranolazine (RANEXA) 500 MG 12 hr tablet, TAKE 1 TABLET TWICE DAILY, Disp: 180 tablet, Rfl: 1  •  rivaroxaban (Xarelto) 20 MG tablet, Take 1 tablet by mouth Daily., Disp: 30 tablet, Rfl: 11  •  carvedilol (COREG) 3.125 MG tablet, TAKE 1 TABLET EVERY 12 HOURS, Disp: 180 tablet, Rfl: 1  •  clopidogrel (PLAVIX) 75 MG tablet, TAKE 1 TABLET EVERY DAY, Disp: 90 tablet, Rfl: 1    Social History     Socioeconomic History   • Marital status:    Tobacco Use   • Smoking status: Never Smoker   • Smokeless tobacco: Former User     Types: Chew   Vaping Use   • Vaping Use: Never used   Substance and Sexual Activity   • Alcohol use: Yes     Comment: occasional   • Drug use: No   • Sexual activity: Defer       Family History   Problem Relation Age of Onset   • Heart failure Mother    • Heart failure Sister    • Heart failure Brother        The following portions of the patient's history were reviewed and updated as appropriate: allergies, current medications, past family history, past medical history, past social history, past surgical history and problem list.    Review of Systems   Constitutional: Negative for chills, diaphoresis and malaise/fatigue.   Cardiovascular: Negative for chest pain, dyspnea on exertion, irregular heartbeat, leg swelling, near-syncope, orthopnea, palpitations, paroxysmal nocturnal dyspnea and syncope.   Respiratory: Negative for cough, shortness of breath, sleep disturbances due to breathing and sputum production.    Gastrointestinal: Negative for change in bowel habit.   Genitourinary: Negative for urgency.   Neurological: Negative for  "dizziness and headaches.   Psychiatric/Behavioral: Negative for altered mental status.     /86 (BP Location: Left arm, Patient Position: Sitting)   Pulse 78   Resp 18   Ht 180.3 cm (70.98\")   Wt 99.3 kg (219 lb)   SpO2 94%   BMI 30.56 kg/m² .  Objective     Constitutional:       Appearance: Not in distress.   Neck:      Vascular: JVD normal.   Pulmonary:      Effort: Pulmonary effort is normal.      Breath sounds: Normal breath sounds.   Cardiovascular:      Normal rate. Regular rhythm.      Murmurs: There is a harsh midsystolic murmur at the URSB, radiating to the neck.   Pulses:     Intact distal pulses.   Edema:     Peripheral edema absent.   Abdominal:      General: Bowel sounds are normal.      Palpations: Abdomen is soft.   Musculoskeletal: Normal range of motion. Skin:     General: Skin is warm and dry.   Neurological:      General: No focal deficit present.      Mental Status: Oriented to person, place and time.           Procedures    EKG ordered by and reviewed by me in office                      "

## 2022-09-01 NOTE — ASSESSMENT & PLAN NOTE
[facility] 2020: significant coronary disease and small vessel disease in his LAD as well as diagonal branch and septal perforators too small for intervention; ostial PDA lesion of 80% with FFR value of 0.96 not meeting hemodynamic significance.  Otherwise he had nonobstructive disease in the proximal RCA proximal LAD and proximal circumflex.

## 2022-10-10 NOTE — TELEPHONE ENCOUNTER
Patient called and stated he would like to have some lab work done patient stated that he has blood in his urine and would like to have the lab work ordered so Dr Melendez can see what is going on. Patient stated that he takes xarelto 20 mg and plavix 75 mg

## 2022-10-12 NOTE — TELEPHONE ENCOUNTER
PT CALLED TO GET THE RESULTS FOR THE LABS. WOULD LIKE THEM FAXED TO HIS PRIMARY CARE DOCTOR AS WELL .[phone]  DR MALIK HESS

## 2022-10-13 NOTE — TELEPHONE ENCOUNTER
Calling for lab results done a few days ago  [facility] [place] They should have faxed them to you

## 2023-04-25 NOTE — TELEPHONE ENCOUNTER
Pt looking for alternative to Xarelto. It is too expensive.    Implemented All Universal Safety Interventions:  [place] to call system. Call bell, personal items and telephone within reach. Instruct patient to call for assistance. Room bathroom lighting operational. Non-slip footwear when patient is off stretcher. Physically safe environment: no spills, clutter or unnecessary equipment. Stretcher in lowest position, wheels locked, appropriate side rails in place.

## (undated) DEVICE — PK COLD NECK NORCO HD CONTR 23IN REUS

## (undated) DEVICE — CATH DIAG EXPO .045 FL3  5F 100CM

## (undated) DEVICE — PK CATH CARD 40

## (undated) DEVICE — Device

## (undated) DEVICE — TR BAND RADIAL ARTERY COMPRESSION DEVICE: Brand: TR BAND

## (undated) DEVICE — DRSNG SURESITE WNDW 4X4.5

## (undated) DEVICE — SOL IRR NACL 0.9PCT BT 1000ML

## (undated) DEVICE — LIMB HOLDER, WRIST/ANKLE: Brand: DEROYAL

## (undated) DEVICE — KT MANIFLD CARDIAC

## (undated) DEVICE — GLIDESHEATH SLENDER STAINLESS STEEL KIT: Brand: GLIDESHEATH SLENDER

## (undated) DEVICE — CATH DIAG IMPULSE FR4 6F 100CM

## (undated) DEVICE — 3M™ STERI-STRIP™ ANTIMICROBIAL SKIN CLOSURES 1/2 INCH X 4 INCHES 50/CARTON 4 CARTONS/CASE A1847: Brand: 3M™ STERI-STRIP™

## (undated) DEVICE — 6F .070 JR 4 100CM: Brand: CORDIS

## (undated) DEVICE — LOU PACE DEFIB: Brand: MEDLINE INDUSTRIES, INC.

## (undated) DEVICE — GW EMR FIX EXCHG J STD .035 3MM 260CM

## (undated) DEVICE — GW PRESSUREWIRE AERIS W/ AGILE TP 175CM

## (undated) DEVICE — PLASMABLADE PS200-040 4.0: Brand: PLASMABLADE™

## (undated) DEVICE — ELECTRD ECG CARBON/SNP RL FM A/ 5PK

## (undated) DEVICE — EACH LANGSTON DUAL LUMEN CATHETER IS INDICATED FOR DELIVERY OF CONTRAST MEDIUM IN ANGIOGRAPHIC STUDIES AND FOR SIMULTANEOUS PRESSURE MEASUREMENT FROM TWO SITES. THIS TYPE OF PRESSURE MEASUREMENT IS USEFUL IN DETERMINING TRANSVALVULAR, INTRAVASCULAR AND INTRAVENTRICULAR PRESSURE GRADIENTS.: Brand: LANGSTON® DUAL LUMEN CATHETER

## (undated) DEVICE — AIRLIFE™ NASAL OXYGEN CANNULA CURVED, NONFLARED TIP WITH 21 FOOT (6.4 M) CRUSH-RESISTANT TUBING, OVER-THE-EAR STYLE: Brand: AIRLIFE™